# Patient Record
Sex: MALE | Race: BLACK OR AFRICAN AMERICAN | Employment: OTHER | URBAN - METROPOLITAN AREA
[De-identification: names, ages, dates, MRNs, and addresses within clinical notes are randomized per-mention and may not be internally consistent; named-entity substitution may affect disease eponyms.]

---

## 2017-08-11 ENCOUNTER — HOSPITAL ENCOUNTER (EMERGENCY)
Age: 52
Discharge: HOME | End: 2017-08-11
Attending: EMERGENCY MEDICINE
Payer: COMMERCIAL

## 2017-08-11 ENCOUNTER — APPOINTMENT (OUTPATIENT)
Dept: ULTRASOUND IMAGING | Age: 52
End: 2017-08-11
Attending: PHYSICIAN ASSISTANT
Payer: COMMERCIAL

## 2017-08-11 VITALS
BODY MASS INDEX: 41.14 KG/M2 | SYSTOLIC BLOOD PRESSURE: 152 MMHG | TEMPERATURE: 97.5 F | WEIGHT: 262.13 LBS | OXYGEN SATURATION: 91 % | DIASTOLIC BLOOD PRESSURE: 84 MMHG | HEART RATE: 78 BPM | HEIGHT: 67 IN | RESPIRATION RATE: 16 BRPM

## 2017-08-11 DIAGNOSIS — R10.9 RIGHT FLANK PAIN: Primary | ICD-10-CM

## 2017-08-11 DIAGNOSIS — R31.9 HEMATURIA: ICD-10-CM

## 2017-08-11 LAB
ALBUMIN SERPL BCP-MCNC: 3.4 G/DL (ref 3.5–5)
ALBUMIN/GLOB SERPL: 0.8 {RATIO} (ref 1.1–2.2)
ALP SERPL-CCNC: 84 U/L (ref 45–117)
ALT SERPL-CCNC: 36 U/L (ref 12–78)
ANION GAP BLD CALC-SCNC: 6 MMOL/L (ref 5–15)
APPEARANCE UR: CLEAR
AST SERPL W P-5'-P-CCNC: 16 U/L (ref 15–37)
BACTERIA URNS QL MICRO: NEGATIVE /HPF
BASOPHILS # BLD AUTO: 0 K/UL (ref 0–0.1)
BASOPHILS # BLD: 1 % (ref 0–1)
BILIRUB SERPL-MCNC: 0.4 MG/DL (ref 0.2–1)
BILIRUB UR QL: NEGATIVE
BUN SERPL-MCNC: 10 MG/DL (ref 6–20)
BUN/CREAT SERPL: 9 (ref 12–20)
CALCIUM SERPL-MCNC: 8.5 MG/DL (ref 8.5–10.1)
CHLORIDE SERPL-SCNC: 100 MMOL/L (ref 97–108)
CO2 SERPL-SCNC: 29 MMOL/L (ref 21–32)
COLOR UR: ABNORMAL
CREAT SERPL-MCNC: 1.14 MG/DL (ref 0.7–1.3)
EOSINOPHIL # BLD: 0.1 K/UL (ref 0–0.4)
EOSINOPHIL NFR BLD: 1 % (ref 0–7)
EPITH CASTS URNS QL MICRO: ABNORMAL /LPF
ERYTHROCYTE [DISTWIDTH] IN BLOOD BY AUTOMATED COUNT: 12.6 % (ref 11.5–14.5)
GLOBULIN SER CALC-MCNC: 4.2 G/DL (ref 2–4)
GLUCOSE BLD STRIP.AUTO-MCNC: 284 MG/DL (ref 65–100)
GLUCOSE BLD STRIP.AUTO-MCNC: 378 MG/DL (ref 65–100)
GLUCOSE SERPL-MCNC: 309 MG/DL (ref 65–100)
GLUCOSE UR STRIP.AUTO-MCNC: >1000 MG/DL
HCT VFR BLD AUTO: 40.8 % (ref 36.6–50.3)
HGB BLD-MCNC: 14.3 G/DL (ref 12.1–17)
HGB UR QL STRIP: ABNORMAL
KETONES UR QL STRIP.AUTO: NEGATIVE MG/DL
LEUKOCYTE ESTERASE UR QL STRIP.AUTO: NEGATIVE
LIPASE SERPL-CCNC: 94 U/L (ref 73–393)
LYMPHOCYTES # BLD AUTO: 23 % (ref 12–49)
LYMPHOCYTES # BLD: 2.1 K/UL (ref 0.8–3.5)
MCH RBC QN AUTO: 31.2 PG (ref 26–34)
MCHC RBC AUTO-ENTMCNC: 35 G/DL (ref 30–36.5)
MCV RBC AUTO: 89.1 FL (ref 80–99)
MONOCYTES # BLD: 0.3 K/UL (ref 0–1)
MONOCYTES NFR BLD AUTO: 3 % (ref 5–13)
NEUTS SEG # BLD: 6.3 K/UL (ref 1.8–8)
NEUTS SEG NFR BLD AUTO: 72 % (ref 32–75)
NITRITE UR QL STRIP.AUTO: NEGATIVE
PH UR STRIP: 5.5 [PH] (ref 5–8)
PLATELET # BLD AUTO: 159 K/UL (ref 150–400)
POTASSIUM SERPL-SCNC: 3.8 MMOL/L (ref 3.5–5.1)
PROT SERPL-MCNC: 7.6 G/DL (ref 6.4–8.2)
PROT UR STRIP-MCNC: NEGATIVE MG/DL
RBC # BLD AUTO: 4.58 M/UL (ref 4.1–5.7)
RBC #/AREA URNS HPF: ABNORMAL /HPF (ref 0–5)
SERVICE CMNT-IMP: ABNORMAL
SERVICE CMNT-IMP: ABNORMAL
SODIUM SERPL-SCNC: 135 MMOL/L (ref 136–145)
SP GR UR REFRACTOMETRY: 1.02 (ref 1–1.03)
UA: UC IF INDICATED,UAUC: ABNORMAL
UROBILINOGEN UR QL STRIP.AUTO: 0.2 EU/DL (ref 0.2–1)
WBC # BLD AUTO: 8.8 K/UL (ref 4.1–11.1)
WBC URNS QL MICRO: ABNORMAL /HPF (ref 0–4)

## 2017-08-11 PROCEDURE — 36415 COLL VENOUS BLD VENIPUNCTURE: CPT

## 2017-08-11 PROCEDURE — 81001 URINALYSIS AUTO W/SCOPE: CPT

## 2017-08-11 PROCEDURE — 76705 ECHO EXAM OF ABDOMEN: CPT

## 2017-08-11 PROCEDURE — 74011250636 HC RX REV CODE- 250/636: Performed by: PHYSICIAN ASSISTANT

## 2017-08-11 PROCEDURE — 82962 GLUCOSE BLOOD TEST: CPT

## 2017-08-11 PROCEDURE — 99285 EMERGENCY DEPT VISIT HI MDM: CPT

## 2017-08-11 PROCEDURE — 85025 COMPLETE CBC W/AUTO DIFF WBC: CPT

## 2017-08-11 PROCEDURE — 96361 HYDRATE IV INFUSION ADD-ON: CPT

## 2017-08-11 PROCEDURE — 96374 THER/PROPH/DIAG INJ IV PUSH: CPT

## 2017-08-11 PROCEDURE — 96375 TX/PRO/DX INJ NEW DRUG ADDON: CPT

## 2017-08-11 PROCEDURE — 80053 COMPREHEN METABOLIC PANEL: CPT | Performed by: EMERGENCY MEDICINE

## 2017-08-11 PROCEDURE — 83690 ASSAY OF LIPASE: CPT

## 2017-08-11 RX ORDER — METOCLOPRAMIDE HYDROCHLORIDE 5 MG/ML
10 INJECTION INTRAMUSCULAR; INTRAVENOUS
Status: COMPLETED | OUTPATIENT
Start: 2017-08-11 | End: 2017-08-11

## 2017-08-11 RX ORDER — IBUPROFEN 800 MG/1
800 TABLET ORAL
Qty: 30 TAB | Refills: 0 | Status: SHIPPED | OUTPATIENT
Start: 2017-08-11 | End: 2017-09-26 | Stop reason: ALTCHOICE

## 2017-08-11 RX ORDER — OXYCODONE AND ACETAMINOPHEN 5; 325 MG/1; MG/1
1 TABLET ORAL
Qty: 10 TAB | Refills: 0 | Status: SHIPPED | OUTPATIENT
Start: 2017-08-11 | End: 2017-08-14

## 2017-08-11 RX ORDER — ONDANSETRON 4 MG/1
4 TABLET, ORALLY DISINTEGRATING ORAL
Qty: 10 TAB | Refills: 0 | Status: SHIPPED | OUTPATIENT
Start: 2017-08-11 | End: 2017-09-26 | Stop reason: ALTCHOICE

## 2017-08-11 RX ORDER — TAMSULOSIN HYDROCHLORIDE 0.4 MG/1
0.4 CAPSULE ORAL DAILY
Qty: 7 CAP | Refills: 0 | Status: SHIPPED | OUTPATIENT
Start: 2017-08-11 | End: 2017-08-18

## 2017-08-11 RX ORDER — MORPHINE SULFATE 2 MG/ML
4 INJECTION, SOLUTION INTRAMUSCULAR; INTRAVENOUS ONCE
Status: COMPLETED | OUTPATIENT
Start: 2017-08-11 | End: 2017-08-11

## 2017-08-11 RX ORDER — ONDANSETRON 2 MG/ML
4 INJECTION INTRAMUSCULAR; INTRAVENOUS ONCE
Status: COMPLETED | OUTPATIENT
Start: 2017-08-11 | End: 2017-08-11

## 2017-08-11 RX ADMIN — ONDANSETRON 4 MG: 2 INJECTION INTRAMUSCULAR; INTRAVENOUS at 12:10

## 2017-08-11 RX ADMIN — METOCLOPRAMIDE 10 MG: 5 INJECTION, SOLUTION INTRAMUSCULAR; INTRAVENOUS at 12:10

## 2017-08-11 RX ADMIN — SODIUM CHLORIDE 1000 ML: 900 INJECTION, SOLUTION INTRAVENOUS at 12:11

## 2017-08-11 RX ADMIN — MORPHINE SULFATE 4 MG: 2 INJECTION, SOLUTION INTRAMUSCULAR; INTRAVENOUS at 12:13

## 2017-08-11 NOTE — ED NOTES
Called to have lab add-on the lipase. Original labs hemolyzed, so second sample sent the lipase was not processed. Will process the lipase now.

## 2017-08-11 NOTE — DISCHARGE INSTRUCTIONS
Thank you for allowing us to provide you with excellent care today. We hope we addressed all of your concerns and needs. We strive to provide excellent quality care in the Emergency Department. Please rate us as excellent, as anything less than excellent does not meet our expectations. If you feel that you have not received excellent quality care or timely care, please ask to speak to the nurse manager. Please choose us in the future for your continued health care needs. The exam and treatment you received in the Emergency Department were for an urgent problem and are not intended as complete care. It is important that you follow-up with a doctor, nurse practitioner, or physician assistant to:  (1) confirm your diagnosis,  (2) re-evaluation of changes in your illness and treatment, and  (3) for ongoing care. If your symptoms become worse or you do not improve as expected and you are unable to reach your usual health care provider, you should return to the Emergency Department. We are available 24 hours a day. Take this sheet with you when you go to your follow-up visit. If you have any problem arranging the follow-up visit, contact [address] [place] [address] 21 [phone])    Make an appointment with your Primary Care doctor for follow up of this visit. Return to the ER if you are unable to be seen in the time recommended on your discharge instructions.

## 2017-08-11 NOTE — ED NOTES
Pt presents to ED for for R. Flank pain that radiates to abdomen x 0900 am. Pt reports nausea and dry heaving with the pain. Pt denies diarrhea, or urinary symptoms. Normal bowel movement this morning. Pt alert and oriented x 4. Pt has diabetes and has not seen a MD for that recently due to a lapse in insurance.

## 2017-08-11 NOTE — ED PROVIDER NOTES
HPI Comments: Delfino Ray is a 47 y.o. male with a PMH of DM presenting ambulatory to the ED from home C/O sudden onset 7/10 cramping R flank pain x 9 AM this morning. Pt notes associated symptom of nausea at the time but states it has resolved. He notes the pain was worse in his back but reported it had migrated to the front. He states the pain is similar to a pulled muscle and reports movement does not exacerbate his pain. He noted his last BM was this morning. The pt states he does not check his sugar levels regularly and reports not taking his DM pill this morning. He reports he smokes 1ppd. He denies any recent changes to his medication. Pt denies any abdominal surgeries. Pt denies a h/o kidney stones or gallstones. Pt denies a h/o a cholecystectomy or appendectomy. Pt denies fever, vomiting, hematuria, urinary discomfort, frequency, diarrhea, blood in his stool, CP, SOB, sore throat, rash, difficulty swallowing, cough, or dry mouth. Patient denies any other symptoms or complaints. PCP: Srikanth Anderson MD    There are no other complaints, changes or physical findings at this time. The history is provided by the patient. No  was used. Past Medical History:   Diagnosis Date    Abscess     Diabetes ([address]     Hypercholesterolemia     Sleep apnea        History reviewed. No pertinent surgical history. Family History:   Problem Relation Age of Onset    Diabetes Mother     Alcohol abuse Father     No Known Problems Sister     Diabetes Brother     Stroke Brother     No Known Problems Brother     No Known Problems Brother     No Known Problems Sister        Social History     Social History    Marital status: SINGLE     Spouse name: N/A    Number of children: N/A    Years of education: N/A     Occupational History    Not on file.      Social History Main Topics    Smoking status: Current Every Day Smoker     Packs/day: 1.00    Smokeless tobacco: Never Used   [facility] Alcohol use Yes      Comment: holidays, several timses a yr    Drug use: No    Sexual activity: Yes     Partners: Female     Other Topics Concern    Not on file     Social History Narrative         ALLERGIES: Review of patient's allergies indicates no known allergies. Review of Systems   Constitutional: Negative for chills, diaphoresis and fever. HENT: Negative for rhinorrhea, sore throat and trouble swallowing.         -dry mouth   Eyes: Negative for pain. Respiratory: Negative for cough, shortness of breath, wheezing and stridor. Cardiovascular: Negative for chest pain and leg swelling. Gastrointestinal: Negative for abdominal pain, blood in stool, diarrhea, nausea (resolved) and vomiting. Genitourinary: Positive for flank pain (+R). Negative for difficulty urinating, dysuria, frequency and hematuria. Musculoskeletal: Negative for back pain and neck stiffness. Skin: Negative for rash. Neurological: Negative for dizziness, seizures, syncope, weakness, light-headedness and headaches. Psychiatric/Behavioral: Negative for behavioral problems and confusion. Vitals:    08/11/17 1200 08/11/17 1214 08/11/17 1216 08/11/17 1402   BP: 160/85 164/83 164/83    Pulse: 76 79 75 73   Resp: 16  16 16   Temp:       SpO2: 95% 96% 97% 98%   Weight:       Height:                Physical Exam   Constitutional: He is oriented to person, place, and time. He appears well-developed. No distress. Morbidly obese male. HENT:   Head: Normocephalic and atraumatic. Right Ear: External ear normal.   Left Ear: External ear normal.   Nose: Nose normal.   Eyes: Conjunctivae and EOM are normal. Right eye exhibits no discharge. Left eye exhibits no discharge. No scleral icterus. Neck: Normal range of motion. Neck supple. Cardiovascular: Normal rate, regular rhythm, normal heart sounds and intact distal pulses. No murmur heard. Pulmonary/Chest: Effort normal and breath sounds normal. No stridor.  No respiratory distress. He has no decreased breath sounds. He has no wheezes. Abdominal: Soft. Bowel sounds are normal. He exhibits no distension. There is tenderness. There is no rebound. RUQ TTP   Musculoskeletal: Normal range of motion. He exhibits no edema or tenderness. No CVA tenderness BL. Neurological: He is alert and oriented to person, place, and time. Skin: Skin is warm and dry. No rash noted. He is not diaphoretic. Psychiatric: He has a normal mood and affect. Judgment normal.   Nursing note and vitals reviewed. MDM  Number of Diagnoses or Management Options  Hematuria:   Right flank pain:   Diagnosis management comments: DDx: cholecystitis, cholelithiasis, ascending cholangitis, choledocholithiasis, sphincter of Oddi dysfunction, hepatitis, gastroenteritis    Patient presents with RUQ abdominal pain x 1 day. Will assess with labs, imaging and control pain/nausea. Labs do not reveal acute abnormality. Urine shows large amount of blood. RUQ ultrasound does not reveal pathology. Possibility patient has small kidney stone vs already passed one. Advised patient to f/u closely with his PCP if pain persists. Amount and/or Complexity of Data Reviewed  Clinical lab tests: ordered and reviewed  Tests in the radiology section of CPT®: ordered and reviewed  Review and summarize past medical records: yes  Independent visualization of images, tracings, or specimens: yes    Patient Progress  Patient progress: stable    ED Course       Procedures    Chief Complaint   Patient presents with    Flank Pain     right flank pain since this morning. no fever.   no n/v/d or difficulty urinating       MEDICATIONS GIVEN:  Medications   sodium chloride 0.9 % bolus infusion 1,000 mL (1,000 mL IntraVENous New Bag 8/11/17 1211)   ondansetron (ZOFRAN) injection 4 mg (4 mg IntraVENous Given 8/11/17 1210)   metoclopramide HCl (REGLAN) injection 10 mg (10 mg IntraVENous Given 8/11/17 1210)   morphine injection 4 mg (4 mg IntraVENous Given 8/11/17 1213)       LABS REVIEWED:  Recent Results (from the past 24 hour(s))   GLUCOSE, POC    Collection Time: 08/11/17 11:45 AM   Result Value Ref Range    Glucose (POC) 378 (H) 65 - 100 mg/dL    Performed by Carrie Argueta    CBC WITH AUTOMATED DIFF    Collection Time: 08/11/17 12:00 PM   Result Value Ref Range    WBC 8.8 4.1 - 11.1 K/uL    RBC 4.58 4.10 - 5.70 M/uL    HGB 14.3 12.1 - 17.0 g/dL    HCT 40.8 36.6 - 50.3 %    MCV 89.1 80.0 - 99.0 FL    MCH 31.2 26.0 - 34.0 PG    MCHC 35.0 30.0 - 36.5 g/dL    RDW 12.6 11.5 - 14.5 %    PLATELET 317 400 - 066 K/uL    NEUTROPHILS 72 32 - 75 %    LYMPHOCYTES 23 12 - 49 %    MONOCYTES 3 (L) 5 - 13 %    EOSINOPHILS 1 0 - 7 %    BASOPHILS 1 0 - 1 %    ABS. NEUTROPHILS 6.3 1.8 - 8.0 K/UL    ABS. LYMPHOCYTES 2.1 0.8 - 3.5 K/UL    ABS. MONOCYTES 0.3 0.0 - 1.0 K/UL    ABS. EOSINOPHILS 0.1 0.0 - 0.4 K/UL    ABS.  BASOPHILS 0.0 0.0 - 0.1 K/UL   URINALYSIS W/ REFLEX CULTURE    Collection Time: 08/11/17 12:15 PM   Result Value Ref Range    Color YELLOW/STRAW      Appearance CLEAR CLEAR      Specific gravity 1.020 1.003 - 1.030      pH (UA) 5.5 5.0 - 8.0      Protein NEGATIVE  NEG mg/dL    Glucose >1000 (A) NEG mg/dL    Ketone NEGATIVE  NEG mg/dL    Bilirubin NEGATIVE  NEG      Blood LARGE (A) NEG      Urobilinogen 0.2 0.2 - 1.0 EU/dL    Nitrites NEGATIVE  NEG      Leukocyte Esterase NEGATIVE  NEG      WBC 0-4 0 - 4 /hpf    RBC 20-50 0 - 5 /hpf    Epithelial cells FEW FEW /lpf    Bacteria NEGATIVE  NEG /hpf    UA:UC IF INDICATED CULTURE NOT INDICATED BY UA RESULT CNI     METABOLIC PANEL, COMPREHENSIVE    Collection Time: 08/11/17 12:25 PM   Result Value Ref Range    Sodium 135 (L) 136 - 145 mmol/L    Potassium 3.8 3.5 - 5.1 mmol/L    Chloride 100 97 - 108 mmol/L    CO2 29 21 - 32 mmol/L    Anion gap 6 5 - 15 mmol/L    Glucose 309 (H) 65 - 100 mg/dL    BUN 10 6 - 20 MG/DL    Creatinine 1.14 0.70 - 1.30 MG/DL    BUN/Creatinine ratio 9 (L) 12 - 20      GFR est AA >60 >60 ml/min/1.73m2    GFR est non-AA >60 >60 ml/min/1.73m2    Calcium 8.5 8.5 - 10.1 MG/DL    Bilirubin, total 0.4 0.2 - 1.0 MG/DL    ALT (SGPT) 36 12 - 78 U/L    AST (SGOT) 16 15 - 37 U/L    Alk. phosphatase 84 45 - 117 U/L    Protein, total 7.6 6.4 - 8.2 g/dL    Albumin 3.4 (L) 3.5 - 5.0 g/dL    Globulin 4.2 (H) 2.0 - 4.0 g/dL    A-G Ratio 0.8 (L) 1.1 - 2.2     LIPASE    Collection Time: 08/11/17 12:25 PM   Result Value Ref Range    Lipase 94 73 - 393 U/L   GLUCOSE, POC    Collection Time: 08/11/17  1:58 PM   Result Value Ref Range    Glucose (POC) 284 (H) 65 - 100 mg/dL    Performed by Kimberly Schultz        VITAL SIGNS:  Patient Vitals for the past 12 hrs:   Temp Pulse Resp BP SpO2   08/11/17 1402 - 73 16 - 98 %   08/11/17 1216 - 75 16 164/83 97 %   08/11/17 1214 - 79 - 164/83 96 %   08/11/17 1200 - 76 16 160/85 95 %   08/11/17 1125 97.5 °F (36.4 °C) 70 16 (!) 163/95 95 %       RADIOLOGY RESULTS:  The following have been ordered and reviewed:  US ABD LTD   Final Result   EXAM:  ABDOMEN, LTD - US*   INDICATION: Right upper quadrant abdominal pain and nausea for one day. COMPARISON: None.     TECHNIQUE:   Limited real-time sonography of the right upper quadrant of the abdomen was  performed with multiple static images of the liver, gallbladder, pancreatic head  and right kidney obtained.     FINDINGS:  GALLBLADDER: The gallbladder is normal. There is no wall thickening or fluid  around the gallbladder. COMMON BILE DUCT: There is no biliary duct dilatation and the common duct  measures 4.2 mm in diameter. LIVER: The liver is echogenic with no mass or other focal abnormality. LIVER VASCULATURE: The portal vein flow is towards the liver just.  PANCREAS: The pancreatic head is normal.  RIGHT KIDNEY: The right kidney demonstrates normal echogenicity with no mass,  stone or hydronephrosis. The right kidney measures . 5 cm in length.     The body and tail of the pancreas, left kidney, spleen and retroperitoneum were  not evaluated on this right upper quadrant examination.     IMPRESSION  IMPRESSION: Echogenic liver consistent with hepatic steatosis, otherwise normal  ultrasound examination of the right upper quadrant. PROGRESS NOTES:  11:42 AM  Initial assessment of patient complete, and patient was given the opportunity to ask questions. Plan of care reviewed with patient and will update when results are available. 2:27 PM  I have just reevaluated the patient. I have reviewed his vital signs and determined there is currently no worsening in their condition or physical exam. Results have been reviewed with them and their questions have been answered. 2:27 PM  I have reviewed discharge instructions with the patient and explained medications that he is being discharged with. The patient verbalized understanding and agrees with plan. DIAGNOSIS:    1. Right flank pain    2. Hematuria        PLAN:  1. Discharge home  2. Medications as directed  3. F/u with PCP in 2-3 days  4. Return precautions reviewed      Follow-up Information     Follow up With Details Comments Dimitris Kasper MD Schedule an appointment as soon as possible for a visit in 3 days  [address] [facility] [address] [address] [facility] Urology Schedule an appointment as soon as possible for a visit  [address] [place] [address] [place] [phone]      [facility] EMERGENCY DEPT  As needed, If symptoms worsen [address] [facility] [address]  [phone]        Current Discharge Medication List      START taking these medications    Details   tamsulosin (FLOMAX) 0.4 mg capsule Take 1 Cap by mouth daily for 7 doses. Qty: 7 Cap, Refills: 0      ondansetron (ZOFRAN ODT) 4 mg disintegrating tablet Take 1 Tab by mouth every eight (8) hours as needed for Nausea. Qty: 10 Tab, Refills: 0      ibuprofen (MOTRIN) 800 mg tablet Take 1 Tab by mouth every eight (8) hours as needed for Pain.   Qty: 30 Tab, Refills: 0      oxyCODONE-acetaminophen (PERCOCET) 5-325 mg per tablet Take 1 Tab by mouth every six (6) hours as needed for Pain for up to 3 days. Max Daily Amount: 4 Tabs. Qty: 10 Tab, Refills: 0               ED COURSE: The patients hospital course has been uncomplicated. DISCHARGE NOTE:  2:28 PM  The care plan has been outline with the patient and/or family, who verbally conveyed understanding and agreement. Available results have been reviewed. Patient and/or family understand the follow up plan as outlined and discharge instructions. Should their condition deterioration at any time after discharge the patient agrees to return, follow up sooner than outlined or seek medical assistance at the closest Emergency Room as soon as possible. Questions have been answered. Discharge instructions and educational information regarding the patient's diagnosis as well a list of reasons why the patient would want to seek immediate medical attention, should their condition change, were reviewed directly with the patient/family     This note is prepared by Nelson Vanegas, acting as Scribe for Imelda Cool PA-C. Imelda Cool PA-C: The scribe's documentation has been prepared under my direction and personally reviewed by me in its entirety. I confirm that the note above accurately reflects all work, treatment, procedures, and medical decision making performed by me.         This note will not be viewable in [employer]

## 2017-08-11 NOTE — ED NOTES
MD Gin Mejia has reviewed discharge instructions with the patient. The patient verbalized understanding. Pt discharged with written instructions. No further concerns at this time. IV removed.

## 2017-09-26 ENCOUNTER — OFFICE VISIT (OUTPATIENT)
Dept: INTERNAL MEDICINE CLINIC | Age: 52
End: 2017-09-26

## 2017-09-26 VITALS
HEIGHT: 67 IN | OXYGEN SATURATION: 99 % | SYSTOLIC BLOOD PRESSURE: 122 MMHG | WEIGHT: 248.6 LBS | HEART RATE: 79 BPM | BODY MASS INDEX: 39.02 KG/M2 | TEMPERATURE: 96.6 F | DIASTOLIC BLOOD PRESSURE: 78 MMHG | RESPIRATION RATE: 17 BRPM

## 2017-09-26 DIAGNOSIS — Z91.14 NON COMPLIANCE W MEDICATION REGIMEN: ICD-10-CM

## 2017-09-26 DIAGNOSIS — E11.9 TYPE 2 DIABETES MELLITUS WITHOUT COMPLICATION, WITHOUT LONG-TERM CURRENT USE OF INSULIN (HCC): Primary | ICD-10-CM

## 2017-09-26 LAB — HBA1C MFR BLD HPLC: 12.8 %

## 2017-09-26 RX ORDER — LANCETS 28 GAUGE
EACH MISCELLANEOUS
Qty: 100 LANCET | Refills: 11 | Status: SHIPPED | OUTPATIENT
Start: 2017-09-26 | End: 2017-10-30 | Stop reason: CLARIF

## 2017-09-26 RX ORDER — METFORMIN HYDROCHLORIDE 1000 MG/1
1000 TABLET ORAL 2 TIMES DAILY WITH MEALS
Qty: 60 TAB | Refills: 2 | Status: SHIPPED | OUTPATIENT
Start: 2017-09-26 | End: 2018-01-18 | Stop reason: SDUPTHER

## 2017-09-26 RX ORDER — GLIPIZIDE 10 MG/1
10 TABLET ORAL 2 TIMES DAILY
Qty: 60 TAB | Refills: 2 | Status: SHIPPED | OUTPATIENT
Start: 2017-09-26 | End: 2018-01-18 | Stop reason: SDUPTHER

## 2017-09-26 RX ORDER — INSULIN PUMP SYRINGE, 3 ML
EACH MISCELLANEOUS
Qty: 1 KIT | Refills: 0 | Status: SHIPPED | OUTPATIENT
Start: 2017-09-26 | End: 2017-10-30 | Stop reason: CLARIF

## 2017-09-26 NOTE — MR AVS SNAPSHOT
Visit Information Date & Time Provider Department Dept. Phone Encounter #  
 9/26/2017  7:30 AM Chadwick Henderson MD [facility] Internal Medicine [phone] [id number] Follow-up Instructions Return in about 1 month (around 10/26/2017) for Bring diabetic log book. Alexander Mayers Upcoming Health Maintenance Date Due  
 FOOT EXAM Q1 4/9/1976 EYE EXAM RETINAL OR DILATED Q1 4/9/1976 Pneumococcal 19-64 Medium Risk (1 of 1 - PPSV23) 4/9/1985 DTaP/Tdap/Td series (1 - Tdap) 4/9/1987 GLAUCOMA SCREENING Q2Y 4/9/2016 FOBT Q 1 YEAR AGE 50-75 4/9/2016 MICROALBUMIN Q1 2/26/2017 LIPID PANEL Q1 2/26/2017 HEMOGLOBIN A1C Q6M 3/29/2017 Allergies as of 9/26/2017  Review Complete On: 8/11/2017 By: Tao Flaherty RN No Known Allergies Current Immunizations  Never Reviewed No immunizations on file. Not reviewed this visit You Were Diagnosed With   
  
 Codes Comments Type 2 diabetes mellitus without complication, without long-term current use of insulin (HCC)    -  Primary ICD-10-CM: E11.9 ICD-9-CM: 250.00 Vitals BP Pulse Temp Resp Height(growth percentile) Weight(growth percentile) 122/78 (BP 1 Location: Left arm, BP Patient Position: Sitting) 79 96.6 °F (35.9 °C) (Oral) 17 5' 7\" (1.702 m) 248 lb 9.6 oz (112.8 kg) SpO2 BMI Smoking Status 99% 38.94 kg/m2 Current Every Day Smoker Vitals History BMI and BSA Data Body Mass Index Body Surface Area 38.94 kg/m 2 2.31 m 2 Preferred Pharmacy Pharmacy Name Phone [address] [phone] Your Updated Medication List  
  
   
This list is accurate as of: 9/26/17  8:41 AM.  Always use your most recent med list.  
  
  
  
  
 Blood-Glucose Meter monitoring kit Commonly known as:  FREESTYLE LITE METER Check blood sugar in AM and 2 hours after dinner. glipiZIDE 10 mg tablet Commonly known as:  Lino Picking Take 1 Tab by mouth two (2) times a day. glucose blood VI test strips strip Commonly known as:  FREESTYLE LITE STRIPS Check blood sugar in AM and 2 hours after dinner. JANUVIA PO Take  by mouth.  
  
 lancets 28 gauge Misc Commonly known as:  FREESTYLE LANCETS Check blood sugar in AM and 2 hours after dinner. metFORMIN 1,000 mg tablet Commonly known as:  GLUCOPHAGE Take 1 Tab by mouth two (2) times daily (with meals). Prescriptions Sent to Pharmacy Refills  
 metFORMIN (GLUCOPHAGE) 1,000 mg tablet 2 Sig: Take 1 Tab by mouth two (2) times daily (with meals). Class: Normal  
 Pharmacy: [facility] [address] [facility] Ph #: [phone] Route: Oral  
 glipiZIDE (GLUCOTROL) 10 mg tablet 2 Sig: Take 1 Tab by mouth two (2) times a day. Class: Normal  
 Pharmacy: [facility] [address], [place] [facility] Ph #: [phone] Route: Oral  
 glucose blood VI test strips (FREESTYLE LITE STRIPS) strip 11 Sig: Check blood sugar in AM and 2 hours after dinner. Class: Normal  
 Pharmacy: [facility] [address] [facility] Ph #: [phone] Blood-Glucose Meter (FREESTYLE LITE METER) monitoring kit 0 Sig: Check blood sugar in AM and 2 hours after dinner. Class: Normal  
 Pharmacy: [facility] [address], [place] [facility] Ph #: [phone]  
 lancets (FREESTYLE LANCETS) 28 gauge misc 11 Sig: Check blood sugar in AM and 2 hours after dinner. Class: Normal  
 Pharmacy: [facility] [address], [place] [facility] Ph #: [phone] We Performed the Following AMB POC HEMOGLOBIN A1C [40654 CPT(R)] LIPID PANEL [50368 CPT(R)] METABOLIC PANEL, COMPREHENSIVE [03082 CPT(R)] MICROALBUMIN, UR, RAND W/ MICROALBUMIN/CREA RATIO [id number] CPT(R)] Follow-up Instructions Return in about 1 month (around 10/26/2017) for Bring diabetic log book. Morro Erwin Introducing [facility]! [facility] introduces [employer] patient portal. Now you can access parts of your medical record, email your doctor's office, and request medication refills online. 1. In your internet browser, go to [email or web address] 2. Click on the First Time User? Click Here link in the Sign In box. You will see the New Member Sign Up page. 3. Enter your [employer] Access Code exactly as it appears below. You will not need to use this code after youve completed the sign-up process. If you do not sign up before the expiration date, you must request a new code. · [employer] Access Code: [email or web address] Expires: 11/9/2017 11:37 AM 
 
4. Enter the last four digits of your Social Security Number (xxxx) and Date of Birth (mm/dd/yyyy) as indicated and click Submit. You will be taken to the next sign-up page. 5. Create a [employer] ID. This will be your [employer] login ID and cannot be changed, so think of one that is secure and easy to remember. 6. Create a [employer] password. You can change your password at any time. 7. Enter your Password Reset Question and Answer. This can be used at a later time if you forget your password. 8. Enter your e-mail address. You will receive e-mail notification when new information is available in [address] 9. Click Sign Up. You can now view and download portions of your medical record. 10. Click the Download Summary menu link to download a portable copy of your medical information. If you have questions, please visit the Frequently Asked Questions section of the [employer] website.  Remember, [employer] is NOT to be used for urgent needs. For medical emergencies, dial 911. Now available from your iPhone and Android! Please provide this summary of care documentation to your next provider. Your primary care clinician is listed as Chadwick Freitas. If you have any questions after today's visit, please call [phone].

## 2017-09-26 NOTE — PROGRESS NOTES
Subjective:     Chief Complaint   Patient presents with    Diabetes        He  is a 47y.o. year old male who presents today  after one year for diabetes follow up. Last A1c was 11.9. He reports that he has not been taking any of his med for the past one year. He also reports to me that [facility], was given Januvia ( dose unknown). Did not give me any specific answer today whether he has been taking the med or not. Reports that his eyes are blurry sometimes otherwise denies any chest pain, UTI symptoms, CP. Pertinent items are noted in HPI. Objective:     Vitals:    09/26/17 0813   BP: 122/78   Pulse: 79   Resp: 17   Temp: 96.6 °F (35.9 °C)   TempSrc: Oral   SpO2: 99%   Weight: 248 lb 9.6 oz (112.8 kg)   Height: 5' 7\" (1.702 m)       Physical Examination: General appearance - alert, well appearing, and in no distress, oriented to person, place, and time and overweight  Mental status - alert, oriented to person, place, and time, normal mood, behavior, speech, dress, motor activity, and thought processes  Chest - clear to auscultation, no wheezes, rales or rhonchi, symmetric air entry  Heart - normal rate, regular rhythm, normal S1, S2, no murmurs, rubs, clicks or gallops  Neurological - alert, oriented, normal speech, no focal findings or movement disorder noted.     No Known Allergies   Social History     Social History    Marital status: SINGLE     Spouse name: N/A    Number of children: N/A    Years of education: N/A     Social History Main Topics    Smoking status: Current Every Day Smoker     Packs/day: 1.00    Smokeless tobacco: Never Used    Alcohol use Yes      Comment: holidays, several timses a yr    Drug use: No    Sexual activity: Yes     Partners: Female     Other Topics Concern    None     Social History Narrative      Family History   Problem Relation Age of Onset    Diabetes Mother     Alcohol abuse Father     No Known Problems Sister     Diabetes Brother     Stroke Brother     No Known Problems Brother     No Known Problems Brother     No Known Problems Sister       History reviewed. No pertinent surgical history. Past Medical History:   Diagnosis Date    Abscess     Diabetes ([address]     Hypercholesterolemia     Sleep apnea       Current Outpatient Prescriptions   Medication Sig Dispense Refill    metFORMIN (GLUCOPHAGE) 1,000 mg tablet Take 1 Tab by mouth two (2) times daily (with meals). 60 Tab 2    glipiZIDE (GLUCOTROL) 10 mg tablet Take 1 Tab by mouth two (2) times a day. 60 Tab 2    glucose blood VI test strips (FREESTYLE LITE STRIPS) strip Check blood sugar in AM and 2 hours after dinner. 100 Strip 11    Blood-Glucose Meter (FREESTYLE LITE METER) monitoring kit Check blood sugar in AM and 2 hours after dinner. 1 Kit 0    lancets (FREESTYLE LANCETS) 28 gauge misc Check blood sugar in AM and 2 hours after dinner. 100 Lancet 11    SITAGLIPTIN PHOSPHATE (JANUVIA PO) Take  by mouth. Assessment/ Plan:   Diagnoses and all orders for this visit:    1. Type 2 diabetes mellitus without complication, without long-term current use of insulin ([facility])  -     AMB POC HEMOGLOBIN A1C 12.8. Not taking his med for the past one year. Educated patient on insulin therapy. -   start  metFORMIN (GLUCOPHAGE) 1,000 mg tablet; Take 1 Tab by mouth two (2) times daily (with meals). -     Start glipiZIDE (GLUCOTROL) 10 mg tablet; Take 1 Tab by mouth two (2) times a day. -     glucose blood VI test strips (FREESTYLE LITE STRIPS) strip; Check blood sugar in AM and 2 hours after dinner.  -     Blood-Glucose Meter (FREESTYLE LITE METER) monitoring kit; Check blood sugar in AM and 2 hours after dinner.  -     lancets (FREESTYLE LANCETS) 28 gauge misc;  Check blood sugar in AM and 2 hours after dinner.  -     METABOLIC PANEL, COMPREHENSIVE  -     MICROALBUMIN, UR, RAND W/ MICROALBUMIN/CREA RATIO  -     LIPID PANEL  -     Strongly advised to have a follow up in one month with BS log book. 2. Non compliance w medication regimen        -    counseled on med compliance. recommended to have a yearly eye exam.     Declined flu, pneumonia vaccine. Medication risks/benefits/costs/interactions/alternatives discussed with patient. Advised patient to call back or return to office if symptoms worsen/change/persist. If patient cannot reach us or should anything more severe/urgent arise he/she should proceed directly to the nearest emergency department. Discussed expected course/resolution/complications of diagnosis in detail with patient. Patient given a written after visit summary which includes her diagnoses, current medications and vitals. Patient expressed understanding with the diagnosis and plan. Follow-up Disposition:  Return in about 1 month (around 10/26/2017) for Bring diabetic log book. Tejal Carlos

## 2017-09-27 ENCOUNTER — TELEPHONE (OUTPATIENT)
Dept: INTERNAL MEDICINE CLINIC | Age: 52
End: 2017-09-27

## 2017-09-27 LAB
ALBUMIN SERPL-MCNC: 4 G/DL (ref 3.5–5.5)
ALBUMIN/CREAT UR: 5.7 MG/G CREAT (ref 0–30)
ALBUMIN/GLOB SERPL: 1.3 {RATIO} (ref 1.2–2.2)
ALP SERPL-CCNC: 103 IU/L (ref 39–117)
ALT SERPL-CCNC: 18 IU/L (ref 0–44)
AST SERPL-CCNC: 15 IU/L (ref 0–40)
BILIRUB SERPL-MCNC: 0.4 MG/DL (ref 0–1.2)
BUN SERPL-MCNC: 7 MG/DL (ref 6–24)
BUN/CREAT SERPL: 8 (ref 9–20)
CALCIUM SERPL-MCNC: 9.5 MG/DL (ref 8.7–10.2)
CHLORIDE SERPL-SCNC: 96 MMOL/L (ref 96–106)
CHOLEST SERPL-MCNC: 180 MG/DL (ref 100–199)
CO2 SERPL-SCNC: 22 MMOL/L (ref 18–29)
CREAT SERPL-MCNC: 0.92 MG/DL (ref 0.76–1.27)
CREAT UR-MCNC: 73.2 MG/DL
GLOBULIN SER CALC-MCNC: 3.2 G/DL (ref 1.5–4.5)
GLUCOSE SERPL-MCNC: 367 MG/DL (ref 65–99)
HDLC SERPL-MCNC: 40 MG/DL
INTERPRETATION, 910389: NORMAL
LDLC SERPL CALC-MCNC: 120 MG/DL (ref 0–99)
Lab: NORMAL
MICROALBUMIN UR-MCNC: 4.2 UG/ML
POTASSIUM SERPL-SCNC: 4.9 MMOL/L (ref 3.5–5.2)
PROT SERPL-MCNC: 7.2 G/DL (ref 6–8.5)
SODIUM SERPL-SCNC: 136 MMOL/L (ref 134–144)
TRIGL SERPL-MCNC: 99 MG/DL (ref 0–149)
VLDLC SERPL CALC-MCNC: 20 MG/DL (ref 5–40)

## 2017-09-27 NOTE — PROGRESS NOTES
Please call to inform him that his kidney and liver function is normal. BS is 367. Make sure to tale med regularly and f/u in one month. Cholesterol level is slightly above normal range. Work on diet and exercise. Will have a follow up fasting lab in three months. Depending on the result we might consider starting med to bring down LDL to <70.

## 2017-09-27 NOTE — TELEPHONE ENCOUNTER
----- Message from Doreen Samano MD sent at 9/27/2017  1:57 PM EDT -----  Please call to inform him that his kidney and liver function is normal. BS is 367. Make sure to tale med regularly and f/u in one month. Cholesterol level is slightly above normal range. Work on diet and exercise. Will have a follow up fasting lab in three months. Depending on the result we might consider starting med to bring down LDL to <70.

## 2017-10-30 ENCOUNTER — TELEPHONE (OUTPATIENT)
Dept: INTERNAL MEDICINE CLINIC | Age: 52
End: 2017-10-30

## 2017-10-30 ENCOUNTER — OFFICE VISIT (OUTPATIENT)
Dept: INTERNAL MEDICINE CLINIC | Age: 52
End: 2017-10-30

## 2017-10-30 VITALS
SYSTOLIC BLOOD PRESSURE: 123 MMHG | BODY MASS INDEX: 39.58 KG/M2 | OXYGEN SATURATION: 96 % | DIASTOLIC BLOOD PRESSURE: 85 MMHG | HEART RATE: 74 BPM | TEMPERATURE: 97.3 F | WEIGHT: 252.2 LBS | RESPIRATION RATE: 18 BRPM | HEIGHT: 67 IN

## 2017-10-30 DIAGNOSIS — E11.9 TYPE 2 DIABETES MELLITUS WITHOUT COMPLICATION, WITHOUT LONG-TERM CURRENT USE OF INSULIN (HCC): Primary | ICD-10-CM

## 2017-10-30 LAB — GLUCOSE POC: 237 MG/DL

## 2017-10-30 RX ORDER — INSULIN PUMP SYRINGE, 3 ML
EACH MISCELLANEOUS
Qty: 1 KIT | Refills: 0 | Status: SHIPPED | OUTPATIENT
Start: 2017-10-30 | End: 2019-11-13 | Stop reason: SDUPTHER

## 2017-10-30 RX ORDER — BLOOD-GLUCOSE CONTROL, NORMAL
EACH MISCELLANEOUS
Qty: 100 LANCET | Refills: 11 | Status: SHIPPED | OUTPATIENT
Start: 2017-10-30 | End: 2019-11-13 | Stop reason: SDUPTHER

## 2017-10-30 NOTE — MR AVS SNAPSHOT
Visit Information Date & Time Provider Department Dept. Phone Encounter #  
 10/30/2017  7:30 AM Chadwick Ahmadi MD [facility] Internal Medicine [phone] [id number] Follow-up Instructions Return in about 2 months (around 12/30/2017) for DM. Glenn Shi Upcoming Health Maintenance Date Due  
 FOOT EXAM Q1 4/9/1976 EYE EXAM RETINAL OR DILATED Q1 4/9/1976 DTaP/Tdap/Td series (1 - Tdap) 4/9/1987 GLAUCOMA SCREENING Q2Y 4/9/2016 FOBT Q 1 YEAR AGE 50-75 4/9/2016 HEMOGLOBIN A1C Q6M 3/26/2018 MICROALBUMIN Q1 9/26/2018 LIPID PANEL Q1 9/26/2018 Allergies as of 10/30/2017  Review Complete On: 10/30/2017 By: Geovani Bunch MD  
 No Known Allergies Current Immunizations  Reviewed on 9/26/2017 No immunizations on file. Not reviewed this visit You Were Diagnosed With   
  
 Codes Comments Type 2 diabetes mellitus without complication, without long-term current use of insulin (HCC)    -  Primary ICD-10-CM: E11.9 ICD-9-CM: 250.00 Vitals BP Pulse Temp Resp Height(growth percentile) Weight(growth percentile) 123/85 (BP 1 Location: Left arm, BP Patient Position: Sitting) 74 97.3 °F (36.3 °C) (Oral) 18 5' 7\" (1.702 m) 252 lb 3.2 oz (114.4 kg) SpO2 BMI Smoking Status 96% 39.5 kg/m2 Current Every Day Smoker Vitals History BMI and BSA Data Body Mass Index Body Surface Area  
 39.5 kg/m 2 2.33 m 2 Preferred Pharmacy Pharmacy Name Phone [facility] [phone] Your Updated Medication List  
  
   
This list is accurate as of: 10/30/17  8:05 AM.  Always use your most recent med list.  
  
  
  
  
 Blood-Glucose Meter monitoring kit Commonly known as:  FREESTYLE LITE METER Check blood sugar in AM and 2 hours after dinner. glipiZIDE 10 mg tablet Commonly known as:  Caroline Castaneda Take 1 Tab by mouth two (2) times a day. glucose blood VI test strips strip Commonly known as:  FREESTYLE LITE STRIPS Check blood sugar in AM and 2 hours after dinner. lancets 28 gauge Misc Commonly known as:  FREESTYLE LANCETS Check blood sugar in AM and 2 hours after dinner. metFORMIN 1,000 mg tablet Commonly known as:  GLUCOPHAGE Take 1 Tab by mouth two (2) times daily (with meals). SITagliptin 50 mg tablet Commonly known as:  Lesa Puenteini Take 1 Tab by mouth daily. Prescriptions Sent to Pharmacy Refills SITagliptin (JANUVIA) 50 mg tablet 2 Sig: Take 1 Tab by mouth daily. Class: Normal  
 Pharmacy: [facility] [address] Ph #: [phone] Route: Oral  
  
Follow-up Instructions Return in about 2 months (around 12/30/2017) for DM. Fela Packer Introducing [facility]! [facility] introduces [employer] patient portal. Now you can access parts of your medical record, email your doctor's office, and request medication refills online. 1. In your internet browser, go to [email or web address] 2. Click on the First Time User? Click Here link in the Sign In box. You will see the New Member Sign Up page. 3. Enter your [employer] Access Code exactly as it appears below. You will not need to use this code after youve completed the sign-up process. If you do not sign up before the expiration date, you must request a new code. · [employer] Access Code: [email or web address] Expires: 11/9/2017 11:37 AM 
 
4. Enter the last four digits of your Social Security Number (xxxx) and Date of Birth (mm/dd/yyyy) as indicated and click Submit. You will be taken to the next sign-up page. 5. Create a [employer] ID. This will be your [employer] login ID and cannot be changed, so think of one that is secure and easy to remember. 6. Create a [employer] password. You can change your password at any time. 7. Enter your Password Reset Question and Answer. This can be used at a later time if you forget your password. 8. Enter your e-mail address. You will receive e-mail notification when new information is available in [address] 9. Click Sign Up. You can now view and download portions of your medical record. 10. Click the Download Summary menu link to download a portable copy of your medical information. If you have questions, please visit the Frequently Asked Questions section of the [employer] website. Remember, [employer] is NOT to be used for urgent needs. For medical emergencies, dial 911. Now available from your iPhone and Android! Please provide this summary of care documentation to your next provider. Your primary care clinician is listed as Chadwick Freitas. If you have any questions after today's visit, please call [phone].

## 2017-10-30 NOTE — PROGRESS NOTES
Subjective:     Chief Complaint   Patient presents with    Diabetes     1 mo f/u        He  is a 47y.o. year old male with diabetes who presents today for one month diabetic follow up. After not taking any med for over a year he was restarted his med last month. Last month A1c was 12.8. He has started taking metformin 1000 mg BID and glipizide 10 mg BID. Reports that he sometimes forget to take the nighttime dose, happen rarely through. Did not check blood sugar at home. Denies any chest pain, abdominal pain, diarrhea, hypoglycemia events. Pertinent items are noted in HPI.   Objective:     Vitals:    10/30/17 0745   BP: 123/85   Pulse: 74   Resp: 18   Temp: 97.3 °F (36.3 °C)   TempSrc: Oral   SpO2: 96%   Weight: 252 lb 3.2 oz (114.4 kg)   Height: 5' 7\" (1.702 m)       Physical Examination: General appearance - alert, well appearing, and in no distress, oriented to person, place, and time and overweight  Mental status - alert, oriented to person, place, and time, normal mood, behavior, speech, dress, motor activity, and thought processes  Chest - clear to auscultation, no wheezes, rales or rhonchi, symmetric air entry  Heart - normal rate, regular rhythm, normal S1, S2, no murmurs, rubs, clicks or gallops  Neurological - alert, oriented, normal speech, no focal findings or movement disorder noted    No Known Allergies   Social History     Social History    Marital status: SINGLE     Spouse name: N/A    Number of children: N/A    Years of education: N/A     Social History Main Topics    Smoking status: Current Every Day Smoker     Packs/day: 1.00    Smokeless tobacco: Never Used    Alcohol use Yes      Comment: holidays, several timses a yr    Drug use: No    Sexual activity: Yes     Partners: Female     Other Topics Concern    None     Social History Narrative      Family History   Problem Relation Age of Onset    Diabetes Mother     Alcohol abuse Father     No Known Problems Sister     Diabetes Brother     Stroke Brother     No Known Problems Brother     No Known Problems Brother     No Known Problems Sister       History reviewed. No pertinent surgical history. Past Medical History:   Diagnosis Date    Abscess     Diabetes ([address]     Hypercholesterolemia     Sleep apnea       Current Outpatient Prescriptions   Medication Sig Dispense Refill    SITagliptin (JANUVIA) 50 mg tablet Take 1 Tab by mouth daily. 30 Tab 2    metFORMIN (GLUCOPHAGE) 1,000 mg tablet Take 1 Tab by mouth two (2) times daily (with meals). 60 Tab 2    glipiZIDE (GLUCOTROL) 10 mg tablet Take 1 Tab by mouth two (2) times a day. 60 Tab 2    glucose blood VI test strips (FREESTYLE LITE STRIPS) strip Check blood sugar in AM and 2 hours after dinner. 100 Strip 11    Blood-Glucose Meter (FREESTYLE LITE METER) monitoring kit Check blood sugar in AM and 2 hours after dinner. 1 Kit 0    lancets (FREESTYLE LANCETS) 28 gauge misc Check blood sugar in AM and 2 hours after dinner. 100 Lancet 11        Assessment/ Plan:   Diagnoses and all orders for this visit:    1. Type 2 diabetes mellitus without complication, without long-term current use of insulin (HCC)  -     Start SITagliptin (JANUVIA) 50 mg tablet; Take 1 Tab by mouth daily. Continue metformin and Glipizide. Diet and exercise discussed. -     AMB POC GLUCOSE, QUANTITATIVE, BLOOD is 237. Discussed med compliance   -     Strongly advised to check blood sugar at home at least BID. Advised to call me if there is any issue arrive with med coverage. Medication risks/benefits/costs/interactions/alternatives discussed with patient. Advised patient to call back or return to office if symptoms worsen/change/persist. If patient cannot reach us or should anything more severe/urgent arise he/she should proceed directly to the nearest emergency department. Discussed expected course/resolution/complications of diagnosis in detail with patient.   Patient given a written after visit summary which includes her diagnoses, current medications and vitals. Patient expressed understanding with the diagnosis and plan. Follow-up Disposition:  Return in about 2 months (around 12/30/2017) for DM. Gutierrez Lala

## 2017-10-30 NOTE — TELEPHONE ENCOUNTER
Pharmacist states that pt's Norberto [place] is covered at $5. Also states that the pt's Freestyle meter is covered but not the strips or lancets. Pharmacist requests that we send new prescription for onetouch ultra meter, strips, and lancets.

## 2018-04-22 DIAGNOSIS — E11.9 TYPE 2 DIABETES MELLITUS WITHOUT COMPLICATION, WITHOUT LONG-TERM CURRENT USE OF INSULIN (HCC): ICD-10-CM

## 2018-04-23 RX ORDER — METFORMIN HYDROCHLORIDE 1000 MG/1
TABLET ORAL
Qty: 30 TAB | Refills: 0 | Status: SHIPPED | OUTPATIENT
Start: 2018-04-23 | End: 2018-05-01 | Stop reason: SDUPTHER

## 2018-04-23 RX ORDER — GLIPIZIDE 10 MG/1
TABLET ORAL
Qty: 30 TAB | Refills: 0 | Status: SHIPPED | OUTPATIENT
Start: 2018-04-23 | End: 2018-05-01 | Stop reason: SDUPTHER

## 2018-05-01 ENCOUNTER — OFFICE VISIT (OUTPATIENT)
Dept: INTERNAL MEDICINE CLINIC | Age: 53
End: 2018-05-01

## 2018-05-01 VITALS
SYSTOLIC BLOOD PRESSURE: 133 MMHG | OXYGEN SATURATION: 100 % | WEIGHT: 262 LBS | RESPIRATION RATE: 18 BRPM | DIASTOLIC BLOOD PRESSURE: 84 MMHG | HEART RATE: 92 BPM | HEIGHT: 67 IN | BODY MASS INDEX: 41.12 KG/M2 | TEMPERATURE: 96.4 F

## 2018-05-01 PROBLEM — E66.01 OBESITY, MORBID (HCC): Status: ACTIVE | Noted: 2018-05-01

## 2018-05-01 LAB — HBA1C MFR BLD HPLC: 12.3 %

## 2018-05-01 RX ORDER — GLIPIZIDE 10 MG/1
TABLET ORAL
Qty: 60 TAB | Refills: 3 | Status: SHIPPED | OUTPATIENT
Start: 2018-05-01 | End: 2018-06-01 | Stop reason: SDUPTHER

## 2018-05-01 RX ORDER — METFORMIN HYDROCHLORIDE 1000 MG/1
TABLET ORAL
Qty: 60 TAB | Refills: 3 | Status: SHIPPED | OUTPATIENT
Start: 2018-05-01 | End: 2018-06-01 | Stop reason: SDUPTHER

## 2018-05-01 NOTE — PROGRESS NOTES
Subjective:     Chief Complaint   Patient presents with    Diabetes        He  is a 47y.o. year old male with DM-2 who presents after 6 months for follow up. Last A1c was 12.8. He was suppose to take metformin, Glipizide and Januvia but he said the pharmacist never gave  the [address]. He did not want to check BS because the diabetic supplies are too expensive for him. Patient denies any blurred vision, urinary symptoms, chest pain, soa. A comprehensive review of systems was negative except for that written in the HPI. Objective:     Vitals:    05/01/18 0741   BP: 133/84   Pulse: 92   Resp: 18   Temp: 96.4 °F (35.8 °C)   TempSrc: Temporal   SpO2: 100%   Weight: 262 lb (118.8 kg)   Height: 5' 7\" (1.702 m)       Physical Examination: General appearance - alert, well appearing, and in no distress, oriented to person, place, and time and overweight  Mental status - alert, oriented to person, place, and time, normal mood, behavior, speech, dress, motor activity, and thought processes  Ears - bilateral TM's and external ear canals normal  Nose - normal and patent, no erythema, discharge or polyps  Mouth - mucous membranes moist, pharynx normal without lesions  Neck - supple, no significant adenopathy  Chest - clear to auscultation, no wheezes, rales or rhonchi, symmetric air entry  Heart - normal rate, regular rhythm, normal S1, S2, no murmurs, rubs, clicks or gallops  Extremities - no pedal edema noted.     No Known Allergies   Social History     Social History    Marital status: SINGLE     Spouse name: N/A    Number of children: N/A    Years of education: N/A     Social History Main Topics    Smoking status: Current Every Day Smoker     Packs/day: 1.00    Smokeless tobacco: Never Used    Alcohol use Yes      Comment: holidays, several timses a yr    Drug use: No    Sexual activity: Yes     Partners: Female     Other Topics Concern    None     Social History Narrative      Family History   Problem Relation Age of Onset    Diabetes Mother     Alcohol abuse Father     No Known Problems Sister     Diabetes Brother     Stroke Brother     No Known Problems Brother     No Known Problems Brother     No Known Problems Sister       History reviewed. No pertinent surgical history. Past Medical History:   Diagnosis Date    Abscess     [address]     Hypercholesterolemia     Sleep apnea       Current Outpatient Prescriptions   Medication Sig Dispense Refill    Liraglutide (VICTOZA) 0.6 mg/0.1 mL (18 mg/3 mL) pnij 0.6 mg by SubCUTAneous route daily. Indications: type 2 diabetes mellitus 30 Pen 2    metFORMIN (GLUCOPHAGE) 1,000 mg tablet TAKE 1 TABLET BY MOUTH TWICE DAILY WITH MEALS 60 Tab 3    glipiZIDE (GLUCOTROL) 10 mg tablet TAKE 1 TABLET OP TWICE DAILY. NEED APPOINTMENT FOR NEXT REFILL 60 Tab 3    glucose blood VI test strips (ONETOUCH ULTRA TEST) strip Check blood sugar in AM ( fasting ) and 2 hours after dinner. 100 Strip 11    Blood-Glucose Meter (ONETOUCH ULTRA2) monitoring kit Check blood sugar in AM ( fasting ) and 2 hours after dinner. 1 Kit 0    lancets (ONETOUCH DELICA LANCETS) 30 gauge misc Check blood sugar in AM ( fasting ) and 2 hours after dinner. 100 Lancet 11        Assessment/ Plan:   Diagnoses and all orders for this visit:    1. Uncontrolled type 2 diabetes mellitus without complication, without long-term current use of insulin (HCC)  -     AMB POC HEMOGLOBIN A1C is 12.3. After a long discussion about different Rx options including insulin we have decided to start Victoza. Discussed the side effect of the med. Advised to continue metformin and Glipizide as prescribed. Counseled on med compliance and follow up visit. Counseled on diet and exercise. -     Liraglutide (VICTOZA) 0.6 mg/0.1 mL (18 mg/3 mL) pnij; 0.6 mg by SubCUTAneous route daily.  Indications: type 2 diabetes mellitus  -     metFORMIN (GLUCOPHAGE) 1,000 mg tablet; TAKE 1 TABLET BY MOUTH TWICE DAILY WITH MEALS  -     glipiZIDE (GLUCOTROL) 10 mg tablet; TAKE 1 TABLET OP TWICE DAILY. NEED APPOINTMENT FOR NEXT REFILL  -     LIPID PANEL  -     METABOLIC PANEL, COMPREHENSIVE  -     REFERRAL TO OPHTHALMOLOGY    2. BMI 40.0-44.9, adult [facility])     Discussed the patient's BMI with him. The BMI follow up plan is as follows:     dietary management education, guidance, and counseling  encourage exercise  monitor weight  prescribed dietary intake    Spent >50 % time counseling. Medication risks/benefits/costs/interactions/alternatives discussed with patient. Advised patient to call back or return to office if symptoms worsen/change/persist. If patient cannot reach us or should anything more severe/urgent arise he/she should proceed directly to the nearest emergency department. Discussed expected course/resolution/complications of diagnosis in detail with patient. Patient given a written after visit summary which includes her diagnoses, current medications and vitals. Patient expressed understanding with the diagnosis and plan. Follow-up Disposition:  Return in about 1 month (around 6/1/2018) for Bring diabetic log book. .      An After Visit Summary was printed and given to the patient.

## 2018-05-01 NOTE — LETTER
5/2/2018 2:18 PM 
 
Mr. Diedra Skiff [id number] [phone] Dear Diedra Skiff: 
 
Please find your most recent results below. Resulted Orders AMB POC HEMOGLOBIN A1C Result Value Ref Range Hemoglobin A1c (POC) 12.3 % LIPID PANEL Result Value Ref Range Cholesterol, total 177 100 - 199 mg/dL Triglyceride 94 0 - 149 mg/dL HDL Cholesterol 41 >39 mg/dL VLDL, calculated 19 5 - 40 mg/dL LDL, calculated 117 (H) 0 - 99 mg/dL Narrative Performed at:  [address]  [id number] : Naresh Winter MD, Phone:  [phone] METABOLIC PANEL, COMPREHENSIVE Result Value Ref Range Glucose 352 (H) 65 - 99 mg/dL BUN 10 6 - 24 mg/dL Creatinine 1.03 0.76 - 1.27 mg/dL GFR est non-AA 83 >59 mL/min/1.73 GFR est AA 96 >59 mL/min/1.73  
 BUN/Creatinine ratio 10 9 - 20 Sodium 136 134 - 144 mmol/L Potassium 4.6 3.5 - 5.2 mmol/L Chloride 97 96 - 106 mmol/L  
 CO2 24 18 - 29 mmol/L Calcium 9.4 8.7 - 10.2 mg/dL Protein, total 6.9 6.0 - 8.5 g/dL Albumin 3.9 3.5 - 5.5 g/dL GLOBULIN, TOTAL 3.0 1.5 - 4.5 g/dL A-G Ratio 1.3 1.2 - 2.2 Bilirubin, total 0.3 0.0 - 1.2 mg/dL Alk. phosphatase 97 39 - 117 IU/L  
 AST (SGOT) 12 0 - 40 IU/L  
 ALT (SGPT) 19 0 - 44 IU/L Narrative Performed at:  [address]  [id number] : Naresh Winter MD, Phone:  [phone] CVD REPORT Result Value Ref Range INTERPRETATION Note Comment:  
   Supplemental report is available. Narrative Performed at:  [address] [place] [address]  [id number] : Yoni Ramos MD, Phone:  [phone] DIABETES PATIENT EDUCATION Result Value Ref Range PDF Image Not applicable Narrative Performed at:  [address] [place] [address]  [id number] : Sabi Baer MD, Phone:  [phone] RECOMMENDATIONS: 
 
Kidney and liver function is normal.  
 
LDL level is not in goal.  I will like you to start on Lipitor 20 mg . The prescription has been sent to your pharmacy. Lipitor is taken  once daily with the evening meal.This type of drug is usually highly effective to lower LDL cholesterol and is usually very well tolerated. However, statin-type drugs can potentially injure the liver. Blood tests will be required every 3 months for the first 6 months of therapy, and at 6-12 month intervals thereafter.  Damage to the liver, if detected early, can be reversed by stopping the drug.  Be alert for persistent nausea, abdominal pain, or yellow jaundice, and report such to me directly. Statin drugs may also cause skeletal muscle injury in rare cases. Be alert for pronounced persistent diffuse muscle pain and discontinue the drug immediately should such symptoms develop. Plan to make a lab appointment in 3 months to recheck your labs. Please call me if you have any questions: [phone] Sincerely, 
 
 
Bowen Palma MD

## 2018-05-01 NOTE — MR AVS SNAPSHOT
[facility]. [address] 
[phone] Patient: Carlos Aragon MRN: [id number] :[id number] Visit Information Date & Time Provider Department Dept. Phone Encounter #  
 2018  7:30 AM Chadwick Hawkins MD [facility] Internal Medicine [phone] [id number] Follow-up Instructions Return in about 1 month (around 2018) for Bring diabetic log book. Allan Clark Upcoming Health Maintenance Date Due  
 FOOT EXAM Q1 1976 EYE EXAM RETINAL OR DILATED Q1 1976 DTaP/Tdap/Td series (1 - Tdap) 1987 GLAUCOMA SCREENING Q2Y 2016 FOBT Q 1 YEAR AGE 50-75 2016 HEMOGLOBIN A1C Q6M 3/26/2018 Influenza Age 5 to Adult 2018 MICROALBUMIN Q1 2018 LIPID PANEL Q1 2018 Allergies as of 2018  Review Complete On: 2018 By: Analilia Boudreaux MD  
 No Known Allergies Current Immunizations  Reviewed on 10/30/2017 No immunizations on file. Not reviewed this visit You Were Diagnosed With   
  
 Codes Comments Uncontrolled type 2 diabetes mellitus without complication, without long-term current use of insulin ([facility]    -  Primary ICD-10-CM: E11.65 ICD-9-CM: 250.02 BMI 40.0-44.9, adult [facility])     ICD-10-CM: Z68.41 
ICD-9-CM: V85.41 Vitals BP Pulse Temp Resp Height(growth percentile) 133/84 (BP 1 Location: Left arm, BP Patient Position: Sitting) 92 96.4 °F (35.8 °C) (Temporal) 18 5' 7\" (1.702 m) Weight(growth percentile) SpO2 BMI Smoking Status 262 lb (118.8 kg) 100% 41.04 kg/m2 Current Every Day Smoker Vitals History BMI and BSA Data Body Mass Index Body Surface Area 41.04 kg/m 2 2.37 m 2 Preferred Pharmacy Pharmacy Name Phone [facility] [address] [phone] Your Updated Medication List  
  
   
 This list is accurate as of 18  8:09 AM.  Always use your most recent med list.  
  
  
  
  
 Blood-Glucose Meter monitoring kit Commonly known as:  Prema Custer Check blood sugar in AM ( fasting ) and 2 hours after dinner. glipiZIDE 10 mg tablet Commonly known as:  GLUCOTROL  
TAKE 1 TABLET OP TWICE DAILY. NEED APPOINTMENT FOR NEXT REFILL  
  
 glucose blood VI test strips strip Commonly known as:  ONETOUCH ULTRA TEST Check blood sugar in AM ( fasting ) and 2 hours after dinner. lancets 30 gauge Misc Commonly known as:  Marivel Gell LANCETS Check blood sugar in AM ( fasting ) and 2 hours after dinner. Liraglutide 0.6 mg/0.1 mL (18 mg/3 mL) Pnij Commonly known as:  VICTOZA  
0.6 mg by SubCUTAneous route daily. Indications: type 2 diabetes mellitus  
  
 metFORMIN 1,000 mg tablet Commonly known as:  GLUCOPHAGE  
TAKE 1 TABLET BY MOUTH TWICE DAILY WITH MEALS Prescriptions Sent to Pharmacy Refills Liraglutide (VICTOZA) 0.6 mg/0.1 mL (18 mg/3 mL) pnij 2 Si.6 mg by SubCUTAneous route daily. Indications: type 2 diabetes mellitus Class: Normal  
 Pharmacy: [facility] [address], [address] [place], [place] [address]Danielle Ph #: [phone] Route: SubCUTAneous  
 metFORMIN (GLUCOPHAGE) 1,000 mg tablet 3 Sig: TAKE 1 TABLET BY MOUTH TWICE DAILY WITH MEALS Class: Normal  
 Pharmacy: [facility] [address], [address] [place] [address] Ph #: [phone]  
 glipiZIDE (GLUCOTROL) 10 mg tablet 3 Sig: TAKE 1 TABLET OP TWICE DAILY. NEED APPOINTMENT FOR NEXT REFILL Class: Normal  
 Pharmacy: [facility] [address], [address] [place] [address] Ph #: [phone] We Performed the Following AMB POC HEMOGLOBIN A1C [26702 CPT(R)] LIPID PANEL [86688 CPT(R)] METABOLIC PANEL, COMPREHENSIVE [68594 CPT(R)] REFERRAL TO OPHTHALMOLOGY [REF57 Custom] Follow-up Instructions Return in about 1 month (around 6/1/2018) for Bring diabetic log book. Herb Guallpa Referral Information Referral ID Referred By Referred To  
  
 [id number] ERIKA SCHWARZMAT ARMANDO [address] [place] [address] Phone: [phone] Fax: [phone] Visits Status Start Date End Date 1 New Request 5/1/18 5/1/19 If your referral has a status of pending review or denied, additional information will be sent to support the outcome of this decision. Patient Instructions Body Mass Index: Care Instructions Your Care Instructions Body mass index (BMI) can help you see if your weight is raising your risk for health problems. It uses a formula to compare how much you weigh with how tall you are. · A BMI lower than 18.5 is considered underweight. · A BMI between 18.5 and 24.9 is considered healthy. · A BMI between 25 and 29.9 is considered overweight. A BMI of 30 or higher is considered obese. If your BMI is in the normal range, it means that you have a lower risk for weight-related health problems. If your BMI is in the overweight or obese range, you may be at increased risk for weight-related health problems, such as high blood pressure, heart disease, stroke, arthritis or joint pain, and diabetes. If your BMI is in the underweight range, you may be at increased risk for health problems such as fatigue, lower protection (immunity) against illness, muscle loss, bone loss, hair loss, and hormone problems. BMI is just one measure of your risk for weight-related health problems. You may be at higher risk for health problems if you are not active, you eat an unhealthy diet, or you drink too much alcohol or use tobacco products. Follow-up care is a key part of your treatment and safety.  Be sure to make and go to all appointments, and call your doctor if you are having problems. It's also a good idea to know your test results and keep a list of the medicines you take. How can you care for yourself at home? · Practice healthy eating habits. This includes eating plenty of fruits, vegetables, whole grains, lean protein, and low-fat dairy. · If your doctor recommends it, get more exercise. Walking is a good choice. Bit by bit, increase the amount you walk every day. Try for at least 30 minutes on most days of the week. · Do not smoke. Smoking can increase your risk for health problems. If you need help quitting, talk to your doctor about stop-smoking programs and medicines. These can increase your chances of quitting for good. · Limit alcohol to 2 drinks a day for men and 1 drink a day for women. Too much alcohol can cause health problems. If you have a BMI higher than 25 · Your doctor may do other tests to check your risk for weight-related health problems. This may include measuring the distance around your waist. A waist measurement of more than 40 inches in men or 35 inches in women can increase the risk of weight-related health problems. · Talk with your doctor about steps you can take to stay healthy or improve your health. You may need to make lifestyle changes to lose weight and stay healthy, such as changing your diet and getting regular exercise. If you have a BMI lower than 18.5 · Your doctor may do other tests to check your risk for health problems. · Talk with your doctor about steps you can take to stay healthy or improve your health. You may need to make lifestyle changes to gain or maintain weight and stay healthy, such as getting more healthy foods in your diet and doing exercises to build muscle. Where can you learn more? Go to http://dale-fernie.info/. Enter S176 in the search box to learn more about \"Body Mass Index: Care Instructions. \" Current as of: October 13, 2016 Content Version: 11.4 © 0299-2873 Healthwise, AppMyDay. Care instructions adapted under license by [employer] (which disclaims liability or warranty for this information). If you have questions about a medical condition or this instruction, always ask your healthcare professional. [address] any warranty or liability for your use of this information. Body Mass Index: Care Instructions Your Care Instructions Body mass index (BMI) can help you see if your weight is raising your risk for health problems. It uses a formula to compare how much you weigh with how tall you are. · A BMI lower than 18.5 is considered underweight. · A BMI between 18.5 and 24.9 is considered healthy. · A BMI between 25 and 29.9 is considered overweight. A BMI of 30 or higher is considered obese. If your BMI is in the normal range, it means that you have a lower risk for weight-related health problems. If your BMI is in the overweight or obese range, you may be at increased risk for weight-related health problems, such as high blood pressure, heart disease, stroke, arthritis or joint pain, and diabetes. If your BMI is in the underweight range, you may be at increased risk for health problems such as fatigue, lower protection (immunity) against illness, muscle loss, bone loss, hair loss, and hormone problems. BMI is just one measure of your risk for weight-related health problems. You may be at higher risk for health problems if you are not active, you eat an unhealthy diet, or you drink too much alcohol or use tobacco products. Follow-up care is a key part of your treatment and safety. Be sure to make and go to all appointments, and call your doctor if you are having problems. It's also a good idea to know your test results and keep a list of the medicines you take. How can you care for yourself at home? · Practice healthy eating habits.  This includes eating plenty of fruits, vegetables, whole grains, lean protein, and low-fat dairy. · If your doctor recommends it, get more exercise. Walking is a good choice. Bit by bit, increase the amount you walk every day. Try for at least 30 minutes on most days of the week. · Do not smoke. Smoking can increase your risk for health problems. If you need help quitting, talk to your doctor about stop-smoking programs and medicines. These can increase your chances of quitting for good. · Limit alcohol to 2 drinks a day for men and 1 drink a day for women. Too much alcohol can cause health problems. If you have a BMI higher than 25 · Your doctor may do other tests to check your risk for weight-related health problems. This may include measuring the distance around your waist. A waist measurement of more than 40 inches in men or 35 inches in women can increase the risk of weight-related health problems. · Talk with your doctor about steps you can take to stay healthy or improve your health. You may need to make lifestyle changes to lose weight and stay healthy, such as changing your diet and getting regular exercise. If you have a BMI lower than 18.5 · Your doctor may do other tests to check your risk for health problems. · Talk with your doctor about steps you can take to stay healthy or improve your health. You may need to make lifestyle changes to gain or maintain weight and stay healthy, such as getting more healthy foods in your diet and doing exercises to build muscle. Where can you learn more? Go to http://dale-fernie.info/. Enter S176 in the search box to learn more about \"Body Mass Index: Care Instructions. \" Current as of: October 13, 2016 Content Version: 11.4 © 5140-0687 Healthwise, Incorporated. Care instructions adapted under license by [employer] (which disclaims liability or warranty for this information).  If you have questions about a medical condition or this instruction, always ask your healthcare professional. [place] [address] any warranty or liability for your use of this information. Introducing [facility] & HEALTH SERVICES! [employer] introduces [facility] patient portal. Now you can access parts of your medical record, email your doctor's office, and request medication refills online. 1. In your internet browser, go to [email or web address] To Be Chict 2. Click on the First Time User? Click Here link in the Sign In box. You will see the New Member Sign Up page. 3. Enter your [facility] Access Code exactly as it appears below. You will not need to use this code after youve completed the sign-up process. If you do not sign up before the expiration date, you must request a new code. · [facility] Access Code: [email or web address] Expires: 7/30/2018  7:42 AM 
 
4. Enter the last four digits of your Social Security Number (xxxx) and Date of Birth (mm/dd/yyyy) as indicated and click Submit. You will be taken to the next sign-up page. 5. Create a [facility] ID. This will be your [facility] login ID and cannot be changed, so think of one that is secure and easy to remember. 6. Create a [facility] password. You can change your password at any time. 7. Enter your Password Reset Question and Answer. This can be used at a later time if you forget your password. 8. Enter your e-mail address. You will receive e-mail notification when new information is available in [address] 9. Click Sign Up. You can now view and download portions of your medical record. 10. Click the Download Summary menu link to download a portable copy of your medical information. If you have questions, please visit the Frequently Asked Questions section of the [facility] website. Remember, [facility] is NOT to be used for urgent needs. For medical emergencies, dial 911. Now available from your iPhone and Android! Please provide this summary of care documentation to your next provider. Your primary care clinician is listed as Chadwick Freitas. If you have any questions after today's visit, please call [phone].

## 2018-05-01 NOTE — PATIENT INSTRUCTIONS
Body Mass Index: Care Instructions  Your Care Instructions    Body mass index (BMI) can help you see if your weight is raising your risk for health problems. It uses a formula to compare how much you weigh with how tall you are. · A BMI lower than 18.5 is considered underweight. · A BMI between 18.5 and 24.9 is considered healthy. · A BMI between 25 and 29.9 is considered overweight. A BMI of 30 or higher is considered obese. If your BMI is in the normal range, it means that you have a lower risk for weight-related health problems. If your BMI is in the overweight or obese range, you may be at increased risk for weight-related health problems, such as high blood pressure, heart disease, stroke, arthritis or joint pain, and diabetes. If your BMI is in the underweight range, you may be at increased risk for health problems such as fatigue, lower protection (immunity) against illness, muscle loss, bone loss, hair loss, and hormone problems. BMI is just one measure of your risk for weight-related health problems. You may be at higher risk for health problems if you are not active, you eat an unhealthy diet, or you drink too much alcohol or use tobacco products. Follow-up care is a key part of your treatment and safety. Be sure to make and go to all appointments, and call your doctor if you are having problems. It's also a good idea to know your test results and keep a list of the medicines you take. How can you care for yourself at home? · Practice healthy eating habits. This includes eating plenty of fruits, vegetables, whole grains, lean protein, and low-fat dairy. · If your doctor recommends it, get more exercise. Walking is a good choice. Bit by bit, increase the amount you walk every day. Try for at least 30 minutes on most days of the week. · Do not smoke. Smoking can increase your risk for health problems. If you need help quitting, talk to your doctor about stop-smoking programs and medicines. These can increase your chances of quitting for good. · Limit alcohol to 2 drinks a day for men and 1 drink a day for women. Too much alcohol can cause health problems. If you have a BMI higher than 25  · Your doctor may do other tests to check your risk for weight-related health problems. This may include measuring the distance around your waist. A waist measurement of more than 40 inches in men or 35 inches in women can increase the risk of weight-related health problems. · Talk with your doctor about steps you can take to stay healthy or improve your health. You may need to make lifestyle changes to lose weight and stay healthy, such as changing your diet and getting regular exercise. If you have a BMI lower than 18.5  · Your doctor may do other tests to check your risk for health problems. · Talk with your doctor about steps you can take to stay healthy or improve your health. You may need to make lifestyle changes to gain or maintain weight and stay healthy, such as getting more healthy foods in your diet and doing exercises to build muscle. Where can you learn more? Go to http://dale-fernie.info/. Enter S176 in the search box to learn more about \"Body Mass Index: Care Instructions. \"  Current as of: October 13, 2016  Content Version: 11.4  © 2465-0709 SnapMyAd. Care instructions adapted under license by [employer] (which disclaims liability or warranty for this information). If you have questions about a medical condition or this instruction, always ask your healthcare professional. [address] any warranty or liability for your use of this information. Body Mass Index: Care Instructions  Your Care Instructions    Body mass index (BMI) can help you see if your weight is raising your risk for health problems. It uses a formula to compare how much you weigh with how tall you are.   · A BMI lower than 18.5 is considered underweight. · A BMI between 18.5 and 24.9 is considered healthy. · A BMI between 25 and 29.9 is considered overweight. A BMI of 30 or higher is considered obese. If your BMI is in the normal range, it means that you have a lower risk for weight-related health problems. If your BMI is in the overweight or obese range, you may be at increased risk for weight-related health problems, such as high blood pressure, heart disease, stroke, arthritis or joint pain, and diabetes. If your BMI is in the underweight range, you may be at increased risk for health problems such as fatigue, lower protection (immunity) against illness, muscle loss, bone loss, hair loss, and hormone problems. BMI is just one measure of your risk for weight-related health problems. You may be at higher risk for health problems if you are not active, you eat an unhealthy diet, or you drink too much alcohol or use tobacco products. Follow-up care is a key part of your treatment and safety. Be sure to make and go to all appointments, and call your doctor if you are having problems. It's also a good idea to know your test results and keep a list of the medicines you take. How can you care for yourself at home? · Practice healthy eating habits. This includes eating plenty of fruits, vegetables, whole grains, lean protein, and low-fat dairy. · If your doctor recommends it, get more exercise. Walking is a good choice. Bit by bit, increase the amount you walk every day. Try for at least 30 minutes on most days of the week. · Do not smoke. Smoking can increase your risk for health problems. If you need help quitting, talk to your doctor about stop-smoking programs and medicines. These can increase your chances of quitting for good. · Limit alcohol to 2 drinks a day for men and 1 drink a day for women. Too much alcohol can cause health problems.   If you have a BMI higher than 25  · Your doctor may do other tests to check your risk for weight-related health problems. This may include measuring the distance around your waist. A waist measurement of more than 40 inches in men or 35 inches in women can increase the risk of weight-related health problems. · Talk with your doctor about steps you can take to stay healthy or improve your health. You may need to make lifestyle changes to lose weight and stay healthy, such as changing your diet and getting regular exercise. If you have a BMI lower than 18.5  · Your doctor may do other tests to check your risk for health problems. · Talk with your doctor about steps you can take to stay healthy or improve your health. You may need to make lifestyle changes to gain or maintain weight and stay healthy, such as getting more healthy foods in your diet and doing exercises to build muscle. Where can you learn more? Go to http://dale-fernie.info/. Enter S176 in the search box to learn more about \"Body Mass Index: Care Instructions. \"  Current as of: October 13, 2016  Content Version: 11.4  © 8274-5995 Healthwise, Incorporated. Care instructions adapted under license by [employer] (which disclaims liability or warranty for this information). If you have questions about a medical condition or this instruction, always ask your healthcare professional. [address] any warranty or liability for your use of this information.

## 2018-05-02 ENCOUNTER — TELEPHONE (OUTPATIENT)
Dept: INTERNAL MEDICINE CLINIC | Age: 53
End: 2018-05-02

## 2018-05-02 DIAGNOSIS — E78.5 HYPERLIPIDEMIA WITH TARGET LDL LESS THAN 70: Primary | ICD-10-CM

## 2018-05-02 LAB
ALBUMIN SERPL-MCNC: 3.9 G/DL (ref 3.5–5.5)
ALBUMIN/GLOB SERPL: 1.3 {RATIO} (ref 1.2–2.2)
ALP SERPL-CCNC: 97 IU/L (ref 39–117)
ALT SERPL-CCNC: 19 IU/L (ref 0–44)
AST SERPL-CCNC: 12 IU/L (ref 0–40)
BILIRUB SERPL-MCNC: 0.3 MG/DL (ref 0–1.2)
BUN SERPL-MCNC: 10 MG/DL (ref 6–24)
BUN/CREAT SERPL: 10 (ref 9–20)
CALCIUM SERPL-MCNC: 9.4 MG/DL (ref 8.7–10.2)
CHLORIDE SERPL-SCNC: 97 MMOL/L (ref 96–106)
CHOLEST SERPL-MCNC: 177 MG/DL (ref 100–199)
CO2 SERPL-SCNC: 24 MMOL/L (ref 18–29)
CREAT SERPL-MCNC: 1.03 MG/DL (ref 0.76–1.27)
GFR SERPLBLD CREATININE-BSD FMLA CKD-EPI: 83 ML/MIN/1.73
GFR SERPLBLD CREATININE-BSD FMLA CKD-EPI: 96 ML/MIN/1.73
GLOBULIN SER CALC-MCNC: 3 G/DL (ref 1.5–4.5)
GLUCOSE SERPL-MCNC: 352 MG/DL (ref 65–99)
HDLC SERPL-MCNC: 41 MG/DL
INTERPRETATION, 910389: NORMAL
LDLC SERPL CALC-MCNC: 117 MG/DL (ref 0–99)
Lab: NORMAL
POTASSIUM SERPL-SCNC: 4.6 MMOL/L (ref 3.5–5.2)
PROT SERPL-MCNC: 6.9 G/DL (ref 6–8.5)
SODIUM SERPL-SCNC: 136 MMOL/L (ref 134–144)
TRIGL SERPL-MCNC: 94 MG/DL (ref 0–149)
VLDLC SERPL CALC-MCNC: 19 MG/DL (ref 5–40)

## 2018-05-02 RX ORDER — ATORVASTATIN CALCIUM 20 MG/1
20 TABLET, FILM COATED ORAL DAILY
Qty: 30 TAB | Refills: 3 | Status: SHIPPED | OUTPATIENT
Start: 2018-05-02 | End: 2018-06-01 | Stop reason: SDUPTHER

## 2018-05-02 NOTE — TELEPHONE ENCOUNTER
----- Message from Silvina Limon MD sent at 5/2/2018  1:39 PM EDT -----  Please call patient:    Kidney and liver function is normal.     LDL level is not in goal.  I will like you to start on Lipitor 20 mg . The prescription has been sent to your pharmacy. Lipitor is taken  once daily with the evening meal.This type of drug is usually highly effective to lower LDL cholesterol and is usually very well tolerated. However, statin-type drugs can potentially injure the liver. Blood tests will be required every 3 months for the first 6 months of therapy, and at 6-12 month intervals thereafter. Damage to the liver, if detected early, can be reversed by stopping the drug. Be alert for persistent nausea, abdominal pain, or yellow jaundice, and report such to me directly. Statin drugs may also cause skeletal muscle injury in rare cases. Be alert for pronounced persistent diffuse muscle pain and discontinue the drug immediately should such symptoms develop. Plan to make a lab appointment in 3 months to recheck your labs.

## 2018-05-02 NOTE — PROGRESS NOTES
Please call patient:    Kidney and liver function is normal.     LDL level is not in goal.  I will like you to start on Lipitor 20 mg . The prescription has been sent to your pharmacy. Lipitor is taken  once daily with the evening meal.This type of drug is usually highly effective to lower LDL cholesterol and is usually very well tolerated. However, statin-type drugs can potentially injure the liver. Blood tests will be required every 3 months for the first 6 months of therapy, and at 6-12 month intervals thereafter. Damage to the liver, if detected early, can be reversed by stopping the drug. Be alert for persistent nausea, abdominal pain, or yellow jaundice, and report such to me directly. Statin drugs may also cause skeletal muscle injury in rare cases. Be alert for pronounced persistent diffuse muscle pain and discontinue the drug immediately should such symptoms develop. Plan to make a lab appointment in 3 months to recheck your labs.

## 2018-05-21 DIAGNOSIS — E11.9 TYPE 2 DIABETES MELLITUS WITHOUT COMPLICATION, WITHOUT LONG-TERM CURRENT USE OF INSULIN (HCC): Primary | ICD-10-CM

## 2018-05-21 RX ORDER — PEN NEEDLE, DIABETIC 31 GX3/16"
1 NEEDLE, DISPOSABLE MISCELLANEOUS DAILY
Qty: 100 PEN NEEDLE | Refills: 3 | Status: SHIPPED | OUTPATIENT
Start: 2018-05-21 | End: 2018-06-01 | Stop reason: SDUPTHER

## 2018-06-01 ENCOUNTER — OFFICE VISIT (OUTPATIENT)
Dept: INTERNAL MEDICINE CLINIC | Age: 53
End: 2018-06-01

## 2018-06-01 VITALS
RESPIRATION RATE: 18 BRPM | WEIGHT: 245 LBS | OXYGEN SATURATION: 96 % | BODY MASS INDEX: 38.45 KG/M2 | HEIGHT: 67 IN | DIASTOLIC BLOOD PRESSURE: 81 MMHG | TEMPERATURE: 96.3 F | SYSTOLIC BLOOD PRESSURE: 120 MMHG | HEART RATE: 74 BPM

## 2018-06-01 DIAGNOSIS — E78.5 HYPERLIPIDEMIA WITH TARGET LDL LESS THAN 70: ICD-10-CM

## 2018-06-01 DIAGNOSIS — Z91.14 NON COMPLIANCE W MEDICATION REGIMEN: ICD-10-CM

## 2018-06-01 RX ORDER — METFORMIN HYDROCHLORIDE 1000 MG/1
TABLET ORAL
Qty: 60 TAB | Refills: 3 | Status: SHIPPED | OUTPATIENT
Start: 2018-06-01 | End: 2019-11-12 | Stop reason: SDUPTHER

## 2018-06-01 RX ORDER — ATORVASTATIN CALCIUM 20 MG/1
20 TABLET, FILM COATED ORAL DAILY
Qty: 30 TAB | Refills: 3 | Status: SHIPPED | OUTPATIENT
Start: 2018-06-01 | End: 2019-03-11 | Stop reason: SDUPTHER

## 2018-06-01 RX ORDER — PEN NEEDLE, DIABETIC 31 GX3/16"
1 NEEDLE, DISPOSABLE MISCELLANEOUS DAILY
Qty: 3 PEN NEEDLE | Refills: 6 | Status: SHIPPED | OUTPATIENT
Start: 2018-06-01 | End: 2020-09-30 | Stop reason: SDUPTHER

## 2018-06-01 RX ORDER — GLIPIZIDE 10 MG/1
TABLET ORAL
Qty: 60 TAB | Refills: 3 | Status: SHIPPED | OUTPATIENT
Start: 2018-06-01 | End: 2019-11-12 | Stop reason: SDUPTHER

## 2018-06-01 NOTE — PROGRESS NOTES
Subjective:     Chief Complaint   Patient presents with    Diabetes     1 mo f/u        He  is a 47y.o. year old male with DM-2, HLD who presents today for one month follow up on diabetes. Last month I have started him on Victoza and continued on metformin and Glipizide. He reports that he only took only 5 days of Victoza since he ran out insulin needle. He also  mention that he has been taking Glipizide only once/day instead of BID by mistake but has been taking metformin BID. Did not check his sugar at home as advised. Denies any cough, chest pain, soa. Lost 17 pounds in one month by watching diet. Pertinent items are noted in HPI.   Objective:     Vitals:    06/01/18 0732   BP: 120/81   Pulse: 74   Resp: 18   Temp: 96.3 °F (35.7 °C)   TempSrc: Temporal   SpO2: 96%   Weight: 245 lb (111.1 kg)   Height: 5' 7\" (1.702 m)       Physical Examination: General appearance - alert, well appearing, and in no distress, oriented to person, place, and time and overweight  Mental status - alert, oriented to person, place, and time, normal mood, behavior, speech, dress, motor activity, and thought processes  Chest - clear to auscultation, no wheezes, rales or rhonchi, symmetric air entry  Heart - normal rate, regular rhythm, normal S1, S2, no murmurs, rubs, clicks or gallops    No Known Allergies   Social History     Social History    Marital status: SINGLE     Spouse name: N/A    Number of children: N/A    Years of education: N/A     Social History Main Topics    Smoking status: Current Every Day Smoker     Packs/day: 1.00    Smokeless tobacco: Never Used    Alcohol use Yes      Comment: holidays, several timses a yr    Drug use: No    Sexual activity: Yes     Partners: Female     Other Topics Concern    None     Social History Narrative      Family History   Problem Relation Age of Onset    Diabetes Mother     Alcohol abuse Father     No Known Problems Sister     Diabetes Brother     Stroke Brother     No Known Problems Brother     No Known Problems Brother     No Known Problems Sister       History reviewed. No pertinent surgical history. Past Medical History:   Diagnosis Date    Abscess     Diabetes ([facility]     Hypercholesterolemia     Sleep apnea       Current Outpatient Prescriptions   Medication Sig Dispense Refill    glipiZIDE (GLUCOTROL) 10 mg tablet TAKE 1 TABLET OP TWICE DAILY. 60 Tab 3    Insulin Needles, Disposable, (KYLAH PEN NEEDLE) 32 gauge x 5/32\" ndle 1 Units by SubCUTAneous route daily. 3 Pen Needle 6    atorvastatin (LIPITOR) 20 mg tablet Take 1 Tab by mouth daily. 30 Tab 3    metFORMIN (GLUCOPHAGE) 1,000 mg tablet TAKE 1 TABLET BY MOUTH TWICE DAILY WITH MEALS 60 Tab 3    Liraglutide (VICTOZA) 0.6 mg/0.1 mL (18 mg/3 mL) pnij 0.6 mg by SubCUTAneous route daily. Indications: type 2 diabetes mellitus 30 Pen 2    glucose blood VI test strips (ONETOUCH ULTRA TEST) strip Check blood sugar in AM ( fasting ) and 2 hours after dinner. 100 Strip 11    Blood-Glucose Meter (ONETOUCH ULTRA2) monitoring kit Check blood sugar in AM ( fasting ) and 2 hours after dinner. 1 Kit 0    lancets (ONETOUCH DELICA LANCETS) 30 gauge misc Check blood sugar in AM ( fasting ) and 2 hours after dinner. 100 Lancet 11        Assessment/ Plan:   Diagnoses and all orders for this visit:      1. Uncontrolled type 2 diabetes mellitus without complication, without long-term current use of insulin ([facility]            I clarified with him again today about his  current medication regiment. He voiced understanding. Provided the AVS with all the medication list he suppose to be on. Sent the pen needle again. Strongly advised him to let me know if there is any issue picking up his med and needles. -     glipiZIDE (GLUCOTROL) 10 mg tablet; TAKE 1 TABLET OP TWICE DAILY. -     Insulin Needles, Disposable, (KYLAH PEN NEEDLE) 32 gauge x 5/32\" ndle; 1 Units by SubCUTAneous route daily.   - metFORMIN (GLUCOPHAGE) 1,000 mg tablet; TAKE 1 TABLET BY MOUTH TWICE DAILY WITH MEALS  -     Liraglutide (VICTOZA) 0.6 mg/0.1 mL (18 mg/3 mL) pnij; 0.6 mg by SubCUTAneous route daily. Indications: type 2 diabetes mellitus    2. Hyperlipidemia with target LDL less than 70  -     atorvastatin (LIPITOR) 20 mg tablet; Take 1 Tab by mouth daily. 3. Non compliance w medication regimen     Spent >75 % time counseling. Medication risks/benefits/costs/interactions/alternatives discussed with patient. Advised patient to call back or return to office if symptoms worsen/change/persist. If patient cannot reach us or should anything more severe/urgent arise he/she should proceed directly to the nearest emergency department. Discussed expected course/resolution/complications of diagnosis in detail with patient. Patient given a written after visit summary which includes her diagnoses, current medications and vitals. Patient expressed understanding with the diagnosis and plan. Follow-up Disposition:  Return in about 1 month (around 7/1/2018). advised to bring all his medication with him in next appointment. Advised to check BS at home and bring records.

## 2018-06-01 NOTE — MR AVS SNAPSHOT
[facility] 
 
 
 Krish[address] [place] [address] 
[phone] Patient: Kiko Feliciano MRN: [id number] :1965 Visit Information Date & Time Provider Department Dept. Phone Encounter #  
 2018  7:45 AM Chadwick Leonid Wallace MD [facility] Internal Medicine [phone] [id number] Follow-up Instructions Return in about 1 month (around 2018). Your Appointments 2018  7:30 AM  
LAB with Sofie Nageotte, MD  
[facility] Internal Medicine [facility]) Appt Note: 3 mo cholesterol check Krishna Calvin  [facility] [address]  
[phone]  
  
   
 [place] [address] Upcoming Health Maintenance Date Due  
 FOOT EXAM Q1 1976 EYE EXAM RETINAL OR DILATED Q1 1976 DTaP/Tdap/Td series (1 - Tdap) 1987 GLAUCOMA SCREENING Q2Y 2016 FOBT Q 1 YEAR AGE 50-75 2016 Influenza Age 5 to Adult 2018 MICROALBUMIN Q1 2018 HEMOGLOBIN A1C Q6M 2018 LIPID PANEL Q1 2019 Allergies as of 2018  Review Complete On: 2018 By: Coco Montano LPN No Known Allergies Current Immunizations  Reviewed on 10/30/2017 No immunizations on file. Not reviewed this visit You Were Diagnosed With   
  
 Codes Comments Uncontrolled type 2 diabetes mellitus without complication, without long-term current use of insulin [facility]     ICD-10-CM: E11.65 ICD-9-CM: 250.02 Hyperlipidemia with target LDL less than 70     ICD-10-CM: E78.5 ICD-9-CM: 272.4 Vitals BP Pulse Temp Resp Height(growth percentile) 120/81 (BP 1 Location: Left arm, BP Patient Position: Sitting) 74 96.3 °F (35.7 °C) (Temporal) 18 5' 7\" (1.702 m) Weight(growth percentile) SpO2 BMI Smoking Status 245 lb (111.1 kg) 96% 38.37 kg/m2 Current Every Day Smoker Vitals History BMI and BSA Data  Body Mass Index Body Surface Area  
 38.37 kg/m 2 2.29 m 2  
  
  
 Preferred Pharmacy Pharmacy Name Phone [facility]astelum [phone] Your Updated Medication List  
  
   
This list is accurate as of 18  7:53 AM.  Always use your most recent med list.  
  
  
  
  
 atorvastatin 20 mg tablet Commonly known as:  LIPITOR Take 1 Tab by mouth daily. Blood-Glucose Meter monitoring kit Commonly known as:  Noemí  Check blood sugar in AM ( fasting ) and 2 hours after dinner. glipiZIDE 10 mg tablet Commonly known as:  GLUCOTROL  
TAKE 1 TABLET OP TWICE DAILY. glucose blood VI test strips strip Commonly known as:  ONETOUCH ULTRA TEST Check blood sugar in AM ( fasting ) and 2 hours after dinner. Insulin Needles (Disposable) 32 gauge x 5/32\" Ndle Commonly known as:  Esther Pen Needle 1 Units by SubCUTAneous route daily. lancets 30 gauge Misc Commonly known as:  Dennys Marie LANCETS Check blood sugar in AM ( fasting ) and 2 hours after dinner. Liraglutide 0.6 mg/0.1 mL (18 mg/3 mL) Pnij Commonly known as:  VICTOZA  
0.6 mg by SubCUTAneous route daily. Indications: type 2 diabetes mellitus  
  
 metFORMIN 1,000 mg tablet Commonly known as:  GLUCOPHAGE  
TAKE 1 TABLET BY MOUTH TWICE DAILY WITH MEALS Prescriptions Sent to Pharmacy Refills  
 glipiZIDE (GLUCOTROL) 10 mg tablet 3 Sig: TAKE 1 TABLET OP TWICE DAILY. Class: Normal  
 Pharmacy: [address] Ph #: [phone] Insulin Needles, Disposable, (ESTHER PEN NEEDLE) 32 gauge x 5/32\" ndle 6 Si Units by SubCUTAneous route daily. Class: Normal  
 Pharmacy: [address] Ph #: [phone]  Route: SubCUTAneous  
 atorvastatin (LIPITOR) 20 mg tablet 3  
 Sig: Take 1 Tab by mouth daily. Class: Normal  
 Pharmacy: "[facility] [address] Ph #: [phone] Route: Oral  
 metFORMIN (GLUCOPHAGE) 1,000 mg tablet 3 Sig: TAKE 1 TABLET BY MOUTH TWICE DAILY WITH MEALS Class: Normal  
 Pharmacy: "[facility] [address] Ph #: [phone] Follow-up Instructions Return in about 1 month (around 7/1/2018). Introducing [facility] SERVICES! Fahad Kendall introduces [employer] patient portal. Now you can access parts of your medical record, email your doctor's office, and request medication refills online. 1. In your internet browser, go to [email or web address] 2. Click on the First Time User? Click Here link in the Sign In box. You will see the New Member Sign Up page. 3. Enter your [employer] Access Code exactly as it appears below. You will not need to use this code after youve completed the sign-up process. If you do not sign up before the expiration date, you must request a new code. · [employer] Access Code: [email or web address] Expires: 7/30/2018  7:42 AM 
 
4. Enter the last four digits of your Social Security Number (xxxx) and Date of Birth (mm/dd/yyyy) as indicated and click Submit. You will be taken to the next sign-up page. 5. Create a K[employer]ek ID. This will be your K[employer] login ID and cannot be changed, so think of one that is secure and easy to remember. 6. Create a K[employer] password. You can change your password at any time. 7. Enter your Password Reset Question and Answer. This can be used at a later time if you forget your password. 8. Enter your e-mail address. You will receive e-mail notification when new information is available in [address] 9. Click Sign Up. You can now view and download portions of your medical record. 10. Click the Download Summary menu link to download a portable copy of your medical information. If you have questions, please visit the Frequently Asked Questions section of the [facility] website. Remember, [facility] is NOT to be used for urgent needs. For medical emergencies, dial 911. Now available from your iPhone and Android! Please provide this summary of care documentation to your next provider. Your primary care clinician is listed as Chadwick Freitas. If you have any questions after today's visit, please call [phone].

## 2018-07-02 ENCOUNTER — OFFICE VISIT (OUTPATIENT)
Dept: INTERNAL MEDICINE CLINIC | Age: 53
End: 2018-07-02

## 2018-07-02 VITALS
DIASTOLIC BLOOD PRESSURE: 85 MMHG | HEART RATE: 74 BPM | HEIGHT: 67 IN | TEMPERATURE: 97.4 F | BODY MASS INDEX: 39.24 KG/M2 | WEIGHT: 250 LBS | SYSTOLIC BLOOD PRESSURE: 130 MMHG | RESPIRATION RATE: 18 BRPM | OXYGEN SATURATION: 97 %

## 2018-07-02 DIAGNOSIS — E66.01 OBESITY, MORBID (HCC): ICD-10-CM

## 2018-07-02 DIAGNOSIS — E78.5 HYPERLIPIDEMIA WITH TARGET LDL LESS THAN 70: ICD-10-CM

## 2018-07-02 LAB — GLUCOSE POC: 253 MG/DL

## 2018-07-02 NOTE — PROGRESS NOTES
Subjective:     Chief Complaint   Patient presents with    Diabetes     1  mo f/u        He  is a 47y.o. year old male with DM, HLD who presents today for one month follow up on blood sugar and medication evaluation. He was started on Victoza for the past was two months but he took the med for only 5 days for the first month. He states that for the past one month he has been taking Victoza and and all other med daily and regularly. He did not check his BS as instructed but he thinks his sugar has been good. Denies any abdominal pain, chest pain, N/V. Reports that his appetite is not great though since he started taking the victoza. He admits that he drinks two cans of soda daily and     Did not have his eye exam yet. Pertinent items are noted in HPI.   Objective:     Vitals:    07/02/18 0803 07/02/18 0827   BP: 125/90 130/85   Pulse: 74    Resp: 18    Temp: 97.4 °F (36.3 °C)    TempSrc: Temporal    SpO2: 97%    Weight: 250 lb (113.4 kg)    Height: 5' 7\" (1.702 m)        Physical Examination: General appearance - alert, well appearing, and in no distress, oriented to person, place, and time and overweight  Mental status - alert, oriented to person, place, and time, normal mood, behavior, speech, dress, motor activity, and thought processes  Chest - clear to auscultation, no wheezes, rales or rhonchi, symmetric air entry  Heart - normal rate, regular rhythm, normal S1, S2, no murmurs, rubs, clicks or gallops  Extremities - no pedal edema noted    No Known Allergies   Social History     Social History    Marital status: SINGLE     Spouse name: N/A    Number of children: N/A    Years of education: N/A     Social History Main Topics    Smoking status: Current Every Day Smoker     Packs/day: 1.00    Smokeless tobacco: Never Used    Alcohol use Yes      Comment: holidays, several timses a yr    Drug use: No    Sexual activity: Yes     Partners: Female     Other Topics Concern    None     Social History Narrative      Family History   Problem Relation Age of Onset    Diabetes Mother     Alcohol abuse Father     No Known Problems Sister     Diabetes Brother     Stroke Brother     No Known Problems Brother     No Known Problems Brother     No Known Problems Sister       History reviewed. No pertinent surgical history. Past Medical History:   Diagnosis Date    Abscess     Diabetes ([address]     Hypercholesterolemia     Sleep apnea       Current Outpatient Prescriptions   Medication Sig Dispense Refill    Liraglutide (VICTOZA) 0.6 mg/0.1 mL (18 mg/3 mL) pnij 1.2 mg by SubCUTAneous route daily. Indications: type 2 diabetes mellitus 3 Pen 2    glipiZIDE (GLUCOTROL) 10 mg tablet TAKE 1 TABLET OP TWICE DAILY. 60 Tab 3    atorvastatin (LIPITOR) 20 mg tablet Take 1 Tab by mouth daily. 30 Tab 3    metFORMIN (GLUCOPHAGE) 1,000 mg tablet TAKE 1 TABLET BY MOUTH TWICE DAILY WITH MEALS 60 Tab 3    Insulin Needles, Disposable, (KYLAH PEN NEEDLE) 32 gauge x 5/32\" ndle 1 Units by SubCUTAneous route daily. 3 Pen Needle 6    glucose blood VI test strips (ONETOUCH ULTRA TEST) strip Check blood sugar in AM ( fasting ) and 2 hours after dinner. 100 Strip 11    Blood-Glucose Meter (ONETOUCH ULTRA2) monitoring kit Check blood sugar in AM ( fasting ) and 2 hours after dinner. 1 Kit 0    lancets (ONETOUCH DELICA LANCETS) 30 gauge misc Check blood sugar in AM ( fasting ) and 2 hours after dinner. 100 Lancet 11        Assessment/ Plan:   Diagnoses and all orders for this visit:    1. Uncontrolled type 2 diabetes mellitus without complication, without long-term current use of insulin (HCC)  -    Random blood sugar in office today is 253. -     AMB POC GLUCOSE BLOOD, BY GLUCOSE MONITORING DEVICE  -   Increase  Liraglutide (VICTOZA) 0.6 mg/0.1 mL (18 mg/3 mL) pnij; 1.2 mg by SubCUTAneous route daily. Indications: type 2 diabetes mellitus        - continue metformin and Glipizide.         -  Discussed about stop drinking soda, sweet beverages, juices on a daily basis. Encouraged to work on diet and exercise.      - discussed yearly eye exam.       - counseling provided   2. Obesity, morbid ([address]      - discussed BMI and goal.  3. Hyperlipidemia with target LDL less than 70      -   Lab Results   Component Value Date/Time    LDL, calculated 117 (H) 05/01/2018 08:09 AM     Continue Lipitor. Spent > 50 % time counseling. Medication risks/benefits/costs/interactions/alternatives discussed with patient. Advised patient to call back or return to office if symptoms worsen/change/persist. If patient cannot reach us or should anything more severe/urgent arise he/she should proceed directly to the nearest emergency department. Discussed expected course/resolution/complications of diagnosis in detail with patient. Patient given a written after visit summary which includes her diagnoses, current medications and vitals. Patient expressed understanding with the diagnosis and plan. Follow-up Disposition:  Return in about 1 month (around 8/2/2018) for Bring diabetic log book. , Cholesterol, blood pressure. Dany Orosco

## 2018-07-02 NOTE — MR AVS SNAPSHOT
[address] 
 
 
 [facility] [address] 
[phone] Patient: Berlin Vergara MRN: [id number] :1965 Visit Information Date & Time Provider Department Dept. Phone Encounter #  
 2018  8:45 AM Bowen Palma MD [facility] Internal Medicine [phone] [id number] Follow-up Instructions Return in about 1 month (around 2018) for Bring diabetic log book. , Cholesterol, blood pressure. Oris Alejandra Your Appointments 2018  8:45 AM  
ROUTINE CARE with Bowen Palma MD  
[facility] Internal Medicine [facility]) Appt Note: follow up  
 [facility] [address]  
[phone]  
  
   
 [place] [address]  
  
    
 2018  7:30 AM  
LAB with Bowen Palma MD  
[facility] Internal Medicine [facility]) Appt Note: 3 mo cholesterol check [facility] [address]  
[phone]  
  
   
 [facility] [address] Upcoming Health Maintenance Date Due  
 FOOT EXAM Q1 1976 EYE EXAM RETINAL OR DILATED Q1 1976 DTaP/Tdap/Td series (1 - Tdap) 1987 GLAUCOMA SCREENING Q2Y 2016 FOBT Q 1 YEAR AGE 50-75 2016 Influenza Age 5 to Adult 2018 MICROALBUMIN Q1 2018 HEMOGLOBIN A1C Q6M 2018 LIPID PANEL Q1 2019 Allergies as of 2018  Review Complete On: 2018 By: Bowen Palma MD  
 No Known Allergies Current Immunizations  Reviewed on 10/30/2017 No immunizations on file. Not reviewed this visit You Were Diagnosed With   
  
 Codes Comments Uncontrolled type 2 diabetes mellitus without complication, without long-term current use of insulin ([facility]    -  Primary ICD-10-CM: E11.65 ICD-9-CM: 250.02 Vitals BP Pulse Temp Resp Height(growth percentile) Weight(growth percentile) 130/85 74 97.4 °F (36.3 °C) (Temporal) 18 5' 7\" (1.702 m) 250 lb (113.4 kg) SpO2 BMI Smoking Status 97% 39.16 kg/m2 Current Every Day Smoker Vitals History BMI and BSA Data Body Mass Index Body Surface Area  
 39.16 kg/m 2 2.32 m 2 Preferred Pharmacy Pharmacy Name Phone [facility] [phone] Your Updated Medication List  
  
   
This list is accurate as of 18  8:32 AM.  Always use your most recent med list.  
  
  
  
  
 atorvastatin 20 mg tablet Commonly known as:  LIPITOR Take 1 Tab by mouth daily. Blood-Glucose Meter monitoring kit Commonly known as:  Scandit Bones Check blood sugar in AM ( fasting ) and 2 hours after dinner. glipiZIDE 10 mg tablet Commonly known as:  GLUCOTROL  
TAKE 1 TABLET OP TWICE DAILY. glucose blood VI test strips strip Commonly known as:  ONETOUCH ULTRA TEST Check blood sugar in AM ( fasting ) and 2 hours after dinner. Insulin Needles (Disposable) 32 gauge x /32\" Ndle Commonly known as:  Esther Pen Needle 1 Units by SubCUTAneous route daily. lancets 30 gauge Misc Commonly known as:  Natalia Gloss LANCETS Check blood sugar in AM ( fasting ) and 2 hours after dinner. Liraglutide 0.6 mg/0.1 mL (18 mg/3 mL) Pnij Commonly known as:  VICTOZA  
1.2 mg by SubCUTAneous route daily. Indications: type 2 diabetes mellitus  
  
 metFORMIN 1,000 mg tablet Commonly known as:  GLUCOPHAGE  
TAKE 1 TABLET BY MOUTH TWICE DAILY WITH MEALS Prescriptions Sent to Pharmacy Refills Liraglutide (VICTOZA) 0.6 mg/0.1 mL (18 mg/3 mL) pnij 2 Si.2 mg by SubCUTAneous route daily. Indications: type 2 diabetes mellitus Class: Normal  
 Pharmacy: [facility]  #: [phone] Route: SubCUTAneous We Performed the Following AMB POC GLUCOSE BLOOD, BY GLUCOSE MONITORING DEVICE [79398 CPT(R)] Follow-up Instructions Return in about 1 month (around 8/2/2018) for Bring diabetic log book. , Cholesterol, blood pressure. Paulina Schmidt Introducing [facility] HEALTH SERVICES! [employer] introduces [employer] patient portal. Now you can access parts of your medical record, email your doctor's office, and request medication refills online. 1. In your internet browser, go to [email or web address] 2. Click on the First Time User? Click Here link in the Sign In box. You will see the New Member Sign Up page. 3. Enter your [employer] Access Code exactly as it appears below. You will not need to use this code after youve completed the sign-up process. If you do not sign up before the expiration date, you must request a new code. · [employer] Access Code: [email or web address] Expires: 7/30/2018  7:42 AM 
 
4. Enter the last four digits of your Social Security Number (xxxx) and Date of Birth (mm/dd/yyyy) as indicated and click Submit. You will be taken to the next sign-up page. 5. Create a [employer] ID. This will be your [employer] login ID and cannot be changed, so think of one that is secure and easy to remember. 6. Create a [employer] password. You can change your password at any time. 7. Enter your Password Reset Question and Answer. This can be used at a later time if you forget your password. 8. Enter your e-mail address. You will receive e-mail notification when new information is available in [address] 9. Click Sign Up. You can now view and download portions of your medical record. 10. Click the Download Summary menu link to download a portable copy of your medical information. If you have questions, please visit the Frequently Asked Questions section of the [employer] website. Remember, [employer] is NOT to be used for urgent needs. For medical emergencies, dial 911. Now available from your iPhone and Android! Please provide this summary of care documentation to your next provider. Your primary care clinician is listed as Chadwick Freitas. If you have any questions after today's visit, please call [phone].

## 2018-08-02 ENCOUNTER — OFFICE VISIT (OUTPATIENT)
Dept: INTERNAL MEDICINE CLINIC | Age: 53
End: 2018-08-02

## 2018-08-02 VITALS
HEART RATE: 85 BPM | SYSTOLIC BLOOD PRESSURE: 120 MMHG | RESPIRATION RATE: 18 BRPM | DIASTOLIC BLOOD PRESSURE: 85 MMHG | BODY MASS INDEX: 38.92 KG/M2 | WEIGHT: 248 LBS | OXYGEN SATURATION: 99 % | TEMPERATURE: 97.5 F | HEIGHT: 67 IN

## 2018-08-02 DIAGNOSIS — E11.9 TYPE 2 DIABETES MELLITUS WITHOUT COMPLICATION, WITHOUT LONG-TERM CURRENT USE OF INSULIN (HCC): Primary | ICD-10-CM

## 2018-08-02 DIAGNOSIS — Z12.11 COLON CANCER SCREENING: ICD-10-CM

## 2018-08-02 DIAGNOSIS — E78.5 HYPERLIPIDEMIA WITH TARGET LDL LESS THAN 70: ICD-10-CM

## 2018-08-02 DIAGNOSIS — F17.200 SMOKING: ICD-10-CM

## 2018-08-02 DIAGNOSIS — E66.01 OBESITY, MORBID (HCC): ICD-10-CM

## 2018-08-02 LAB — HBA1C MFR BLD HPLC: 10.8 %

## 2018-08-02 NOTE — MR AVS SNAPSHOT
[facility] [address] 
[phone] Patient: Aziza Castellano MRN: [id number] :[id number] Visit Information Date & Time Provider Department Dept. Phone Encounter #  
 2018  7:45 AM Chadwick Heath MD [facility] Internal Medicine [phone] [id number] Follow-up Instructions Return in about 3 months (around 2018) for Bring diabetic log book. Carlitos Vanegasos Upcoming Health Maintenance Date Due  
 FOOT EXAM Q1 1976 EYE EXAM RETINAL OR DILATED Q1 1976 DTaP/Tdap/Td series (1 - Tdap) 1987 GLAUCOMA SCREENING Q2Y 2016 FOBT Q 1 YEAR AGE 50-75 2016 Influenza Age 5 to Adult 2018 MICROALBUMIN Q1 2018 HEMOGLOBIN A1C Q6M 2018 LIPID PANEL Q1 2019 Allergies as of 2018  Review Complete On: 2018 By: Mary Ocampo MD  
 No Known Allergies Current Immunizations  Reviewed on 10/30/2017 No immunizations on file. Not reviewed this visit You Were Diagnosed With   
  
 Codes Comments Type 2 diabetes mellitus without complication, without long-term current use of insulin (HCC)    -  Primary ICD-10-CM: E11.9 ICD-9-CM: 250.00 Hyperlipidemia with target LDL less than 70     ICD-10-CM: E78.5 ICD-9-CM: 272.4 Colon cancer screening     ICD-10-CM: Z12.11 ICD-9-CM: V76.51 Vitals BP Pulse Temp Resp Height(growth percentile) Weight(growth percentile) 120/85 85 97.5 °F (36.4 °C) (Temporal) 18 5' 7\" (1.702 m) 248 lb (112.5 kg) SpO2 BMI Smoking Status 99% 38.84 kg/m2 Current Every Day Smoker Vitals History BMI and BSA Data Body Mass Index Body Surface Area  
 38.84 kg/m 2 2.31 m 2 Preferred Pharmacy Pharmacy Name Phone [address] [phone] Your Updated Medication List  
  
   
 This list is accurate as of 8/2/18  8:28 AM.  Always use your most recent med list.  
  
  
  
  
 atorvastatin 20 mg tablet Commonly known as:  LIPITOR Take 1 Tab by mouth daily. Blood-Glucose Meter monitoring kit Commonly known as:  Steven Fowler Check blood sugar in AM ( fasting ) and 2 hours after dinner. glipiZIDE 10 mg tablet Commonly known as:  GLUCOTROL  
TAKE 1 TABLET OP TWICE DAILY. glucose blood VI test strips strip Commonly known as:  ONETOUCH ULTRA TEST Check blood sugar in AM ( fasting ) and 2 hours after dinner. Insulin Needles (Disposable) 32 gauge x 5/32\" Ndle Commonly known as:  Esther Pen Needle 1 Units by SubCUTAneous route daily. lancets 30 gauge Misc Commonly known as:  Alondra Round LANCETS Check blood sugar in AM ( fasting ) and 2 hours after dinner. Liraglutide 0.6 mg/0.1 mL (18 mg/3 mL) Pnij Commonly known as:  VICTOZA  
1.2 mg by SubCUTAneous route daily. Indications: type 2 diabetes mellitus  
  
 metFORMIN 1,000 mg tablet Commonly known as:  GLUCOPHAGE  
TAKE 1 TABLET BY MOUTH TWICE DAILY WITH MEALS We Performed the Following AMB POC HEMOGLOBIN A1C [16128 CPT(R)] LIPID PANEL [89013 CPT(R)] METABOLIC PANEL, COMPREHENSIVE [68303 CPT(R)] OCCULT BLOOD IMMUNOASSAY,DIAGNOSTIC [06998 CPT(R)] Follow-up Instructions Return in about 3 months (around 11/2/2018) for Bring diabetic log book. Junito Johnson Introducing [facility]! Ruthann Epstein introduces [employer] patient portal. Now you can access parts of your medical record, email your doctor's office, and request medication refills online. 1. In your internet browser, go to [email or web address] 2. Click on the First Time User? Click Here link in the Sign In box. You will see the New Member Sign Up page. 3. Enter your [employer] Access Code exactly as it appears below.  You will not need to use this code after youve completed the sign-up process. If you do not sign up before the expiration date, you must request a new code. · [employer] Access Code: [email or web address] Expires: 10/31/2018  7:45 AM 
 
4. Enter the last four digits of your Social Security Number (xxxx) and Date of Birth (mm/dd/yyyy) as indicated and click Submit. You will be taken to the next sign-up page. 5. Create a [employer] ID. This will be your [employer] login ID and cannot be changed, so think of one that is secure and easy to remember. 6. Create a [employer] password. You can change your password at any time. 7. Enter your Password Reset Question and Answer. This can be used at a later time if you forget your password. 8. Enter your e-mail address. You will receive e-mail notification when new information is available in [address] 9. Click Sign Up. You can now view and download portions of your medical record. 10. Click the Download Summary menu link to download a portable copy of your medical information. If you have questions, please visit the Frequently Asked Questions section of the [employer] website. Remember, [employer] is NOT to be used for urgent needs. For medical emergencies, dial 911. Now available from your iPhone and Android! Please provide this summary of care documentation to your next provider. Your primary care clinician is listed as Chadwick Freitas. If you have any questions after today's visit, please call [phone].

## 2018-08-02 NOTE — PROGRESS NOTES
Subjective:     Chief Complaint   Patient presents with    Diabetes     follow up. He  is a 47y.o. year old male who presents with his wife today for follow up of his chronic condition. DM-2: last A1c was 12.3. He was started on Victoza three months ago  but he took the med for only 5 days for the first month and then from last month he started taking daily. He does not check his BS as instructed but he thinks his sugar has been good. He is suppose to take metformin and Glipizide BID but instead he is only taking once/day. States that he usually forget to take the night dose.    Denies any abdominal pain, chest pain, N/V. Reports that his appetite is not great though since he started taking the victoza. HLD: he is Lipitor 20 mg daily.    He admits that he drinks two cans of soda daily and he eats lot of carb. No exercise but try to be active at work. Continue to smoke. No plan on quitting .      Did not have his eye exam yet. He denies any chest pain, soa, cough, abdominal pain, leg pain. Pertinent items are noted in HPI.   Objective:     Vitals:    08/02/18 0747 08/02/18 0818   BP: (!) 131/95 120/85   Pulse: 85    Resp: 18    Temp: 97.5 °F (36.4 °C)    TempSrc: Temporal    SpO2: 99%    Weight: 248 lb (112.5 kg)    Height: 5' 7\" (1.702 m)        Physical Examination: General appearance - alert, well appearing, and in no distress, oriented to person, place, and time and overweight  Mental status - alert, oriented to person, place, and time, normal mood, behavior, speech, dress, motor activity, and thought processes  Eyes - pupils equal and reactive, extraocular eye movements intact  Chest - clear to auscultation, no wheezes, rales or rhonchi, symmetric air entry  Heart - normal rate, regular rhythm, normal S1, S2, no murmurs, rubs, clicks or gallops  Neurological - alert, oriented, normal speech, no focal findings or movement disorder noted  Extremities - peripheral pulses normal, no pedal edema, no clubbing or cyanosis, no pedal edema noted, feet normal, good pulses, normal color, temperature and sensation, monofilament sensory exam is normal in both feet    No Known Allergies   Social History     Social History    Marital status: SINGLE     Spouse name: N/A    Number of children: N/A    Years of education: N/A     Social History Main Topics    Smoking status: Current Every Day Smoker     Packs/day: 1.00    Smokeless tobacco: Never Used    Alcohol use Yes      Comment: holidays, several timses a yr    Drug use: No    Sexual activity: Yes     Partners: Female     Other Topics Concern    None     Social History Narrative      Family History   Problem Relation Age of Onset    Diabetes Mother     Alcohol abuse Father     No Known Problems Sister     Diabetes Brother     Stroke Brother     No Known Problems Brother     No Known Problems Brother     No Known Problems Sister       History reviewed. No pertinent surgical history. Past Medical History:   Diagnosis Date    Abscess     Diabetes ([facility] [address]     Hypercholesterolemia     Sleep apnea       Current Outpatient Prescriptions   Medication Sig Dispense Refill    glipiZIDE (GLUCOTROL) 10 mg tablet TAKE 1 TABLET OP TWICE DAILY. 60 Tab 3    metFORMIN (GLUCOPHAGE) 1,000 mg tablet TAKE 1 TABLET BY MOUTH TWICE DAILY WITH MEALS 60 Tab 3    Liraglutide (VICTOZA) 0.6 mg/0.1 mL (18 mg/3 mL) pnij 1.2 mg by SubCUTAneous route daily. Indications: type 2 diabetes mellitus 3 Pen 2    Insulin Needles, Disposable, (KYLAH PEN NEEDLE) 32 gauge x 5/32\" ndle 1 Units by SubCUTAneous route daily. 3 Pen Needle 6    atorvastatin (LIPITOR) 20 mg tablet Take 1 Tab by mouth daily. 30 Tab 3    glucose blood VI test strips (ONETOUCH ULTRA TEST) strip Check blood sugar in AM ( fasting ) and 2 hours after dinner. 100 Strip 11    Blood-Glucose Meter (ONETOUCH ULTRA2) monitoring kit Check blood sugar in AM ( fasting ) and 2 hours after dinner.  1 Kit 0    lancets (NORAH KAYE LANCETS) 30 gauge misc Check blood sugar in AM ( fasting ) and 2 hours after dinner. 100 Lancet 11        Assessment/ Plan:   Diagnoses and all orders for this visit:    1. Type 2 diabetes mellitus without complication, without long-term current use of insulin (HCC)  -     AMB POC HEMOGLOBIN A1C is 10.8, improved from last time. -    Strongly advised to take med as directed. -    Counseled on med compliance. -    continue Victoza. -     METABOLIC PANEL, COMPREHENSIVE  -      DIABETES FOOT EXAM    2. Hyperlipidemia with target LDL less than 70  -     Last LDL was 117. Continue current med . Will change med according to the result. -    METABOLIC PANEL, COMPREHENSIVE  -     LIPID PANEL        -  Discussed about stop drinking soda, sweet beverages, avoid  juices on a daily basis. Encouraged to work on diet and exercise.      - discussed yearly eye exam.         3. Obesity, morbid ([address]       -    Discussed diabetic diet, regular exercise. 4. Smoking       - encouraged to quit smoking. 5. Colon cancer screening  -     OCCULT BLOOD IMMUNOASSAY,DIAGNOSTIC           Medication risks/benefits/costs/interactions/alternatives discussed with patient. Advised patient to call back or return to office if symptoms worsen/change/persist. If patient cannot reach us or should anything more severe/urgent arise he/she should proceed directly to the nearest emergency department. Discussed expected course/resolution/complications of diagnosis in detail with patient. Patient given a written after visit summary which includes her diagnoses, current medications and vitals. Patient expressed understanding with the diagnosis and plan. Follow-up Disposition:  Return in about 3 months (around 11/2/2018) for Bring diabetic log book. Joan Zuniga

## 2018-08-03 LAB
ALBUMIN SERPL-MCNC: 4.2 G/DL (ref 3.5–5.5)
ALBUMIN/GLOB SERPL: 1.4 {RATIO} (ref 1.2–2.2)
ALP SERPL-CCNC: 96 IU/L (ref 39–117)
ALT SERPL-CCNC: 19 IU/L (ref 0–44)
AST SERPL-CCNC: 16 IU/L (ref 0–40)
BILIRUB SERPL-MCNC: 0.4 MG/DL (ref 0–1.2)
BUN SERPL-MCNC: 9 MG/DL (ref 6–24)
BUN/CREAT SERPL: 11 (ref 9–20)
CALCIUM SERPL-MCNC: 9.6 MG/DL (ref 8.7–10.2)
CHLORIDE SERPL-SCNC: 98 MMOL/L (ref 96–106)
CHOLEST SERPL-MCNC: 211 MG/DL (ref 100–199)
CO2 SERPL-SCNC: 21 MMOL/L (ref 20–29)
CREAT SERPL-MCNC: 0.79 MG/DL (ref 0.76–1.27)
GLOBULIN SER CALC-MCNC: 3 G/DL (ref 1.5–4.5)
GLUCOSE SERPL-MCNC: 211 MG/DL (ref 65–99)
HDLC SERPL-MCNC: 49 MG/DL
INTERPRETATION, 910389: NORMAL
LDLC SERPL CALC-MCNC: 144 MG/DL (ref 0–99)
Lab: NORMAL
POTASSIUM SERPL-SCNC: 4.8 MMOL/L (ref 3.5–5.2)
PROT SERPL-MCNC: 7.2 G/DL (ref 6–8.5)
SODIUM SERPL-SCNC: 137 MMOL/L (ref 134–144)
TRIGL SERPL-MCNC: 89 MG/DL (ref 0–149)
VLDLC SERPL CALC-MCNC: 18 MG/DL (ref 5–40)

## 2018-08-03 NOTE — PROGRESS NOTES
Please call patient:    1. Cholesterol level is higher than last time. Make sure to take his cholesterol med daily and work on diet and exercise as we have discussed. Follow up in three months.      2. Kidney and liver function is normal.

## 2019-11-12 ENCOUNTER — OFFICE VISIT (OUTPATIENT)
Dept: PRIMARY CARE CLINIC | Age: 54
End: 2019-11-12

## 2019-11-12 VITALS
HEIGHT: 67 IN | HEART RATE: 92 BPM | SYSTOLIC BLOOD PRESSURE: 107 MMHG | DIASTOLIC BLOOD PRESSURE: 70 MMHG | TEMPERATURE: 98.2 F | WEIGHT: 230 LBS | OXYGEN SATURATION: 98 % | RESPIRATION RATE: 17 BRPM | BODY MASS INDEX: 36.1 KG/M2

## 2019-11-12 DIAGNOSIS — Z91.14 NON COMPLIANCE W MEDICATION REGIMEN: ICD-10-CM

## 2019-11-12 DIAGNOSIS — E78.5 HYPERLIPIDEMIA WITH TARGET LDL LESS THAN 70: ICD-10-CM

## 2019-11-12 LAB — HBA1C MFR BLD HPLC: >15 %

## 2019-11-12 RX ORDER — PEN NEEDLE, DIABETIC 30 GX3/16"
NEEDLE, DISPOSABLE MISCELLANEOUS
Qty: 1 PACKAGE | Refills: 11 | Status: SHIPPED | OUTPATIENT
Start: 2019-11-12 | End: 2020-12-30 | Stop reason: SDUPTHER

## 2019-11-12 RX ORDER — ATORVASTATIN CALCIUM 20 MG/1
20 TABLET, FILM COATED ORAL DAILY
Qty: 30 TAB | Refills: 2 | Status: SHIPPED | OUTPATIENT
Start: 2019-11-12 | End: 2019-12-30 | Stop reason: SDUPTHER

## 2019-11-12 RX ORDER — GLIPIZIDE 10 MG/1
TABLET ORAL
Qty: 60 TAB | Refills: 2 | Status: SHIPPED | OUTPATIENT
Start: 2019-11-12 | End: 2019-12-30 | Stop reason: SDUPTHER

## 2019-11-12 RX ORDER — METFORMIN HYDROCHLORIDE 1000 MG/1
TABLET ORAL
Qty: 60 TAB | Refills: 2 | Status: SHIPPED | OUTPATIENT
Start: 2019-11-12 | End: 2019-12-30 | Stop reason: SDUPTHER

## 2019-11-12 NOTE — PROGRESS NOTES
Subjective:     Chief Complaint   Patient presents with    Diabetes    Cholesterol Problem        He  is a 49y.o. year old male with hx of DM-2. HLD is here after more than a year for follow up on diabetes. He is here with his wife. He is suppose to be on Victoza, Glipizide , metformin and Lipitor but he is not taking any med for the past 2 months. States that he was very busy at work that's why he did not get time to follow up. Patient denies any chest pain, soa, cough, abdominal pain. Last A1c was 10.8. Did not have eye exam.   Denies any chest pain, soa, blurry vision, urinary symptoms, headache. Pertinent items are noted in HPI.   Objective:     Vitals:    11/12/19 0759   BP: 107/70   Pulse: 92   Resp: 17   Temp: 98.2 °F (36.8 °C)   TempSrc: Oral   SpO2: 98%   Weight: 230 lb (104.3 kg)   Height: 5' 7\" (1.702 m)       Physical Examination: General appearance - alert, well appearing, and in no distress, oriented to person, place, and time and overweight  Mental status - alert, oriented to person, place, and time, normal mood, behavior, speech, dress, motor activity, and thought processes  Chest - clear to auscultation, no wheezes, rales or rhonchi, symmetric air entry  Heart - normal rate, regular rhythm, normal S1, S2, no murmurs, rubs, clicks or gallops    No Known Allergies   Social History     Socioeconomic History    Marital status: SINGLE     Spouse name: Not on file    Number of children: Not on file    Years of education: Not on file    Highest education level: Not on file   Tobacco Use    Smoking status: Current Every Day Smoker     Packs/day: 1.00    Smokeless tobacco: Never Used   Substance and Sexual Activity    Alcohol use: Yes     Comment: holidays, several timses a yr    Drug use: Never    Sexual activity: Yes     Partners: Female      Family History   Problem Relation Age of Onset    Diabetes Mother     Alcohol abuse Father     No Known Problems Sister     Diabetes Brother  Stroke Brother     No Known Problems Brother     No Known Problems Brother     No Known Problems Sister       History reviewed. No pertinent surgical history. Past Medical History:   Diagnosis Date    Abscess     Diabetes ([address]     Hypercholesterolemia     Sleep apnea       Current Outpatient Medications   Medication Sig Dispense Refill    atorvastatin (LIPITOR) 20 mg tablet Take 1 Tab by mouth daily. Need appointment for next refill 15 Tab 0    Liraglutide (VICTOZA) 0.6 mg/0.1 mL (18 mg/3 mL) pnij 1.2 mg by SubCUTAneous route daily. Indications: type 2 diabetes mellitus 3 Pen 2    glipiZIDE (GLUCOTROL) 10 mg tablet TAKE 1 TABLET OP TWICE DAILY. 60 Tab 3    Insulin Needles, Disposable, (KYLAH PEN NEEDLE) 32 gauge x 5/32\" ndle 1 Units by SubCUTAneous route daily. 3 Pen Needle 6    metFORMIN (GLUCOPHAGE) 1,000 mg tablet TAKE 1 TABLET BY MOUTH TWICE DAILY WITH MEALS 60 Tab 3    glucose blood VI test strips (ONETOUCH ULTRA TEST) strip Check blood sugar in AM ( fasting ) and 2 hours after dinner. 100 Strip 11    Blood-Glucose Meter (ONETOUCH ULTRA2) monitoring kit Check blood sugar in AM ( fasting ) and 2 hours after dinner. 1 Kit 0    lancets (ONETOUCH DELICA LANCETS) 30 gauge misc Check blood sugar in AM ( fasting ) and 2 hours after dinner. 100 Lancet 11        Assessment/ Plan:   Diagnoses and all orders for this visit:    1. Uncontrolled type 2 diabetes mellitus without complication, without long-term current use of insulin (HCC)  -     AMB POC HEMOGLOBIN A1C is >15. Counseling provided . Discussed about side effects of uncontrolled DM such as MI, stroke, blindness, kidney failure. Pt refused to continue Victoza if he starts on Insulin.   -    Start  insulin detemir U-100 (LEVEMIR FLEXTOUCH) 100 unit/mL (3 mL) inpn; 30 Units by SubCUTAneous route nightly. -    Restart glipiZIDE (GLUCOTROL) 10 mg tablet; TAKE 1 TABLET OP TWICE DAILY.   -     Restart metFORMIN (GLUCOPHAGE) 1,000 mg tablet; TAKE 1 TABLET BY MOUTH TWICE DAILY WITH MEALS  -     METABOLIC PANEL, COMPREHENSIVE  -     MICROALBUMIN, UR, RAND W/ MICROALB/CREAT RATIO  -     Insulin Needles, Disposable, 31 gauge x 5/16\" ndle; Use once/day              Advised to check BS at least two times/day and call me with the result on Friday. 2. Hyperlipidemia with target LDL less than 70  -     LIPID PANEL  -     METABOLIC PANEL, COMPREHENSIVE  -    Restart  atorvastatin (LIPITOR) 20 mg tablet; Take 1 Tab by mouth daily. Need appointment for next refill    3. Non compliance w medication regimen       - counseling provided. Counseled on diet, exercise. Medication risks/benefits/costs/interactions/alternatives discussed with patient. Advised patient to call back or return to office if symptoms worsen/change/persist. If patient cannot reach us or should anything more severe/urgent arise he/she should proceed directly to the nearest emergency department. Discussed expected course/resolution/complications of diagnosis in detail with patient. Patient given a written after visit summary which includes her diagnoses, current medications and vitals. Patient expressed understanding with the diagnosis and plan. Follow-up and Dispositions    · Return in about 6 weeks (around 12/24/2019) for Bring diabetic log book. call me with BS number in two days. Kelsey Ask

## 2019-11-13 ENCOUNTER — TELEPHONE (OUTPATIENT)
Dept: PRIMARY CARE CLINIC | Age: 54
End: 2019-11-13

## 2019-11-13 LAB
ALBUMIN SERPL-MCNC: 4.2 G/DL (ref 3.5–5.5)
ALBUMIN/CREAT UR: <8.8 MG/G CREAT (ref 0–30)
ALBUMIN/GLOB SERPL: 1.4 {RATIO} (ref 1.2–2.2)
ALP SERPL-CCNC: 128 IU/L (ref 39–117)
ALT SERPL-CCNC: 16 IU/L (ref 0–44)
AST SERPL-CCNC: 11 IU/L (ref 0–40)
BILIRUB SERPL-MCNC: 0.3 MG/DL (ref 0–1.2)
BUN SERPL-MCNC: 8 MG/DL (ref 6–24)
BUN/CREAT SERPL: 8 (ref 9–20)
CALCIUM SERPL-MCNC: 10 MG/DL (ref 8.7–10.2)
CHLORIDE SERPL-SCNC: 97 MMOL/L (ref 96–106)
CHOLEST SERPL-MCNC: 220 MG/DL (ref 100–199)
CO2 SERPL-SCNC: 28 MMOL/L (ref 20–29)
CREAT SERPL-MCNC: 0.99 MG/DL (ref 0.76–1.27)
CREAT UR-MCNC: 34.1 MG/DL
GLOBULIN SER CALC-MCNC: 3 G/DL (ref 1.5–4.5)
GLUCOSE SERPL-MCNC: 508 MG/DL (ref 65–99)
HDLC SERPL-MCNC: 47 MG/DL
LDLC SERPL CALC-MCNC: 150 MG/DL (ref 0–99)
MICROALBUMIN UR-MCNC: <3 UG/ML
POTASSIUM SERPL-SCNC: 5.5 MMOL/L (ref 3.5–5.2)
PROT SERPL-MCNC: 7.2 G/DL (ref 6–8.5)
SODIUM SERPL-SCNC: 137 MMOL/L (ref 134–144)
TRIGL SERPL-MCNC: 117 MG/DL (ref 0–149)
VLDLC SERPL CALC-MCNC: 23 MG/DL (ref 5–40)

## 2019-11-13 RX ORDER — INSULIN PUMP SYRINGE, 3 ML
EACH MISCELLANEOUS
Qty: 1 KIT | Refills: 0 | Status: SHIPPED | OUTPATIENT
Start: 2019-11-13 | End: 2022-03-23

## 2019-11-13 RX ORDER — BLOOD-GLUCOSE CONTROL, NORMAL
EACH MISCELLANEOUS
Qty: 100 LANCET | Refills: 11 | Status: SHIPPED | OUTPATIENT
Start: 2019-11-13

## 2019-11-13 NOTE — PROGRESS NOTES
Please call patient( patient was notified about the BS level already). 1. Cholesterol level is high as expected since you did not take the Atorvastatin for few months. Please make sure to to take the med advised. 2. Please make sure to take the insulin as directed. 3. Potassium level is elevated likely due to his very high sugar level. Make sure to drink plenty of water. Call with the BS number and follow up as advised. Any signs of altered mental status, sweating, abdominal pain, N/V please go to ER ASAP.

## 2019-11-13 NOTE — TELEPHONE ENCOUNTER
----- Message from Celso Stein MD sent at 11/13/2019  1:11 PM EST -----  Please call patient( patient was notified about the BS level already). 1. Cholesterol level is high as expected since you did not take the Atorvastatin for few months. Please make sure to to take the med advised. 2. Please make sure to take the insulin as directed. 3. Potassium level is elevated likely due to his very high sugar level. Make sure to drink plenty of water. Call with the BS number and follow up as advised. Any signs of altered mental status, sweating, abdominal pain, N/V please go to ER ASAP.

## 2019-11-13 NOTE — TELEPHONE ENCOUNTER
Patient states that he has started medication but he needs a new meter, strips, and lancets sent to the pharmacy.

## 2019-11-13 NOTE — TELEPHONE ENCOUNTER
Please call patient and notify about the sugar level and verify whether he started taking the med or not. He needs to call me with BS number on Friday or sooner if needed.

## 2019-11-13 NOTE — TELEPHONE ENCOUNTER
[facility] called with a critical lab result:    Glucose verified by repeat    Glucose lever 508 [phone]  Opt 1

## 2019-11-15 ENCOUNTER — TELEPHONE (OUTPATIENT)
Dept: PRIMARY CARE CLINIC | Age: 54
End: 2019-11-15

## 2019-11-15 NOTE — TELEPHONE ENCOUNTER
Pt gave a call back. Also stated that he was instructed by his provider to call back Friday. He requested a call back.

## 2019-11-15 NOTE — TELEPHONE ENCOUNTER
Pt states that he can not figure out how to look back at previous dates on his meter. But states that today, his fasting blood sugar was 172 and after lunch it was 302. Pt states that since starting medications, blood sugars have not been higher than 437 and that was the beginning of the week. Now they are in the higher 200s. Advised to continue to monitor blood sugars daily and take medications as prescribed. Also advised that he keeps a logbook of his sugars. Pt verbalized his understanding.

## 2019-12-30 ENCOUNTER — OFFICE VISIT (OUTPATIENT)
Dept: PRIMARY CARE CLINIC | Age: 54
End: 2019-12-30

## 2019-12-30 VITALS
RESPIRATION RATE: 16 BRPM | OXYGEN SATURATION: 98 % | WEIGHT: 239.4 LBS | SYSTOLIC BLOOD PRESSURE: 130 MMHG | DIASTOLIC BLOOD PRESSURE: 80 MMHG | TEMPERATURE: 98.1 F | HEIGHT: 67 IN | HEART RATE: 85 BPM | BODY MASS INDEX: 37.57 KG/M2

## 2019-12-30 DIAGNOSIS — E78.5 HYPERLIPIDEMIA WITH TARGET LDL LESS THAN 70: ICD-10-CM

## 2019-12-30 DIAGNOSIS — Z91.14 NON COMPLIANCE W MEDICATION REGIMEN: ICD-10-CM

## 2019-12-30 LAB — GLUCOSE POC: 325 MG/DL

## 2019-12-30 RX ORDER — ATORVASTATIN CALCIUM 20 MG/1
20 TABLET, FILM COATED ORAL DAILY
Qty: 30 TAB | Refills: 2 | Status: SHIPPED | OUTPATIENT
Start: 2019-12-30 | End: 2020-09-30 | Stop reason: SDUPTHER

## 2019-12-30 RX ORDER — GLIPIZIDE 10 MG/1
TABLET ORAL
Qty: 60 TAB | Refills: 2 | Status: SHIPPED | OUTPATIENT
Start: 2019-12-30 | End: 2020-08-09

## 2019-12-30 RX ORDER — METFORMIN HYDROCHLORIDE 1000 MG/1
TABLET ORAL
Qty: 60 TAB | Refills: 2 | Status: SHIPPED | OUTPATIENT
Start: 2019-12-30 | End: 2020-02-11 | Stop reason: DRUGHIGH

## 2019-12-30 NOTE — PROGRESS NOTES
Chief Complaint   Patient presents with    Diabetes     6 wk follow up   1. Have you been to the ER, urgent care clinic since your last visit? Hospitalized since your last visit? No    2. Have you seen or consulted any other health care providers outside of the [address] [place] [address] since your last visit? Include any pap smears or colon screening.  No

## 2019-12-30 NOTE — PROGRESS NOTES
Subjective:     Chief Complaint   Patient presents with    Diabetes     6 wk follow up        He  is a 49y.o. year old male with hx of DM-2. HLD is here  for follow up on diabetes. He was restarted all his med about 6 weeks ago after he stopped taking the meds for a year. He is here with his wife. Patient reports that he did not take any oral meds for the past two weeks because he was busy to  his meds. States that he is compliant with Levemir which was started 6 weeks ago. Has been checking his sugar level sporadically in the morning. Its been running in upper 200 ranges. This morning he drank soda before he came to office. He is not following diabetic diet nor does exercise. Last A1c was >15.     Did not have eye exam yet as advised. Denies any chest pain, soa, blurry vision, urinary symptoms, headache.        Pertinent items are noted in HPI.   Objective:     Vitals:    12/30/19 0753 12/30/19 0817   BP: 153/81 130/80   Pulse: 85    Resp: 16    Temp: 98.1 °F (36.7 °C)    TempSrc: Oral    SpO2: 98%    Weight: 239 lb 6.4 oz (108.6 kg)    Height: 5' 7\" (1.702 m)        Physical Examination: General appearance - alert, well appearing, and in no distress, oriented to person, place, and time and overweight  Mental status - alert, oriented to person, place, and time, normal mood, behavior, speech, dress, motor activity, and thought processes  Chest - clear to auscultation, no wheezes, rales or rhonchi, symmetric air entry  Heart - normal rate, regular rhythm, normal S1, S2, no murmurs, rubs, clicks or gallops    No Known Allergies   Social History     Socioeconomic History    Marital status: SINGLE     Spouse name: Not on file    Number of children: Not on file    Years of education: Not on file    Highest education level: Not on file   Tobacco Use    Smoking status: Current Every Day Smoker     Packs/day: 1.00    Smokeless tobacco: Never Used   Substance and Sexual Activity    Alcohol use: Yes     Comment: holidays, several timses a yr    Drug use: Never    Sexual activity: Yes     Partners: Female      Family History   Problem Relation Age of Onset    Diabetes Mother     Alcohol abuse Father     No Known Problems Sister     Diabetes Brother     Stroke Brother     No Known Problems Brother     No Known Problems Brother     No Known Problems Sister       No past surgical history on file. Past Medical History:   Diagnosis Date    Abscess     Diabetes ([address]     Hypercholesterolemia     Sleep apnea       Current Outpatient Medications   Medication Sig Dispense Refill    lancets (ONETOUCH DELICA LANCETS) 30 gauge misc Check blood sugar in AM ( fasting ) and 2 hours after dinner. 100 Lancet 11    Blood-Glucose Meter (ONETOUCH ULTRA2 METER) monitoring kit Check blood sugar in AM ( fasting ) and 2 hours after dinner. 1 Kit 0    glucose blood VI test strips (ONETOUCH ULTRA TEST) strip Check blood sugar in AM ( fasting ) and 2 hours after dinner. 100 Strip 11    insulin detemir U-100 (LEVEMIR FLEXTOUCH) 100 unit/mL (3 mL) inpn 30 Units by SubCUTAneous route nightly. 5 Adjustable Dose Pre-filled Pen Syringe 1    glipiZIDE (GLUCOTROL) 10 mg tablet TAKE 1 TABLET OP TWICE DAILY. 60 Tab 2    metFORMIN (GLUCOPHAGE) 1,000 mg tablet TAKE 1 TABLET BY MOUTH TWICE DAILY WITH MEALS 60 Tab 2    Insulin Needles, Disposable, 31 gauge x 5/16\" ndle Use once/day 1 Package 11    atorvastatin (LIPITOR) 20 mg tablet Take 1 Tab by mouth daily. Need appointment for next refill 30 Tab 2    Insulin Needles, Disposable, (KYLAH PEN NEEDLE) 32 gauge x 5/32\" ndle 1 Units by SubCUTAneous route daily. 3 Pen Needle 6        Assessment/ Plan:   Diagnoses and all orders for this visit:    1. Uncontrolled type 2 diabetes mellitus without complication, without long-term current use of insulin (HCC)  -     AMB POC GLUCOSE BLOOD, BY GLUCOSE MONITORING DEVICE is 325.  He drank soda this morning and he has stopped oral diabetic med X 2 weeks. -    I have resent refills today and urged him to be compliant with his med. -      glipiZIDE (GLUCOTROL) 10 mg tablet; TAKE 1 TABLET OP TWICE DAILY. -     metFORMIN (GLUCOPHAGE) 1,000 mg tablet; TAKE 1 TABLET BY MOUTH TWICE DAILY WITH MEALS  -     Increase insulin detemir U-100 (LEVEMIR FLEXTOUCH) 100 unit/mL (3 mL) inpn; 40 Units by SubCUTAneous route nightly.  -     REFERRAL TO OPHTHALMOLOGY              We may consider low dose of  Lisinopril in next visit. Counseled on diet and exercise. 2. Hyperlipidemia with target LDL less than 70  -     urged him to be compliant atorvastatin (LIPITOR) 20 mg tablet; Take 1 Tab by mouth daily. Need appointment for next refill    3. Non compliance w medication regimen      -  Counseling provided. He is aware of the side effect of uncontrolled DM. Advised to monitor BP at home. Over 50% of the 25 minutes face to face with Jovanna [place] consisted of counseling and/or discussing treatment plans in reference to his diagnosis and med non compliance. Medication risks/benefits/costs/interactions/alternatives discussed with patient. Advised patient to call back or return to office if symptoms worsen/change/persist. If patient cannot reach us or should anything more severe/urgent arise he/she should proceed directly to the nearest emergency department. Discussed expected course/resolution/complications of diagnosis in detail with patient. Patient given a written after visit summary which includes her diagnoses, current medications and vitals. Patient expressed understanding with the diagnosis and plan. Follow-up and Dispositions    · Return in about 6 weeks (around 2/12/2020) for Bring diabetic log book. cholestreol. will have fasting blood work.

## 2020-02-11 ENCOUNTER — OFFICE VISIT (OUTPATIENT)
Dept: PRIMARY CARE CLINIC | Age: 55
End: 2020-02-11

## 2020-02-11 VITALS
TEMPERATURE: 98.1 F | HEIGHT: 67 IN | HEART RATE: 76 BPM | DIASTOLIC BLOOD PRESSURE: 83 MMHG | OXYGEN SATURATION: 97 % | BODY MASS INDEX: 37.89 KG/M2 | RESPIRATION RATE: 18 BRPM | SYSTOLIC BLOOD PRESSURE: 123 MMHG | WEIGHT: 241.4 LBS

## 2020-02-11 DIAGNOSIS — E78.5 HYPERLIPIDEMIA WITH TARGET LDL LESS THAN 70: ICD-10-CM

## 2020-02-11 LAB — HBA1C MFR BLD HPLC: 13 %

## 2020-02-11 NOTE — PROGRESS NOTES
Subjective:     Chief Complaint   Patient presents with    Diabetes    Cholesterol Problem        He  is a 49y.o. year old male with hx of DM-2. HLD is here  for follow up on diabetes.   Patient reports that he has been taking oral meds but he ran out levemir about a month ago . States that he did not have money to buy the Insulin. Didn't check BS at home.     Last A1c was >15.     Did not have eye exam yet as advised. Denies any chest pain, soa, blurry vision, urinary symptoms, headache, N/V. Pertinent items are noted in HPI. Objective: There were no vitals filed for this visit. Physical Examination: General appearance - alert, well appearing, and in no distress, oriented to person, place, and time and overweight  Mental status - alert, oriented to person, place, and time, normal mood, behavior, speech, dress, motor activity, and thought processes  Chest - clear to auscultation, no wheezes, rales or rhonchi, symmetric air entry  Heart - normal rate, regular rhythm, normal S1, S2, no murmurs, rubs, clicks or gallops    No Known Allergies   Social History     Socioeconomic History    Marital status: SINGLE     Spouse name: Not on file    Number of children: Not on file    Years of education: Not on file    Highest education level: Not on file   Tobacco Use    Smoking status: Current Every Day Smoker     Packs/day: 1.00    Smokeless tobacco: Never Used   Substance and Sexual Activity    Alcohol use: Yes     Comment: holidays, several timses a yr    Drug use: Never    Sexual activity: Yes     Partners: Female      Family History   Problem Relation Age of Onset    Diabetes Mother     Alcohol abuse Father     No Known Problems Sister     Diabetes Brother     Stroke Brother     No Known Problems Brother     No Known Problems Brother     No Known Problems Sister       No past surgical history on file.    Past Medical History:   Diagnosis Date    Abscess     [address] [address]     Hypercholesterolemia     Sleep apnea       Current Outpatient Medications   Medication Sig Dispense Refill    glipiZIDE (GLUCOTROL) 10 mg tablet TAKE 1 TABLET OP TWICE DAILY. 60 Tab 2    atorvastatin (LIPITOR) 20 mg tablet Take 1 Tab by mouth daily. Need appointment for next refill 30 Tab 2    metFORMIN (GLUCOPHAGE) 1,000 mg tablet TAKE 1 TABLET BY MOUTH TWICE DAILY WITH MEALS 60 Tab 2    insulin detemir U-100 (LEVEMIR FLEXTOUCH) 100 unit/mL (3 mL) inpn 40 Units by SubCUTAneous route nightly. 5 Adjustable Dose Pre-filled Pen Syringe 3    lancets (ONETOUCH DELICA LANCETS) 30 gauge misc Check blood sugar in AM ( fasting ) and 2 hours after dinner. 100 Lancet 11    Blood-Glucose Meter (ONETOUCH ULTRA2 METER) monitoring kit Check blood sugar in AM ( fasting ) and 2 hours after dinner. 1 Kit 0    glucose blood VI test strips (ONETOUCH ULTRA TEST) strip Check blood sugar in AM ( fasting ) and 2 hours after dinner. 100 Strip 11    Insulin Needles, Disposable, 31 gauge x 5/16\" ndle Use once/day 1 Package 11    Insulin Needles, Disposable, (KYLAH PEN NEEDLE) 32 gauge x 5/32\" ndle 1 Units by SubCUTAneous route daily. 3 Pen Needle 6        Assessment/ Plan:   Diagnoses and all orders for this visit:    1. Uncontrolled type 2 diabetes mellitus without complication, without long-term current use of insulin ([facility])  -     AMB POC HEMOGLOBIN A1C is 13, slight improvement from last time. Advised patient that if he needs any financial assistance then let us know. Advised that he can apply online as well. States that he will let me know. Advised that we may switch to regular insulin if needed which will be cheaper but he needs to let me know. -     METABOLIC PANEL, COMPREHENSIVE  -     Continue insulin detemir U-100 (LEVEMIR FLEXTOUCH) 100 unit/mL (3 mL) inpn; 40 Units by SubCUTAneous route nightly. -     Start SITagliptin-metFORMIN (JANUMET) 50-1,000 mg per tablet;  Take 1 Tab by mouth two (2) times daily (with meals). -     Continue Glipizide. 2. Hyperlipidemia with target LDL less than 70  -     LIPID PANEL  -     METABOLIC PANEL, COMPREHENSIVE        -     Continue Lipitor.        -     Continue work on diet and exercise. Spent > 50 % of 25 minutes counseling. Medication risks/benefits/costs/interactions/alternatives discussed with patient. Advised patient to call back or return to office if symptoms worsen/change/persist. If patient cannot reach us or should anything more severe/urgent arise he/she should proceed directly to the nearest emergency department. Discussed expected course/resolution/complications of diagnosis in detail with patient. Patient given a written after visit summary which includes her diagnoses, current medications and vitals. Patient expressed understanding with the diagnosis and plan. Follow-up and Dispositions    · Return in about 3 months (around 5/11/2020), or if symptoms worsen or fail to improve, for DM.

## 2020-02-12 LAB
ALBUMIN SERPL-MCNC: 3.9 G/DL (ref 3.8–4.9)
ALBUMIN/GLOB SERPL: 1.6 {RATIO} (ref 1.2–2.2)
ALP SERPL-CCNC: 96 IU/L (ref 39–117)
ALT SERPL-CCNC: 12 IU/L (ref 0–44)
AST SERPL-CCNC: 11 IU/L (ref 0–40)
BILIRUB SERPL-MCNC: 0.4 MG/DL (ref 0–1.2)
BUN SERPL-MCNC: 4 MG/DL (ref 6–24)
BUN/CREAT SERPL: 5 (ref 9–20)
CALCIUM SERPL-MCNC: 9.4 MG/DL (ref 8.7–10.2)
CHLORIDE SERPL-SCNC: 100 MMOL/L (ref 96–106)
CHOLEST SERPL-MCNC: 135 MG/DL (ref 100–199)
CO2 SERPL-SCNC: 27 MMOL/L (ref 20–29)
CREAT SERPL-MCNC: 0.85 MG/DL (ref 0.76–1.27)
GLOBULIN SER CALC-MCNC: 2.5 G/DL (ref 1.5–4.5)
GLUCOSE SERPL-MCNC: 248 MG/DL (ref 65–99)
HDLC SERPL-MCNC: 43 MG/DL
LDLC SERPL CALC-MCNC: 79 MG/DL (ref 0–99)
POTASSIUM SERPL-SCNC: 5.1 MMOL/L (ref 3.5–5.2)
PROT SERPL-MCNC: 6.4 G/DL (ref 6–8.5)
SODIUM SERPL-SCNC: 140 MMOL/L (ref 134–144)
TRIGL SERPL-MCNC: 63 MG/DL (ref 0–149)
VLDLC SERPL CALC-MCNC: 13 MG/DL (ref 5–40)

## 2020-02-14 NOTE — PROGRESS NOTES
Please mail letter:    Cholesterol level is lot better and well controlled. Continue cholesterol med. Kidney and liver function is fine. Continue diabetic med as advised.

## 2020-09-17 RX ORDER — GLIPIZIDE 10 MG/1
TABLET ORAL
Qty: 30 TAB | Refills: 0 | Status: SHIPPED | OUTPATIENT
Start: 2020-09-17 | End: 2020-09-29

## 2020-09-17 NOTE — TELEPHONE ENCOUNTER
Lisa Warren MD  You 13 minutes ago (1:13 PM)      Please call patient to make appointment for diabetes check up.

## 2020-09-29 RX ORDER — GLIPIZIDE 10 MG/1
TABLET ORAL
Qty: 30 TAB | Refills: 0 | Status: SHIPPED | OUTPATIENT
Start: 2020-09-29 | End: 2020-09-30 | Stop reason: SDUPTHER

## 2020-09-30 ENCOUNTER — OFFICE VISIT (OUTPATIENT)
Dept: PRIMARY CARE CLINIC | Age: 55
End: 2020-09-30
Payer: COMMERCIAL

## 2020-09-30 VITALS
RESPIRATION RATE: 16 BRPM | DIASTOLIC BLOOD PRESSURE: 75 MMHG | OXYGEN SATURATION: 98 % | WEIGHT: 254 LBS | BODY MASS INDEX: 39.87 KG/M2 | SYSTOLIC BLOOD PRESSURE: 128 MMHG | HEART RATE: 80 BPM | HEIGHT: 67 IN | TEMPERATURE: 98.3 F

## 2020-09-30 DIAGNOSIS — Z79.4 TYPE 2 DIABETES MELLITUS WITHOUT COMPLICATION, WITH LONG-TERM CURRENT USE OF INSULIN (HCC): Primary | ICD-10-CM

## 2020-09-30 DIAGNOSIS — Z79.4 TYPE 2 DIABETES MELLITUS WITHOUT COMPLICATION, WITH LONG-TERM CURRENT USE OF INSULIN (HCC): ICD-10-CM

## 2020-09-30 DIAGNOSIS — E78.5 HYPERLIPIDEMIA WITH TARGET LDL LESS THAN 70: ICD-10-CM

## 2020-09-30 DIAGNOSIS — Z91.14 NON COMPLIANCE W MEDICATION REGIMEN: ICD-10-CM

## 2020-09-30 DIAGNOSIS — E11.9 TYPE 2 DIABETES MELLITUS WITHOUT COMPLICATION, WITH LONG-TERM CURRENT USE OF INSULIN (HCC): ICD-10-CM

## 2020-09-30 DIAGNOSIS — E11.9 TYPE 2 DIABETES MELLITUS WITHOUT COMPLICATION, WITH LONG-TERM CURRENT USE OF INSULIN (HCC): Primary | ICD-10-CM

## 2020-09-30 DIAGNOSIS — E66.01 OBESITY, MORBID (HCC): ICD-10-CM

## 2020-09-30 LAB
ALBUMIN SERPL-MCNC: 3.8 G/DL (ref 3.5–5)
ALBUMIN/GLOB SERPL: 1 {RATIO} (ref 1.1–2.2)
ALP SERPL-CCNC: 86 U/L (ref 45–117)
ALT SERPL-CCNC: 25 U/L (ref 12–78)
ANION GAP SERPL CALC-SCNC: 5 MMOL/L (ref 5–15)
AST SERPL-CCNC: 10 U/L (ref 15–37)
BILIRUB SERPL-MCNC: 0.5 MG/DL (ref 0.2–1)
BUN SERPL-MCNC: 8 MG/DL (ref 6–20)
BUN/CREAT SERPL: 9 (ref 12–20)
CALCIUM SERPL-MCNC: 9.3 MG/DL (ref 8.5–10.1)
CHLORIDE SERPL-SCNC: 103 MMOL/L (ref 97–108)
CHOLEST SERPL-MCNC: 241 MG/DL
CO2 SERPL-SCNC: 28 MMOL/L (ref 21–32)
CREAT SERPL-MCNC: 0.94 MG/DL (ref 0.7–1.3)
GLOBULIN SER CALC-MCNC: 4 G/DL (ref 2–4)
GLUCOSE SERPL-MCNC: 273 MG/DL (ref 65–100)
HBA1C MFR BLD HPLC: 8.3 %
HDLC SERPL-MCNC: 48 MG/DL
HDLC SERPL: 5 {RATIO} (ref 0–5)
LDLC SERPL CALC-MCNC: 176.2 MG/DL (ref 0–100)
LIPID PROFILE,FLP: ABNORMAL
POTASSIUM SERPL-SCNC: 4.1 MMOL/L (ref 3.5–5.1)
PROT SERPL-MCNC: 7.8 G/DL (ref 6.4–8.2)
SODIUM SERPL-SCNC: 136 MMOL/L (ref 136–145)
TRIGL SERPL-MCNC: 84 MG/DL (ref ?–150)
VLDLC SERPL CALC-MCNC: 16.8 MG/DL

## 2020-09-30 PROCEDURE — 83036 HEMOGLOBIN GLYCOSYLATED A1C: CPT | Performed by: FAMILY MEDICINE

## 2020-09-30 PROCEDURE — 3052F HG A1C>EQUAL 8.0%<EQUAL 9.0%: CPT | Performed by: FAMILY MEDICINE

## 2020-09-30 PROCEDURE — 99214 OFFICE O/P EST MOD 30 MIN: CPT | Performed by: FAMILY MEDICINE

## 2020-09-30 RX ORDER — ATORVASTATIN CALCIUM 20 MG/1
20 TABLET, FILM COATED ORAL DAILY
Qty: 30 TAB | Refills: 4 | Status: SHIPPED | OUTPATIENT
Start: 2020-09-30 | End: 2020-12-30

## 2020-09-30 RX ORDER — GLIPIZIDE 10 MG/1
TABLET ORAL
Qty: 60 TAB | Refills: 2 | Status: SHIPPED | OUTPATIENT
Start: 2020-09-30 | End: 2020-10-16

## 2020-09-30 RX ORDER — PEN NEEDLE, DIABETIC 31 GX3/16"
1 NEEDLE, DISPOSABLE MISCELLANEOUS DAILY
Qty: 3 PEN NEEDLE | Refills: 6 | Status: SHIPPED | OUTPATIENT
Start: 2020-09-30 | End: 2022-06-15 | Stop reason: SDUPTHER

## 2020-09-30 NOTE — PROGRESS NOTES
Karson Ayoub is a 48 y.o. male  Chief Complaint   Patient presents with    Diabetes     Health Maintenance Due   Topic Date Due    Pneumococcal 0-64 years (1 of 1 - PPSV23) 04/09/1972    Eye Exam Retinal or Dilated  04/09/1976    GLAUCOMA SCREENING Q2Y  04/09/1984    DTaP/Tdap/Td series (1 - Tdap) 04/09/1987    Shingrix Vaccine Age 50> (1 of 2) 04/09/2016    FOBT Q1Y Age 50-75  04/09/2016    Foot Exam Q1  08/02/2019    A1C test (Diabetic or Prediabetic)  05/11/2020    Flu Vaccine (1) 09/01/2020   No eye exam  Visit Vitals  /75 (BP 1 Location: Right arm, BP Patient Position: Sitting)   Pulse 80   Temp 98.3 °F (36.8 °C) (Oral)   Resp 16   Ht 5' 7\" (1.702 m)   Wt 254 lb (115.2 kg)   SpO2 98%   BMI 39.78 kg/m²     1. Have you been to the ER, urgent care clinic since your last visit? Hospitalized since your last visit? No    2. Have you seen or consulted any other health care providers outside of the [address] [place] [address] since your last visit? Include any pap smears or colon screening.  No

## 2020-09-30 NOTE — PROGRESS NOTES
Subjective:     Chief Complaint   Patient presents with    Diabetes    Cholesterol Problem        He  is a 48y.o. year old male  with hx of DM-2. HLD is here  for follow up on diabetes, HLD.   Patient reports that he has been taking oral meds but he has not taken levemir for few months. Reports that his FBS has been running 109-120s. Last A1c was 13. Reports that he has been watching his diet. HLD: He has not taken Lipitor for few months. States that the pharmacy never gave that med to him so he did not take the med.     Did not have eye exam yet as advised.    Denies any chest pain, soa, blurry vision, urinary symptoms, headache, N/V.        Pertinent items are noted in HPI.   Objective:     Vitals:    09/30/20 0839   BP: 128/75   Pulse: 80   Resp: 16   Temp: 98.3 °F (36.8 °C)   TempSrc: Oral   SpO2: 98%   Weight: 254 lb (115.2 kg)   Height: 5' 7\" (1.702 m)       Physical Examination: General appearance - alert, well appearing, and in no distress, oriented to person, place, and time and overweight  Mental status - alert, oriented to person, place, and time, normal mood, behavior, speech, dress, motor activity, and thought processes  Chest - clear to auscultation, no wheezes, rales or rhonchi, symmetric air entry  Heart - normal rate, regular rhythm, normal S1, S2, no murmurs, rubs, clicks or gallops  Extremities - peripheral pulses normal, no pedal edema, no clubbing or cyanosis, feet normal, good pulses, normal color, temperature and sensation, monofilament sensory exam is normal in both feet    No Known Allergies   Social History     Socioeconomic History    Marital status: SINGLE     Spouse name: Not on file    Number of children: Not on file    Years of education: Not on file    Highest education level: Not on file   Tobacco Use    Smoking status: Current Every Day Smoker     Packs/day: 1.00    Smokeless tobacco: Never Used   Substance and Sexual Activity    Alcohol use: Yes     Comment: holidays, several timses a yr    Drug use: Never    Sexual activity: Yes     Partners: Female      Family History   Problem Relation Age of Onset    Diabetes Mother     Alcohol abuse Father     No Known Problems Sister     Diabetes Brother     Stroke Brother     No Known Problems Brother     No Known Problems Brother     No Known Problems Sister       History reviewed. No pertinent surgical history. Past Medical History:   Diagnosis Date    Abscess     [address] [address]     Hypercholesterolemia     Sleep apnea       Current Outpatient Medications   Medication Sig Dispense Refill    glipiZIDE (GLUCOTROL) 10 mg tablet TAKE 1 TABLET BY MOUTH TWICE DAILY 30 Tab 0    insulin detemir U-100 (LEVEMIR FLEXTOUCH) 100 unit/mL (3 mL) inpn 40 Units by SubCUTAneous route nightly. 4 Adjustable Dose Pre-filled Pen Syringe 3    SITagliptin-metFORMIN (JANUMET) 50-1,000 mg per tablet Take 1 Tab by mouth two (2) times daily (with meals). 60 Tab 5    atorvastatin (LIPITOR) 20 mg tablet Take 1 Tab by mouth daily. Need appointment for next refill 30 Tab 2    lancets (ONETOUCH DELICA LANCETS) 30 gauge misc Check blood sugar in AM ( fasting ) and 2 hours after dinner. 100 Lancet 11    Blood-Glucose Meter (ONETOUCH ULTRA2 METER) monitoring kit Check blood sugar in AM ( fasting ) and 2 hours after dinner. 1 Kit 0    glucose blood VI test strips (ONETOUCH ULTRA TEST) strip Check blood sugar in AM ( fasting ) and 2 hours after dinner. 100 Strip 11    Insulin Needles, Disposable, 31 gauge x 5/16\" ndle Use once/day 1 Package 11    Insulin Needles, Disposable, (KYLAH PEN NEEDLE) 32 gauge x 5/32\" ndle 1 Units by SubCUTAneous route daily. 3 Pen Needle 6        Assessment/ Plan:   Diagnoses and all orders for this visit:    1.  Type 2 diabetes mellitus without complication, with long-term current use of insulin ([facility])  -     AMB POC HEMOGLOBIN A1C improved to 8.3 from 13.  -      DIABETES FOOT EXAM  -     glipiZIDE (GLUCOTROL) 10 mg tablet; TAKE 1 TABLET BY MOUTH TWICE DAILY  -   take  insulin detemir U-100 (LEVEMIR FLEXTOUCH) 100 unit/mL (3 mL) inpn; 25 Units by SubCUTAneous route nightly.  -     SITagliptin-metFORMIN (JANUMET) 50-1,000 mg per tablet; Take 1 Tab by mouth two (2) times daily (with meals). -     Insulin Needles, Disposable, (Esther Pen Needle) 32 gauge x 5/32\" ndle; 1 Units by SubCUTAneous route daily.  -     METABOLIC PANEL, COMPREHENSIVE; Future  -     LIPID PANEL; Future    Last Point of Care HGB A1C  Hemoglobin A1c (POC)   Date Value Ref Range Status   09/30/2020 8.3 % Final          2. Hyperlipidemia with target LDL less than 70  -     atorvastatin (LIPITOR) 20 mg tablet; Take 1 Tab by mouth daily.  -     METABOLIC PANEL, COMPREHENSIVE; Future  -     LIPID PANEL; Future    3. Obesity, morbid ([facility] [address]      Counseled on diet and exercise. 4. Non compliance w medication regimen       Counseling provided. Medication risks/benefits/costs/interactions/alternatives discussed with patient. Advised patient to call back or return to office if symptoms worsen/change/persist. If patient cannot reach us or should anything more severe/urgent arise he/she should proceed directly to the nearest emergency department. Discussed expected course/resolution/complications of diagnosis in detail with patient. Patient given a written after visit summary which includes her diagnoses, current medications and vitals. Patient expressed understanding with the diagnosis and plan. Follow-up and Dispositions    · Return in about 3 months (around 12/30/2020), or if symptoms worsen or fail to improve, for Bring diabetic log book. , Cholesterol,  Bring all your med with you. Yoli Castano

## 2020-10-02 NOTE — PROGRESS NOTES
Please mail letter:    Cholesterol level is elevated as expected since you have taken cholesterol med for a while Please make sure to take Atorvastatin daily. Refill was sent already. Kidney and liver function if fine. Make sure to take your Insulin as advised. Follow up in 3 months.

## 2020-12-30 ENCOUNTER — OFFICE VISIT (OUTPATIENT)
Dept: PRIMARY CARE CLINIC | Age: 55
End: 2020-12-30
Payer: COMMERCIAL

## 2020-12-30 VITALS
TEMPERATURE: 97.8 F | RESPIRATION RATE: 16 BRPM | HEART RATE: 79 BPM | BODY MASS INDEX: 38.89 KG/M2 | DIASTOLIC BLOOD PRESSURE: 83 MMHG | SYSTOLIC BLOOD PRESSURE: 131 MMHG | HEIGHT: 67 IN | WEIGHT: 247.8 LBS | OXYGEN SATURATION: 97 %

## 2020-12-30 DIAGNOSIS — E78.5 HYPERLIPIDEMIA WITH TARGET LDL LESS THAN 70: ICD-10-CM

## 2020-12-30 DIAGNOSIS — Z79.4 TYPE 2 DIABETES MELLITUS WITHOUT COMPLICATION, WITH LONG-TERM CURRENT USE OF INSULIN (HCC): Primary | ICD-10-CM

## 2020-12-30 DIAGNOSIS — E11.9 TYPE 2 DIABETES MELLITUS WITHOUT COMPLICATION, WITH LONG-TERM CURRENT USE OF INSULIN (HCC): Primary | ICD-10-CM

## 2020-12-30 DIAGNOSIS — Z12.11 SCREEN FOR COLON CANCER: ICD-10-CM

## 2020-12-30 DIAGNOSIS — E66.01 OBESITY, MORBID (HCC): ICD-10-CM

## 2020-12-30 LAB — HBA1C MFR BLD HPLC: 11.5 %

## 2020-12-30 PROCEDURE — 83036 HEMOGLOBIN GLYCOSYLATED A1C: CPT | Performed by: FAMILY MEDICINE

## 2020-12-30 PROCEDURE — 99214 OFFICE O/P EST MOD 30 MIN: CPT | Performed by: FAMILY MEDICINE

## 2020-12-30 RX ORDER — GLIPIZIDE 10 MG/1
TABLET ORAL
Qty: 180 TAB | Refills: 1 | Status: SHIPPED | OUTPATIENT
Start: 2020-12-30 | End: 2021-01-06

## 2020-12-30 RX ORDER — PEN NEEDLE, DIABETIC 30 GX3/16"
NEEDLE, DISPOSABLE MISCELLANEOUS
Qty: 1 PACKAGE | Refills: 11 | Status: SHIPPED | OUTPATIENT
Start: 2020-12-30 | End: 2022-06-15 | Stop reason: SDUPTHER

## 2020-12-30 RX ORDER — ATORVASTATIN CALCIUM 40 MG/1
40 TABLET, FILM COATED ORAL DAILY
Qty: 90 TAB | Refills: 1 | Status: SHIPPED | OUTPATIENT
Start: 2020-12-30 | End: 2021-07-13

## 2020-12-30 NOTE — PROGRESS NOTES
Chief Complaint   Patient presents with    Follow-up     Pt presents today to follow-up for diabetes and cholesterol check. Pt is fasting.      Diabetes    Cholesterol Problem

## 2020-12-30 NOTE — PATIENT INSTRUCTIONS
RX JORDIN Sliding Scale Other Other: For Blood Sugar (mg/dl) of:              
 151-200=2 units 201-250=4 units 251-300=6units 301-350=10 units        Over 350= Call MD 
Hypoglycemia treatment per protocol: use juice,  
glucose tabs, Dextrose 50% or glucagon.

## 2020-12-30 NOTE — PROGRESS NOTES
Subjective:     Chief Complaint   Patient presents with    Follow-up     Pt presents today to follow-up for diabetes and cholesterol check. Pt is fasting.  Diabetes    Cholesterol Problem        He  is a 48y.o. year old male with hx of DM-2. HLD is here  for follow up on diabetes, HLD.   Patient reports that he has been taking oral meds but he has not taken levemir for the past 5-6 months. Cannot afford  Levemir. Reports that his FBS has been running 140s. Last A1c was 8.3       HLD: has been taking Lipitor regularly per patient.     Did not have eye exam yet as advised.    Denies any chest pain, soa, blurry vision, urinary symptoms, headache, N/V.     Last Point of Care HGB A1C  Hemoglobin A1c (POC)   Date Value Ref Range Status   09/30/2020 8.3 % Final      Lab Results   Component Value Date/Time    Cholesterol, total 241 (H) 09/30/2020 09:32 AM    HDL Cholesterol 48 09/30/2020 09:32 AM    LDL, calculated 176.2 (H) 09/30/2020 09:32 AM    VLDL, calculated 16.8 09/30/2020 09:32 AM    Triglyceride 84 09/30/2020 09:32 AM    CHOL/HDL Ratio 5.0 09/30/2020 09:32 AM            Pertinent items are noted in HPI.   Objective:     Vitals:    12/30/20 0817   BP: 131/83   Pulse: 79   Resp: 16   Temp: 97.8 °F (36.6 °C)   TempSrc: Temporal   SpO2: 97%   Weight: 247 lb 12.8 oz (112.4 kg)   Height: 5' 7\" (1.702 m)       Physical Examination: General appearance - alert, well appearing, and in no distress, oriented to person, place, and time and overweight  Mental status - alert, oriented to person, place, and time, normal mood, behavior, speech, dress, motor activity, and thought processes  Chest - clear to auscultation, no wheezes, rales or rhonchi, symmetric air entry  Heart - normal rate, regular rhythm, normal S1, S2, no murmurs, rubs, clicks or gallops    No Known Allergies   Social History     Socioeconomic History    Marital status: SINGLE     Spouse name: Not on file    Number of children: Not on file    Years of education: Not on file    Highest education level: Not on file   Tobacco Use    Smoking status: Current Every Day Smoker     Packs/day: 1.00    Smokeless tobacco: Never Used   Substance and Sexual Activity    Alcohol use: Yes     Comment: holidays, several timses a yr    Drug use: Never    Sexual activity: Yes     Partners: Female      Family History   Problem Relation Age of Onset    Diabetes Mother     Alcohol abuse Father     No Known Problems Sister     Diabetes Brother     Stroke Brother     No Known Problems Brother     No Known Problems Brother     No Known Problems Sister       No past surgical history on file. Past Medical History:   Diagnosis Date    Abscess     Diabetes ([facility] [address]     Hypercholesterolemia     Sleep apnea       Current Outpatient Medications   Medication Sig Dispense Refill    glipiZIDE (GLUCOTROL) 10 mg tablet TAKE 1 TABLET BY MOUTH TWICE DAILY 90 Tab 0    atorvastatin (LIPITOR) 20 mg tablet Take 1 Tab by mouth daily. 30 Tab 4    insulin detemir U-100 (LEVEMIR FLEXTOUCH) 100 unit/mL (3 mL) inpn 25 Units by SubCUTAneous route nightly. 5 Adjustable Dose Pre-filled Pen Syringe 3    SITagliptin-metFORMIN (JANUMET) 50-1,000 mg per tablet Take 1 Tab by mouth two (2) times daily (with meals). 60 Tab 4    Insulin Needles, Disposable, (Esther Pen Needle) 32 gauge x 5/32\" ndle 1 Units by SubCUTAneous route daily. 3 Pen Needle 6    lancets (ONETOUCH DELICA LANCETS) 30 gauge misc Check blood sugar in AM ( fasting ) and 2 hours after dinner. 100 Lancet 11    Blood-Glucose Meter (ONETOUCH ULTRA2 METER) monitoring kit Check blood sugar in AM ( fasting ) and 2 hours after dinner. 1 Kit 0    glucose blood VI test strips (ONETOUCH ULTRA TEST) strip Check blood sugar in AM ( fasting ) and 2 hours after dinner. 100 Strip 11    Insulin Needles, Disposable, 31 gauge x 5/16\" ndle Use once/day 1 Package 11        Assessment/ Plan:   Diagnoses and all orders for this visit:    1.  Type 2 diabetes mellitus without complication, with long-term current use of insulin ([facility])  -     AMB POC HEMOGLOBIN A1C  Last Point of Care HGB A1C  Hemoglobin A1c (POC)   Date Value Ref Range Status   12/30/2020 11.5 % Final      Patient cannot afford long acting Insulin.  -     METABOLIC PANEL, COMPREHENSIVE; Future  -     MICROALBUMIN, UR, RAND W/ MICROALB/CREAT RATIO; Future  -    Start  insulin nph-regular human rec (HUMULIN 70-30) 100 unit/mL (70-30) inpn; 12 Units by SubCUTAneous route two (2) times daily (with meals). -   continue  glipiZIDE (GLUCOTROL) 10 mg tablet; TAKE 1 TABLET BY MOUTH TWICE DAILY\  Patient declined nutrition counseling. Strongly advised to let me know if he cannot afford the insulin. 2. Hyperlipidemia with target LDL less than 70  -     LIPID PANEL; Future  -     METABOLIC PANEL, COMPREHENSIVE; Future              LDL level improved. Increase Lipitor to 40 mg. New prescription sent to pharmacy. Lab Results   Component Value Date/Time    LDL, calculated 126.8 (H) 12/30/2020 09:27 AM       3. Obesity, morbid ([address]      Counseled on diet and exercise. 4. Screen for colon cancer  -     REFERRAL FOR COLONOSCOPY           Medication risks/benefits/costs/interactions/alternatives discussed with patient. Advised patient to call back or return to office if symptoms worsen/change/persist. If patient cannot reach us or should anything more severe/urgent arise he/she should proceed directly to the nearest emergency department. Discussed expected course/resolution/complications of diagnosis in detail with patient. Patient given a written after visit summary which includes her diagnoses, current medications and vitals. Patient expressed understanding with the diagnosis and plan. Follow-up and Dispositions    · Return in about 1 month (around 1/30/2021), or if symptoms worsen or fail to improve, for Bring diabetic log book. , cholesterol.

## 2020-12-31 DIAGNOSIS — Z79.4 TYPE 2 DIABETES MELLITUS WITHOUT COMPLICATION, WITH LONG-TERM CURRENT USE OF INSULIN (HCC): ICD-10-CM

## 2020-12-31 DIAGNOSIS — E11.9 TYPE 2 DIABETES MELLITUS WITHOUT COMPLICATION, WITH LONG-TERM CURRENT USE OF INSULIN (HCC): ICD-10-CM

## 2021-01-06 DIAGNOSIS — E11.9 TYPE 2 DIABETES MELLITUS WITHOUT COMPLICATION, WITH LONG-TERM CURRENT USE OF INSULIN (HCC): ICD-10-CM

## 2021-01-06 DIAGNOSIS — Z79.4 TYPE 2 DIABETES MELLITUS WITHOUT COMPLICATION, WITH LONG-TERM CURRENT USE OF INSULIN (HCC): ICD-10-CM

## 2021-01-06 RX ORDER — GLIPIZIDE 10 MG/1
TABLET ORAL
Qty: 90 TAB | Refills: 0 | Status: SHIPPED | OUTPATIENT
Start: 2021-01-06 | End: 2021-08-23

## 2021-08-09 ENCOUNTER — APPOINTMENT (OUTPATIENT)
Dept: CT IMAGING | Age: 56
End: 2021-08-09
Attending: EMERGENCY MEDICINE
Payer: COMMERCIAL

## 2021-08-09 LAB
ALBUMIN SERPL-MCNC: 3.8 G/DL (ref 3.5–5)
ALBUMIN/GLOB SERPL: 0.8 {RATIO} (ref 1.1–2.2)
ALP SERPL-CCNC: 108 U/L (ref 45–117)
ALT SERPL-CCNC: 24 U/L (ref 12–78)
ANION GAP SERPL CALC-SCNC: 6 MMOL/L (ref 5–15)
AST SERPL-CCNC: 16 U/L (ref 15–37)
ATRIAL RATE: 93 BPM
BASOPHILS # BLD: 0 K/UL (ref 0–0.1)
BASOPHILS NFR BLD: 0 % (ref 0–1)
BILIRUB SERPL-MCNC: 0.5 MG/DL (ref 0.2–1)
BUN SERPL-MCNC: 12 MG/DL (ref 6–20)
BUN/CREAT SERPL: 14 (ref 12–20)
CALCIUM SERPL-MCNC: 9.7 MG/DL (ref 8.5–10.1)
CALCULATED P AXIS, ECG09: 63 DEGREES
CALCULATED R AXIS, ECG10: 5 DEGREES
CALCULATED T AXIS, ECG11: -27 DEGREES
CHLORIDE SERPL-SCNC: 100 MMOL/L (ref 97–108)
CO2 SERPL-SCNC: 28 MMOL/L (ref 21–32)
COMMENT, HOLDF: NORMAL
CREAT SERPL-MCNC: 0.84 MG/DL (ref 0.7–1.3)
DIAGNOSIS, 93000: NORMAL
DIFFERENTIAL METHOD BLD: ABNORMAL
EOSINOPHIL # BLD: 0 K/UL (ref 0–0.4)
EOSINOPHIL NFR BLD: 0 % (ref 0–7)
ERYTHROCYTE [DISTWIDTH] IN BLOOD BY AUTOMATED COUNT: 12 % (ref 11.5–14.5)
GLOBULIN SER CALC-MCNC: 4.6 G/DL (ref 2–4)
GLUCOSE SERPL-MCNC: 282 MG/DL (ref 65–100)
HCT VFR BLD AUTO: 46.7 % (ref 36.6–50.3)
HGB BLD-MCNC: 16.2 G/DL (ref 12.1–17)
IMM GRANULOCYTES # BLD AUTO: 0 K/UL (ref 0–0.04)
IMM GRANULOCYTES NFR BLD AUTO: 0 % (ref 0–0.5)
LYMPHOCYTES # BLD: 1.8 K/UL (ref 0.8–3.5)
LYMPHOCYTES NFR BLD: 15 % (ref 12–49)
MCH RBC QN AUTO: 31.1 PG (ref 26–34)
MCHC RBC AUTO-ENTMCNC: 34.7 G/DL (ref 30–36.5)
MCV RBC AUTO: 89.6 FL (ref 80–99)
MONOCYTES # BLD: 0.6 K/UL (ref 0–1)
MONOCYTES NFR BLD: 5 % (ref 5–13)
NEUTS SEG # BLD: 9.3 K/UL (ref 1.8–8)
NEUTS SEG NFR BLD: 80 % (ref 32–75)
NRBC # BLD: 0 K/UL (ref 0–0.01)
NRBC BLD-RTO: 0 PER 100 WBC
P-R INTERVAL, ECG05: 182 MS
PLATELET # BLD AUTO: 170 K/UL (ref 150–400)
PMV BLD AUTO: 12.2 FL (ref 8.9–12.9)
POTASSIUM SERPL-SCNC: 4.5 MMOL/L (ref 3.5–5.1)
PROT SERPL-MCNC: 8.4 G/DL (ref 6.4–8.2)
Q-T INTERVAL, ECG07: 344 MS
QRS DURATION, ECG06: 84 MS
QTC CALCULATION (BEZET), ECG08: 427 MS
RBC # BLD AUTO: 5.21 M/UL (ref 4.1–5.7)
SAMPLES BEING HELD,HOLD: NORMAL
SODIUM SERPL-SCNC: 134 MMOL/L (ref 136–145)
VENTRICULAR RATE, ECG03: 93 BPM
WBC # BLD AUTO: 11.7 K/UL (ref 4.1–11.1)

## 2021-08-09 PROCEDURE — 85025 COMPLETE CBC W/AUTO DIFF WBC: CPT

## 2021-08-09 PROCEDURE — 96374 THER/PROPH/DIAG INJ IV PUSH: CPT

## 2021-08-09 PROCEDURE — 80053 COMPREHEN METABOLIC PANEL: CPT

## 2021-08-09 PROCEDURE — 70450 CT HEAD/BRAIN W/O DYE: CPT

## 2021-08-09 PROCEDURE — 93005 ELECTROCARDIOGRAM TRACING: CPT

## 2021-08-09 PROCEDURE — 70498 CT ANGIOGRAPHY NECK: CPT

## 2021-08-09 PROCEDURE — 99284 EMERGENCY DEPT VISIT MOD MDM: CPT

## 2021-08-09 PROCEDURE — 36415 COLL VENOUS BLD VENIPUNCTURE: CPT

## 2021-08-09 PROCEDURE — 74011000636 HC RX REV CODE- 636: Performed by: RADIOLOGY

## 2021-08-09 RX ORDER — ONDANSETRON 2 MG/ML
4 INJECTION INTRAMUSCULAR; INTRAVENOUS
Status: ACTIVE | OUTPATIENT
Start: 2021-08-09 | End: 2021-08-10

## 2021-08-09 RX ADMIN — IOPAMIDOL 100 ML: 755 INJECTION, SOLUTION INTRAVENOUS at 19:13

## 2021-08-09 NOTE — ED TRIAGE NOTES
Patient presents from home with complaints of sudden on set dizziness yesterday morning. Patient reports his balance is off and he can not walk well.  Patient also reports nausea

## 2021-08-10 ENCOUNTER — APPOINTMENT (OUTPATIENT)
Dept: MRI IMAGING | Age: 56
End: 2021-08-10
Attending: STUDENT IN AN ORGANIZED HEALTH CARE EDUCATION/TRAINING PROGRAM
Payer: COMMERCIAL

## 2021-08-10 ENCOUNTER — HOSPITAL ENCOUNTER (OUTPATIENT)
Age: 56
Setting detail: OBSERVATION
Discharge: HOME | End: 2021-08-10
Attending: EMERGENCY MEDICINE | Admitting: STUDENT IN AN ORGANIZED HEALTH CARE EDUCATION/TRAINING PROGRAM
Payer: COMMERCIAL

## 2021-08-10 VITALS
WEIGHT: 250 LBS | RESPIRATION RATE: 14 BRPM | HEART RATE: 81 BPM | OXYGEN SATURATION: 94 % | BODY MASS INDEX: 39.16 KG/M2 | DIASTOLIC BLOOD PRESSURE: 72 MMHG | SYSTOLIC BLOOD PRESSURE: 158 MMHG | TEMPERATURE: 97.9 F

## 2021-08-10 DIAGNOSIS — R42 VERTIGO: Primary | ICD-10-CM

## 2021-08-10 LAB
ALBUMIN SERPL-MCNC: 3.6 G/DL (ref 3.5–5)
ALBUMIN/GLOB SERPL: 0.9 {RATIO} (ref 1.1–2.2)
ALP SERPL-CCNC: 95 U/L (ref 45–117)
ALT SERPL-CCNC: 25 U/L (ref 12–78)
ANION GAP SERPL CALC-SCNC: 5 MMOL/L (ref 5–15)
AST SERPL-CCNC: 13 U/L (ref 15–37)
BILIRUB SERPL-MCNC: 0.6 MG/DL (ref 0.2–1)
BUN SERPL-MCNC: 15 MG/DL (ref 6–20)
BUN/CREAT SERPL: 16 (ref 12–20)
CALCIUM SERPL-MCNC: 9 MG/DL (ref 8.5–10.1)
CHLORIDE SERPL-SCNC: 99 MMOL/L (ref 97–108)
CHOLEST SERPL-MCNC: 130 MG/DL
CO2 SERPL-SCNC: 30 MMOL/L (ref 21–32)
CREAT SERPL-MCNC: 0.96 MG/DL (ref 0.7–1.3)
EST. AVERAGE GLUCOSE BLD GHB EST-MCNC: 289 MG/DL
FOLATE SERPL-MCNC: 17.6 NG/ML (ref 5–21)
GLOBULIN SER CALC-MCNC: 4 G/DL (ref 2–4)
GLUCOSE BLD STRIP.AUTO-MCNC: 276 MG/DL (ref 65–117)
GLUCOSE SERPL-MCNC: 272 MG/DL (ref 65–100)
HBA1C MFR BLD: 11.7 % (ref 4–5.6)
HDLC SERPL-MCNC: 38 MG/DL
HDLC SERPL: 3.4 {RATIO} (ref 0–5)
LDLC SERPL CALC-MCNC: 76.2 MG/DL (ref 0–100)
MAGNESIUM SERPL-MCNC: 2.1 MG/DL (ref 1.6–2.4)
PHOSPHATE SERPL-MCNC: 3 MG/DL (ref 2.6–4.7)
POTASSIUM SERPL-SCNC: 3.5 MMOL/L (ref 3.5–5.1)
PROT SERPL-MCNC: 7.6 G/DL (ref 6.4–8.2)
SERVICE CMNT-IMP: ABNORMAL
SODIUM SERPL-SCNC: 134 MMOL/L (ref 136–145)
TRIGL SERPL-MCNC: 79 MG/DL (ref ?–150)
TSH SERPL DL<=0.05 MIU/L-ACNC: 0.94 UIU/ML (ref 0.36–3.74)
VIT B12 SERPL-MCNC: 353 PG/ML (ref 193–986)
VLDLC SERPL CALC-MCNC: 15.8 MG/DL

## 2021-08-10 PROCEDURE — 74011636637 HC RX REV CODE- 636/637: Performed by: STUDENT IN AN ORGANIZED HEALTH CARE EDUCATION/TRAINING PROGRAM

## 2021-08-10 PROCEDURE — 70551 MRI BRAIN STEM W/O DYE: CPT

## 2021-08-10 PROCEDURE — 36415 COLL VENOUS BLD VENIPUNCTURE: CPT

## 2021-08-10 PROCEDURE — 82962 GLUCOSE BLOOD TEST: CPT

## 2021-08-10 PROCEDURE — 99218 HC RM OBSERVATION: CPT

## 2021-08-10 PROCEDURE — 84443 ASSAY THYROID STIM HORMONE: CPT

## 2021-08-10 PROCEDURE — 83735 ASSAY OF MAGNESIUM: CPT

## 2021-08-10 PROCEDURE — 74011250636 HC RX REV CODE- 250/636: Performed by: HOSPITALIST

## 2021-08-10 PROCEDURE — 80053 COMPREHEN METABOLIC PANEL: CPT

## 2021-08-10 PROCEDURE — 84100 ASSAY OF PHOSPHORUS: CPT

## 2021-08-10 PROCEDURE — 82746 ASSAY OF FOLIC ACID SERUM: CPT

## 2021-08-10 PROCEDURE — 80061 LIPID PANEL: CPT

## 2021-08-10 PROCEDURE — 82607 VITAMIN B-12: CPT

## 2021-08-10 PROCEDURE — 74011250636 HC RX REV CODE- 250/636: Performed by: STUDENT IN AN ORGANIZED HEALTH CARE EDUCATION/TRAINING PROGRAM

## 2021-08-10 PROCEDURE — 74011250637 HC RX REV CODE- 250/637: Performed by: STUDENT IN AN ORGANIZED HEALTH CARE EDUCATION/TRAINING PROGRAM

## 2021-08-10 PROCEDURE — 83036 HEMOGLOBIN GLYCOSYLATED A1C: CPT

## 2021-08-10 RX ORDER — INSULIN LISPRO 100 [IU]/ML
INJECTION, SOLUTION INTRAVENOUS; SUBCUTANEOUS
Status: DISCONTINUED | OUTPATIENT
Start: 2021-08-10 | End: 2021-08-10 | Stop reason: HOSPADM

## 2021-08-10 RX ORDER — ONDANSETRON 4 MG/1
4 TABLET, ORALLY DISINTEGRATING ORAL
Status: DISCONTINUED | OUTPATIENT
Start: 2021-08-10 | End: 2021-08-10 | Stop reason: HOSPADM

## 2021-08-10 RX ORDER — ACETAMINOPHEN 325 MG/1
650 TABLET ORAL
Status: DISCONTINUED | OUTPATIENT
Start: 2021-08-10 | End: 2021-08-10 | Stop reason: HOSPADM

## 2021-08-10 RX ORDER — MAGNESIUM SULFATE 100 %
4 CRYSTALS MISCELLANEOUS AS NEEDED
Status: DISCONTINUED | OUTPATIENT
Start: 2021-08-10 | End: 2021-08-10 | Stop reason: HOSPADM

## 2021-08-10 RX ORDER — POLYETHYLENE GLYCOL 3350 17 G/17G
17 POWDER, FOR SOLUTION ORAL DAILY PRN
Status: DISCONTINUED | OUTPATIENT
Start: 2021-08-10 | End: 2021-08-10 | Stop reason: HOSPADM

## 2021-08-10 RX ORDER — MECLIZINE HYDROCHLORIDE 25 MG/1
TABLET ORAL
Qty: 30 TABLET | Refills: 0 | Status: SHIPPED | OUTPATIENT
Start: 2021-08-10 | End: 2022-02-14 | Stop reason: ALTCHOICE

## 2021-08-10 RX ORDER — MECLIZINE HCL 12.5 MG 12.5 MG/1
25 TABLET ORAL 3 TIMES DAILY
Status: DISCONTINUED | OUTPATIENT
Start: 2021-08-10 | End: 2021-08-10

## 2021-08-10 RX ORDER — ATORVASTATIN CALCIUM 20 MG/1
40 TABLET, FILM COATED ORAL DAILY
Status: DISCONTINUED | OUTPATIENT
Start: 2021-08-10 | End: 2021-08-10 | Stop reason: HOSPADM

## 2021-08-10 RX ORDER — ACETAMINOPHEN 650 MG/1
650 SUPPOSITORY RECTAL
Status: DISCONTINUED | OUTPATIENT
Start: 2021-08-10 | End: 2021-08-10 | Stop reason: HOSPADM

## 2021-08-10 RX ORDER — SODIUM CHLORIDE 0.9 % (FLUSH) 0.9 %
5-40 SYRINGE (ML) INJECTION EVERY 8 HOURS
Status: DISCONTINUED | OUTPATIENT
Start: 2021-08-10 | End: 2021-08-10 | Stop reason: HOSPADM

## 2021-08-10 RX ORDER — SODIUM CHLORIDE 0.9 % (FLUSH) 0.9 %
5-40 SYRINGE (ML) INJECTION AS NEEDED
Status: DISCONTINUED | OUTPATIENT
Start: 2021-08-10 | End: 2021-08-10 | Stop reason: HOSPADM

## 2021-08-10 RX ORDER — ONDANSETRON 2 MG/ML
4 INJECTION INTRAMUSCULAR; INTRAVENOUS
Status: DISCONTINUED | OUTPATIENT
Start: 2021-08-10 | End: 2021-08-10 | Stop reason: HOSPADM

## 2021-08-10 RX ORDER — DEXTROSE 50 % IN WATER (D50W) INTRAVENOUS SYRINGE
25-50 AS NEEDED
Status: DISCONTINUED | OUTPATIENT
Start: 2021-08-10 | End: 2021-08-10 | Stop reason: HOSPADM

## 2021-08-10 RX ORDER — MECLIZINE HCL 12.5 MG 12.5 MG/1
25 TABLET ORAL
Status: COMPLETED | OUTPATIENT
Start: 2021-08-10 | End: 2021-08-10

## 2021-08-10 RX ADMIN — ATORVASTATIN CALCIUM 40 MG: 20 TABLET, FILM COATED ORAL at 08:45

## 2021-08-10 RX ADMIN — INSULIN LISPRO 5 UNITS: 100 INJECTION, SOLUTION INTRAVENOUS; SUBCUTANEOUS at 08:45

## 2021-08-10 RX ADMIN — SODIUM CHLORIDE 1000 ML: 900 INJECTION, SOLUTION INTRAVENOUS at 06:14

## 2021-08-10 RX ADMIN — MECLIZINE 25 MG: 12.5 TABLET ORAL at 10:12

## 2021-08-10 RX ADMIN — HUMAN INSULIN 12 UNITS: 100 INJECTION, SUSPENSION SUBCUTANEOUS at 08:44

## 2021-08-10 RX ADMIN — Medication 10 ML: at 08:33

## 2021-08-10 NOTE — DISCHARGE INSTRUCTIONS
Please bring this form with you to show your primary care provider at your follow-up appointment. Primary care provider:  Dr. Doreen Gudino MD    Discharging provider:  Carlitos Hopkins MD    You have been admitted to the hospital with the following diagnoses:  · Dizziness [R42]   · VERTIGO    FOLLOW-UP CARE RECOMMENDATIONS:    APPOINTMENTS:  Follow-up Information     Follow up With Specialties Details Why Contact Info    Doreen Gudino MD Family Medicine In 1 week discharge follow up  [address]  [phone]              FOLLOW-UP TESTS recommended: NONE      PENDING TEST RESULTS:  At the time of your discharge the following test results are still pending: NONE  Please make sure you review these results with your outpatient follow-up provider(s). SYMPTOMS to watch for: chest pain, shortness of breath, fever, chills, nausea, vomiting, diarrhea, change in mentation, falling, weakness, bleeding. DIET/what to eat:  Diabetic Diet    ACTIVITY:  Activity as tolerated, Do NOT drive or operate machinery if you are taking Meclizine or Feeling dizzy    WOUND CARE: NONE    EQUIPMENT needed:  NONE      What to do if new or unexpected symptoms occur? If you experience any of the above symptoms (or should other concerns or questions arise after discharge) please call your primary care physician. Return to the emergency room if you cannot get hold of your doctor. · It is very important that you keep your follow-up appointment(s). · Please bring discharge papers, medication list (and/or medication bottles) to your follow-up appointments for review by your outpatient provider(s). · Please check the list of medications and be sure it includes every medication (even non-prescription medications) that your provider wants you to take. · It is important that you take the medication exactly as they are prescribed.    · Keep your medication in the bottles provided by the pharmacist and keep a list of the medication names, dosages, and times to be taken in your wallet. · Do not take other medications without consulting your doctor. · If you have any questions about your medications or other instructions, please talk to your nurse or care provider before you leave the hospital.    I understand that if any problems occur once I am at home I am to contact my physician. These instructions were explained to me and I had the opportunity to ask questions. Patient Education        Vertigo: Care Instructions  Your Care Instructions     Vertigo is the feeling that you or your surroundings are moving when there is no actual movement. It is often described as a feeling of spinning, whirling, falling, or tilting. Vertigo may make you vomit or feel nauseated. You may have trouble standing or walking and may lose your balance. Vertigo is often related to an inner ear problem, but it can have other more serious causes. If vertigo continues, you may need more tests to find its cause. Follow-up care is a key part of your treatment and safety. Be sure to make and go to all appointments, and call your doctor if you are having problems. It's also a good idea to know your test results and keep a list of the medicines you take. How can you care for yourself at home? · Do not lie flat on your back. Prop yourself up slightly. This may reduce the spinning feeling. Keep your eyes open. · Move slowly so that you do not fall. · If your doctor recommends medicine, take it exactly as directed. · Do not drive while you are having vertigo. Certain exercises, called Galvan-Daroff exercises, can help decrease vertigo. To do Galvan-Daroff exercises:  · Sit on the edge of a bed or sofa and quickly lie down on the side that causes the worst vertigo. Lie on your side with your ear down. · Stay in this position for at least 30 seconds or until the vertigo goes away. · Sit up.  If this causes vertigo, wait for it to stop. · Repeat the procedure on the other side. · Repeat this 10 times. Do these exercises 2 times a day until the vertigo is gone. When should you call for help? Call 911 anytime you think you may need emergency care. For example, call if:    · You passed out (lost consciousness).     · You have symptoms of a stroke. These may include:  ? Sudden numbness, tingling, weakness, or loss of movement in your face, arm, or leg, especially on only one side of your body. ? Sudden vision changes. ? Sudden trouble speaking. ? Sudden confusion or trouble understanding simple statements. ? Sudden problems with walking or balance. ? A sudden, severe headache that is different from past headaches. Call your doctor now or seek immediate medical care if:    · Vertigo occurs with a fever, a headache, or ringing in your ears.     · You have new or increased nausea and vomiting. Watch closely for changes in your health, and be sure to contact your doctor if:    · Vertigo gets worse or happens more often.     · Vertigo has not gotten better after 2 weeks. Where can you learn more? Go to http://www.gray.com/  Enter V121 in the search box to learn more about \"Vertigo: Care Instructions. \"  Current as of: December 2, 2020               Content Version: 12.8  © 2006-2021 [employer]. Care instructions adapted under license by [employer] (which disclaims liability or warranty for this information). If you have questions about a medical condition or this instruction, always ask your healthcare professional. [place] [address] any warranty or liability for your use of this information.

## 2021-08-10 NOTE — ED PROVIDER NOTES
History of diabetes, hyperlipidemia, sleep apnea. He presents accompanied by his wife with complaints of severe dizziness that began approximately 36 hours ago. It was initially severe but has improved somewhat since its onset. He initially had a room spinning sensation but now just feels off balance. He has had difficulty walking. He has had associated nausea and vomiting at times. He denies a history of vertigo. Symptoms are worse with movement. No extremity weakness, tingling, numbness. No change in speech or vision. Past Medical History:   Diagnosis Date    Abscess     Diabetes ([facility] [address]     Hypercholesterolemia     Sleep apnea        History reviewed. No pertinent surgical history. Family History:   Problem Relation Age of Onset    Diabetes Mother     Alcohol abuse Father     No Known Problems Sister     Diabetes Brother     Stroke Brother     No Known Problems Brother     No Known Problems Brother     No Known Problems Sister        Social History     Socioeconomic History    Marital status: SINGLE     Spouse name: Not on file    Number of children: Not on file    Years of education: Not on file    Highest education level: Not on file   Occupational History    Not on file   Tobacco Use    Smoking status: Current Every Day Smoker     Packs/day: 1.00    Smokeless tobacco: Never Used   Substance and Sexual Activity    Alcohol use: Yes     Comment: holidays, several timses a yr    Drug use: Never    Sexual activity: Yes     Partners: Female   Other Topics Concern    Not on file   Social History Narrative    Not on file     Social Determinants of Health     Financial Resource Strain:     Difficulty of Paying Living Expenses:    Food Insecurity:     Worried About Running Out of Food in the Last Year:     Ran Out of Food in the Last Year:    Transportation Needs:     Lack of Transportation (Medical):      Lack of Transportation (Non-Medical):    Physical Activity:     Days of Exercise per Week:     Minutes of Exercise per Session:    Stress:     Feeling of Stress :    Social Connections:     Frequency of Communication with Friends and Family:     Frequency of Social Gatherings with Friends and Family:     Attends Gnosticist Services:     Active Member of Clubs or Organizations:     Attends Club or Organization Meetings:     Marital Status:    Intimate Partner Violence:     Fear of Current or Ex-Partner:     Emotionally Abused:     Physically Abused:     Sexually Abused: ALLERGIES: Patient has no known allergies. Review of Systems   All other systems reviewed and are negative. Vitals:    08/09/21 1649 08/09/21 2104 08/10/21 0124   BP: (!) 153/88 (!) 160/106 (!) 161/90   Pulse: 84 85 67   Resp: 16 16 16   Temp: 97.9 °F (36.6 °C)     SpO2: 96% 97% 98%            Physical Exam  Vitals and nursing note reviewed. Constitutional:       Appearance: He is well-developed. HENT:      Head: Normocephalic and atraumatic. Eyes:      Extraocular Movements: Extraocular movements intact. Conjunctiva/sclera: Conjunctivae normal.      Pupils: Pupils are equal, round, and reactive to light. Comments: Horizontal nystagmus. Neck:      Trachea: No tracheal deviation. Cardiovascular:      Rate and Rhythm: Normal rate and regular rhythm. Heart sounds: Normal heart sounds. No murmur heard. No friction rub. No gallop. Pulmonary:      Effort: Pulmonary effort is normal.      Breath sounds: Normal breath sounds. Abdominal:      Palpations: Abdomen is soft. Tenderness: There is no abdominal tenderness. Musculoskeletal:         General: No deformity. Cervical back: Neck supple. Skin:     General: Skin is warm and dry. Neurological:      General: No focal deficit present. Mental Status: He is alert.       Comments: oriented          [facility]  ED Course as of Aug 10 0139   Tue Aug 10, 2021   0038 Chloride: 100 [RP]      ED Course User Index  [RP] Constance Curran MD       Procedures    EKG: Normal sinus rhythm; rate of 93; nonspecific ST, T wave abnormalities. Willy Mejia MD  1:44 AM    Perfect Serve Consult for Admission  1:44 AM    ED Room Number: FT5/RRE  Patient Name and age:  Jyotsna Wheeler 55 y.o.  male  Working Diagnosis: Severe dizziness  COVID-19 Suspicion:  no  Sepsis present:  no  Reassessment needed: no  Code Status:  Full Code  Readmission: no  Isolation Requirements:  no  Recommended Level of Care:  telemetry  Department:[facility] Adult ED - [phone]  Other: Presents with a 36-hour history of dizziness. He has been off balance and has had trouble walking. It is positional.  He has had nausea and vomiting. He has not had a history of similar symptoms. I am concerned about the possibility of a posterior circulation stroke. Head CT and CTA of his head neck are unremarkable. Consult note: Dr. Jean Paul Shah -will admit.

## 2021-08-10 NOTE — DISCHARGE SUMMARY
Discharge Summary     Patient:  Gabby Perkins       MRN: [id number]       YOB: 1965       Age: 55 y.o. Date of admission:  8/10/2021    Date of discharge:  8/10/2021    Primary care provider: Dr. Sarah Rosen MD    Admitting provider:  Moise Bacon MD    Discharging provider:  Olivia Garrido MD - [phone]  If unavailable, call [phone] and ask the  to page the triage hospitalist.    Consultations  · IP CONSULT TO HOSPITALIST    Procedures  · * No surgery found *    Discharge destination: HOME. The patient is stable for discharge. Admission diagnosis  · Dizziness [R42]    Current Discharge Medication List      START taking these medications    Details   meclizine (ANTIVERT) 25 mg tablet Take 1 tab TID X 2 days then TID PRN  Qty: 30 Tablet, Refills: 0  Start date: 8/10/2021         CONTINUE these medications which have NOT CHANGED    Details   Janumet 50-1,000 mg per tablet TAKE 1 TABLET BY MOUTH TWICE DAILY WITH MEALS  Qty: 60 Tablet, Refills: 0    Comments: Need appointment. Associated Diagnoses: Type 2 diabetes mellitus without complication, with long-term current use of insulin (HCC)      atorvastatin (LIPITOR) 40 mg tablet TAKE 1 TABLET BY MOUTH DAILY  Qty: 90 Tablet, Refills: 0    Comments: Need appointment. Associated Diagnoses: Hyperlipidemia with target LDL less than 70      glipiZIDE (GLUCOTROL) 10 mg tablet TAKE 1 TABLET BY MOUTH TWICE DAILY  Qty: 90 Tab, Refills: 0    Associated Diagnoses: Type 2 diabetes mellitus without complication, with long-term current use of insulin (HCC)      insulin nph-regular human rec (HUMULIN 70-30) 100 unit/mL (70-30) inpn 12 Units by SubCUTAneous route two (2) times daily (with meals).   Qty: 18 Adjustable Dose Pre-filled Pen Syringe, Refills: 3    Associated Diagnoses: Type 2 diabetes mellitus without complication, with long-term current use of insulin (HCC)      Insulin Needles, Disposable, 31 gauge x 5/16\" ndle Use once/day  Qty: 1 Package, Refills: 11    Associated Diagnoses: Type 2 diabetes mellitus without complication, with long-term current use of insulin (HCC)      Insulin Needles, Disposable, (Esther Pen Needle) 32 gauge x 5/32\" ndle 1 Units by SubCUTAneous route daily. Qty: 3 Pen Needle, Refills: 6    Associated Diagnoses: Type 2 diabetes mellitus without complication, with long-term current use of insulin (HCC)      lancets (ONETOUCH DELICA LANCETS) 30 gauge misc Check blood sugar in AM ( fasting ) and 2 hours after dinner. Qty: 100 Lancet, Refills: 11      Blood-Glucose Meter (ONETOUCH ULTRA2 METER) monitoring kit Check blood sugar in AM ( fasting ) and 2 hours after dinner. Qty: 1 Kit, Refills: 0      glucose blood VI test strips (ONETOUCH ULTRA TEST) strip Check blood sugar in AM ( fasting ) and 2 hours after dinner. Qty: 100 Strip, Refills: 11             Follow-up Information     Follow up With Specialties Details Why Contact Info    Augie Cota MD Family Medicine In 1 week discharge follow up  [address] [address]  [phone]            Final discharge diagnoses and brief hospital course  Please also refer to the admission H&P for details on the presenting problem. FOLLOW-UP TESTS recommended: NONE     54 yo male with PMHx of DM, HTN, HLD, admitted for 3 days of dizziness which he describes as room spinning sensation. Patient also states he had vomiting as well. BPPV   - MRI negative for CVA  - Meclinzine TID X 2 days then PRN   - walked with patient, has slightly unsteady gait but feels much better. Discussed patient to be at home when taking Meclizine and avoid driving until he is better.    - if persistent, may need outpatient Vestibular therapy    DM type 2  - c/w Humulin 70/30, Janumet, Glipizide    HLD  - c/w Statins         PENDING TEST RESULTS:  At the time of your discharge the following test results are still pending: NONE  Please make sure you review these results with your outpatient follow-up provider(s).     SYMPTOMS to watch for: chest pain, shortness of breath, fever, chills, nausea, vomiting, diarrhea, change in mentation, falling, weakness, bleeding.     DIET/what to eat:  Diabetic Diet     ACTIVITY:  Activity as tolerated, Do NOT drive or operate machinery if you are taking Meclizine or Feeling dizzy     WOUND CARE: NONE     EQUIPMENT needed:  NONE    Physical examination at discharge  Visit Vitals  BP (!) 145/106   Pulse 67   Temp 97.9 °F (36.6 °C)   Resp 15   Wt 113.4 kg (250 lb)   SpO2 90%   BMI 39.16 kg/m²       Ao3  Nystagmus on left gaze  Lung Clear  CVS: RRR  Abd: soft NT ND  Ext: no edema      Pertinent imaging studies:    MRI brain:   IMPRESSION  Mild chronic microvascular ischemic change with small remote lacunar  infarctions. There is no intracranial mass, hemorrhage or evidence of acute infarction. No acute intracranial process is demonstrated.             Recent Labs     08/09/21  1743   WBC 11.7*   HGB 16.2   HCT 46.7        Recent Labs     08/10/21  0700 08/09/21  1743   * 134*   K 3.5 4.5   CL 99 100   CO2 30 28   BUN 15 12   CREA 0.96 0.84   * 282*   CA 9.0 9.7   MG 2.1  --    PHOS 3.0  --      Recent Labs     08/10/21  0700 08/09/21  1743   AP 95 108   TP 7.6 8.4*   ALB 3.6 3.8   GLOB 4.0 4.6*     No results for input(s): INR, PTP, APTT, INREXT in the last 72 hours. No results for input(s): FE, TIBC, PSAT, FERR in the last 72 hours. No results for input(s): PH, PCO2, PO2 in the last 72 hours. No results for input(s): CPK, CKMB in the last 72 hours.     No lab exists for component: TROPONINI  No components found for: [place]    Chronic Diagnoses:    Problem List as of 8/10/2021 Date Reviewed: 12/30/2020        Codes Class Noted - Resolved    Dizziness ICD-10-CM: R42  ICD-9-CM: 780.4  8/10/2021 - Present        Obesity, morbid ([facility] [address] ICD-10-CM: E66.01  ICD-9-CM: 278.01  5/1/2018 - Present        Non compliance w medication regimen ICD-10-CM: Z91.14  ICD-9-CM: V15.81  9/30/2016 - Present        Essential hypertension ICD-10-CM: I10  ICD-9-CM: 401.9  2/26/2016 - Present        Hyperlipidemia with target LDL less than 70 ICD-10-CM: E78.5  ICD-9-CM: 272.4  9/28/2015 - Present        Type 2 diabetes mellitus without complication (HCC) BXA-08-GN: E11.9  ICD-9-CM: 250.00  9/28/2015 - Present        Abscess of bursa of left knee ICD-10-CM: M71.062  ICD-9-CM: 727.89  6/10/2013 - Present        Cellulitis ICD-10-CM: L03.90  ICD-9-CM: 682.9  8/21/2012 - Present              Time spent on discharge related activities today greater than 30 minutes.       Signed:  Eddie Conde MD                 Hospitalist, Internal Medicine      Cc: Brie Gold MD

## 2021-08-10 NOTE — PROGRESS NOTES
[address]           8/10/2021      RE: Park Vanegas        To Whom It May Concern,    This is to certify that Emily Hemphill has been under the care of [address] due to acute illness from 8/10/2021 until 8/10/2021. Please excuse him from work until 8/12/21    Park Vanegas may return to work on/after 8/13/21. Please feel free to contact my office ([phone]) if you have any questions or concerns. Thank you for your assistance in this matter.     Sincerely,            Raina Mcguire M.D  Internal Medicine

## 2021-08-10 NOTE — PROGRESS NOTES
Care Management -     Patient in observation status and being held in ED. Patient has a discharge order. Met with patient in room. He confirmed his address, phone number, and emergency contact. Patient is independent with his ADLs and works. Family is coming to pick him up. Observation notice provided in writing to patient and/or caregiver as well as verbal explanation of the policy. Patients who are in outpatient status also receive the Observation notice. Patient has received notice and or patient representative has received via secure email, fax, or certified mail based on patient representative's preference.      Saunderskiah Robins MSW

## 2021-08-10 NOTE — H&P
History & Physical    Primary Care Provider: Brie Gold MD  Source of Information: Patient and chart review    History of Presenting Illness:   Jovany Russell is a 55 y.o. male with past medical history of insulin-dependent type 2 diabetes, dyslipidemia,? Hypertension, tobacco use disorder, morbid obesity who presents to hospital with chief complaint of dizziness. Patient endorses 3 days of continued dizziness which she describes as a sensation of the room spinning with him losing his balance. Symptoms have been unchanged since onset. Persistent. He has had no associated head trauma and denies any previous history of dizziness or vertigo. Denies any other neurological symptoms to include headache, vision changes, sensory deficits. He has had no recent travel or sick contacts. He denies any previous history of CVA. Vitals on ER presentation remarkable for BP of 153/88. CBC metabolic panel overall unremarkable with exception of hyperglycemia due to 80s. Review of Systems:  A comprehensive review of systems was negative except for that written in the History of Present Illness. Past Medical History:   Diagnosis Date    Abscess     Diabetes ([facility] [address]     Hypercholesterolemia     Sleep apnea       History reviewed. No pertinent surgical history. Prior to Admission medications    Medication Sig Start Date End Date Taking? Authorizing Provider   Janumet 50-1,000 mg per tablet TAKE 1 TABLET BY MOUTH TWICE DAILY WITH MEALS 8/9/21   Chadwick Freitas MD   atorvastatin (LIPITOR) 40 mg tablet TAKE 1 TABLET BY MOUTH DAILY 7/13/21   Chadwick Freitas MD   glipiZIDE (GLUCOTROL) 10 mg tablet TAKE 1 TABLET BY MOUTH TWICE DAILY 1/6/21   Chadwick Freitas MD   insulin nph-regular human rec (HUMULIN 70-30) 100 unit/mL (70-30) inpn 12 Units by SubCUTAneous route two (2) times daily (with meals).  12/30/20   Chadwick Freitas MD   Insulin Needles, Disposable, 31 gauge x 5/16\" ndle Use once/day 12/30/20   Chadwcik Freitas MD   Insulin Needles, Disposable, (Esther Pen Needle) 32 gauge x 5/32\" ndle 1 Units by SubCUTAneous route daily. 9/30/20   Chadwick Freitas MD   lancets (ONETOUCH DELICA LANCETS) 30 gauge misc Check blood sugar in AM ( fasting ) and 2 hours after dinner. 11/13/19   Chadwick Freitas MD   Blood-Glucose Meter (ONETOUCH ULTRA2 METER) monitoring kit Check blood sugar in AM ( fasting ) and 2 hours after dinner. 11/13/19   Chadwick Freitas MD   glucose blood VI test strips (ONETOUCH ULTRA TEST) strip Check blood sugar in AM ( fasting ) and 2 hours after dinner. 11/13/19   Kinza Freitas MD     No Known Allergies   Family History   Problem Relation Age of Onset    Diabetes Mother     Alcohol abuse Father     No Known Problems Sister     Diabetes Brother     Stroke Brother     No Known Problems Brother     No Known Problems Brother     No Known Problems Sister         SOCIAL HISTORY:  Patient resides:  Independently x   Assisted Living    SNF    With family care       Smoking history:   None x   Former    Chronic      Alcohol history:   None x   Social    Chronic      Ambulates:   Independently x   w/cane    w/walker    w/wc    CODE STATUS:  DNR    Full x   Other      Objective:     Physical Exam:     Visit Vitals  BP (!) 145/106   Pulse 79   Temp 97.9 °F (36.6 °C)   Resp 14   Wt 113.4 kg (250 lb)   SpO2 90%   BMI 39.16 kg/m²      O2 Device: None (Room air)    General:  Alert, cooperative, no distress, appears stated age. Head:  Normocephalic, without obvious abnormality, atraumatic. Eyes:  Conjunctivae/corneas clear. PERRL, EOMs intact. Nose: Nares normal. Septum midline. Mucosa normal.        Neck: Supple, symmetrical, trachea midline, no carotid bruit and no JVD. Lungs:   Clear to auscultation bilaterally. Chest wall:  No tenderness or deformity. Heart:  Regular rate and rhythm, S1, S2 normal, no murmur, click, rub or gallop. Abdomen:   Soft, non-tender.  Bowel sounds normal. No masses,  No organomegaly. Extremities: Extremities normal, atraumatic, no cyanosis or edema. Pulses: 2+ and symmetric all extremities. Skin: Skin color, texture, turgor normal. No rashes or lesions   Neurologic: CNII-XII intact. EKG: Normal sinus rhythm with nonspecific ST and T wave changes  Data Review:     Recent Days:  Recent Labs     08/09/21  1743   WBC 11.7*   HGB 16.2   HCT 46.7        Recent Labs     08/09/21  1743   *   K 4.5      CO2 28   *   BUN 12   CREA 0.84   CA 9.7   ALB 3.8   ALT 24     No results for input(s): PH, PCO2, PO2, HCO3, FIO2 in the last 72 hours. 24 Hour Results:  Recent Results (from the past 24 hour(s))   EKG, 12 LEAD, INITIAL    Collection Time: 08/09/21  5:35 PM   Result Value Ref Range    Ventricular Rate 93 BPM    Atrial Rate 93 BPM    P-R Interval 182 ms    QRS Duration 84 ms    Q-T Interval 344 ms    QTC Calculation (Bezet) 427 ms    Calculated P Axis 63 degrees    Calculated R Axis 5 degrees    Calculated T Axis -27 degrees    Diagnosis       Normal sinus rhythm  Nonspecific T wave abnormality  Abnormal ECG  No previous ECGs available  Confirmed by Danitza Preciado MD ([id number]) on 8/9/2021 8:31:43 PM     CBC WITH AUTOMATED DIFF    Collection Time: 08/09/21  5:43 PM   Result Value Ref Range    WBC 11.7 (H) 4.1 - 11.1 K/uL    RBC 5.21 4.10 - 5.70 M/uL    HGB 16.2 12.1 - 17.0 g/dL    HCT 46.7 36.6 - 50.3 %    MCV 89.6 80.0 - 99.0 FL    MCH 31.1 26.0 - 34.0 PG    MCHC 34.7 30.0 - 36.5 g/dL    RDW 12.0 11.5 - 14.5 %    PLATELET 552 803 - 129 K/uL    MPV 12.2 8.9 - 12.9 FL    NRBC 0.0 0  WBC    ABSOLUTE NRBC 0.00 0.00 - 0.01 K/uL    NEUTROPHILS 80 (H) 32 - 75 %    LYMPHOCYTES 15 12 - 49 %    MONOCYTES 5 5 - 13 %    EOSINOPHILS 0 0 - 7 %    BASOPHILS 0 0 - 1 %    IMMATURE GRANULOCYTES 0 0.0 - 0.5 %    ABS. NEUTROPHILS 9.3 (H) 1.8 - 8.0 K/UL    ABS. LYMPHOCYTES 1.8 0.8 - 3.5 K/UL    ABS. MONOCYTES 0.6 0.0 - 1.0 K/UL    ABS.  EOSINOPHILS 0.0 0.0 - 0.4 K/UL    ABS. BASOPHILS 0.0 0.0 - 0.1 K/UL    ABS. IMM. GRANS. 0.0 0.00 - 0.04 K/UL    DF AUTOMATED     METABOLIC PANEL, COMPREHENSIVE    Collection Time: 08/09/21  5:43 PM   Result Value Ref Range    Sodium 134 (L) 136 - 145 mmol/L    Potassium 4.5 3.5 - 5.1 mmol/L    Chloride 100 97 - 108 mmol/L    CO2 28 21 - 32 mmol/L    Anion gap 6 5 - 15 mmol/L    Glucose 282 (H) 65 - 100 mg/dL    BUN 12 6 - 20 MG/DL    Creatinine 0.84 0.70 - 1.30 MG/DL    BUN/Creatinine ratio 14 12 - 20      GFR est AA >60 >60 ml/min/1.73m2    GFR est non-AA >60 >60 ml/min/1.73m2    Calcium 9.7 8.5 - 10.1 MG/DL    Bilirubin, total 0.5 0.2 - 1.0 MG/DL    ALT (SGPT) 24 12 - 78 U/L    AST (SGOT) 16 15 - 37 U/L    Alk. phosphatase 108 45 - 117 U/L    Protein, total 8.4 (H) 6.4 - 8.2 g/dL    Albumin 3.8 3.5 - 5.0 g/dL    Globulin 4.6 (H) 2.0 - 4.0 g/dL    A-G Ratio 0.8 (L) 1.1 - 2.2     SAMPLES BEING HELD    Collection Time: 08/09/21  5:43 PM   Result Value Ref Range    SAMPLES BEING HELD 1RED     COMMENT        Add-on orders for these samples will be processed based on acceptable specimen integrity and analyte stability, which may vary by analyte. Imaging:     Assessment:     Iva Queen is a 55 y.o. male with past medical history of insulin-dependent type 2 diabetes, dyslipidemia,? Hypertension, tobacco use disorder, morbid obesity who is admitted for dizziness/vertigo. Plan:       Vertigo  -Patient with multiple risk factors concerning for posterior circulation stroke  -Admit to and monitor on remote telemetry  -MRI brain, TSH, A1c, lipid panel, B12, folate, carotid Dopplers  -Trial of meclizine if work-up unremarkable  -PT, OT, neurology consult    Insulin-dependent type 2 diabetes  -NPH 12 units twice daily  -SSI plus hypoglycemic protocols.     Hypertension  -Not on any chronic maintenance therapy  -Recommended low-dose CCB  -Patient to consider    Dyslipidemia  -Repeat lipid panel  -Continue Lipitor    Tobacco use disorder  -Counseled extensively on tobacco cessation    Obesity  -Counseled on weight loss, diet and exercise        FEN/GI -  1L NS Bolus  Activity - as tolerated  DVT prophylaxis - SCDs  GI prophylaxis -  NI  Disposition - Home    CODE STATUS:  Full code       Signed By: Aisha Freitas MD     August 10, 2021

## 2021-08-16 ENCOUNTER — HOSPITAL ENCOUNTER (OUTPATIENT)
Age: 56
Setting detail: OBSERVATION
Discharge: LEFT BEFORE DISCHARGE | End: 2021-08-17
Attending: EMERGENCY MEDICINE | Admitting: FAMILY MEDICINE
Payer: COMMERCIAL

## 2021-08-16 ENCOUNTER — APPOINTMENT (OUTPATIENT)
Dept: CT IMAGING | Age: 56
End: 2021-08-16
Attending: EMERGENCY MEDICINE
Payer: COMMERCIAL

## 2021-08-16 DIAGNOSIS — R42 VERTIGO: ICD-10-CM

## 2021-08-16 DIAGNOSIS — R53.1 LEFT-SIDED WEAKNESS: Primary | ICD-10-CM

## 2021-08-16 DIAGNOSIS — I63.9 CEREBROVASCULAR ACCIDENT (CVA), UNSPECIFIED MECHANISM (HCC): ICD-10-CM

## 2021-08-16 LAB
ALBUMIN SERPL-MCNC: 3.4 G/DL (ref 3.5–5)
ALBUMIN/GLOB SERPL: 0.8 {RATIO} (ref 1.1–2.2)
ALP SERPL-CCNC: 109 U/L (ref 45–117)
ALT SERPL-CCNC: 30 U/L (ref 12–78)
ANION GAP SERPL CALC-SCNC: 5 MMOL/L (ref 5–15)
AST SERPL-CCNC: 18 U/L (ref 15–37)
ATRIAL RATE: 91 BPM
BASOPHILS # BLD: 0.1 K/UL (ref 0–0.1)
BASOPHILS NFR BLD: 1 % (ref 0–1)
BILIRUB SERPL-MCNC: 0.5 MG/DL (ref 0.2–1)
BUN SERPL-MCNC: 6 MG/DL (ref 6–20)
BUN/CREAT SERPL: 7 (ref 12–20)
CALCIUM SERPL-MCNC: 9.3 MG/DL (ref 8.5–10.1)
CALCULATED P AXIS, ECG09: 61 DEGREES
CALCULATED R AXIS, ECG10: 10 DEGREES
CALCULATED T AXIS, ECG11: -20 DEGREES
CHLORIDE SERPL-SCNC: 103 MMOL/L (ref 97–108)
CO2 SERPL-SCNC: 29 MMOL/L (ref 21–32)
COMMENT, HOLDF: NORMAL
COVID-19 RAPID TEST, COVR: NOT DETECTED
CREAT SERPL-MCNC: 0.88 MG/DL (ref 0.7–1.3)
DIAGNOSIS, 93000: NORMAL
DIFFERENTIAL METHOD BLD: NORMAL
EOSINOPHIL # BLD: 0.1 K/UL (ref 0–0.4)
EOSINOPHIL NFR BLD: 2 % (ref 0–7)
ERYTHROCYTE [DISTWIDTH] IN BLOOD BY AUTOMATED COUNT: 11.8 % (ref 11.5–14.5)
EST. AVERAGE GLUCOSE BLD GHB EST-MCNC: 275 MG/DL
GLOBULIN SER CALC-MCNC: 4.5 G/DL (ref 2–4)
GLUCOSE BLD STRIP.AUTO-MCNC: 276 MG/DL (ref 65–117)
GLUCOSE BLD STRIP.AUTO-MCNC: 296 MG/DL (ref 65–117)
GLUCOSE BLD STRIP.AUTO-MCNC: 311 MG/DL (ref 65–117)
GLUCOSE SERPL-MCNC: 304 MG/DL (ref 65–100)
HBA1C MFR BLD: 11.2 % (ref 4–5.6)
HCT VFR BLD AUTO: 43.4 % (ref 36.6–50.3)
HGB BLD-MCNC: 14.9 G/DL (ref 12.1–17)
IMM GRANULOCYTES # BLD AUTO: 0 K/UL (ref 0–0.04)
IMM GRANULOCYTES NFR BLD AUTO: 0 % (ref 0–0.5)
LYMPHOCYTES # BLD: 2.3 K/UL (ref 0.8–3.5)
LYMPHOCYTES NFR BLD: 26 % (ref 12–49)
MCH RBC QN AUTO: 31 PG (ref 26–34)
MCHC RBC AUTO-ENTMCNC: 34.3 G/DL (ref 30–36.5)
MCV RBC AUTO: 90.4 FL (ref 80–99)
MONOCYTES # BLD: 0.6 K/UL (ref 0–1)
MONOCYTES NFR BLD: 7 % (ref 5–13)
NEUTS SEG # BLD: 5.5 K/UL (ref 1.8–8)
NEUTS SEG NFR BLD: 64 % (ref 32–75)
NRBC # BLD: 0 K/UL (ref 0–0.01)
NRBC BLD-RTO: 0 PER 100 WBC
P-R INTERVAL, ECG05: 178 MS
PLATELET # BLD AUTO: 161 K/UL (ref 150–400)
PMV BLD AUTO: 11.6 FL (ref 8.9–12.9)
POTASSIUM SERPL-SCNC: 4 MMOL/L (ref 3.5–5.1)
PROT SERPL-MCNC: 7.9 G/DL (ref 6.4–8.2)
Q-T INTERVAL, ECG07: 348 MS
QRS DURATION, ECG06: 76 MS
QTC CALCULATION (BEZET), ECG08: 428 MS
RBC # BLD AUTO: 4.8 M/UL (ref 4.1–5.7)
SAMPLES BEING HELD,HOLD: NORMAL
SERVICE CMNT-IMP: ABNORMAL
SODIUM SERPL-SCNC: 137 MMOL/L (ref 136–145)
SOURCE, COVRS: NORMAL
TROPONIN I SERPL-MCNC: <0.05 NG/ML
TROPONIN I SERPL-MCNC: <0.05 NG/ML
VENTRICULAR RATE, ECG03: 91 BPM
WBC # BLD AUTO: 8.6 K/UL (ref 4.1–11.1)

## 2021-08-16 PROCEDURE — 74011250637 HC RX REV CODE- 250/637: Performed by: PSYCHIATRY & NEUROLOGY

## 2021-08-16 PROCEDURE — 36415 COLL VENOUS BLD VENIPUNCTURE: CPT

## 2021-08-16 PROCEDURE — 87635 SARS-COV-2 COVID-19 AMP PRB: CPT

## 2021-08-16 PROCEDURE — 83036 HEMOGLOBIN GLYCOSYLATED A1C: CPT

## 2021-08-16 PROCEDURE — 74011250637 HC RX REV CODE- 250/637: Performed by: FAMILY MEDICINE

## 2021-08-16 PROCEDURE — 85025 COMPLETE CBC W/AUTO DIFF WBC: CPT

## 2021-08-16 PROCEDURE — 99284 EMERGENCY DEPT VISIT MOD MDM: CPT

## 2021-08-16 PROCEDURE — 84484 ASSAY OF TROPONIN QUANT: CPT

## 2021-08-16 PROCEDURE — 82962 GLUCOSE BLOOD TEST: CPT

## 2021-08-16 PROCEDURE — 65660000000 HC RM CCU STEPDOWN

## 2021-08-16 PROCEDURE — 74011250636 HC RX REV CODE- 250/636: Performed by: FAMILY MEDICINE

## 2021-08-16 PROCEDURE — 99218 HC RM OBSERVATION: CPT

## 2021-08-16 PROCEDURE — 80053 COMPREHEN METABOLIC PANEL: CPT

## 2021-08-16 PROCEDURE — 93005 ELECTROCARDIOGRAM TRACING: CPT

## 2021-08-16 PROCEDURE — 99222 1ST HOSP IP/OBS MODERATE 55: CPT | Performed by: PSYCHIATRY & NEUROLOGY

## 2021-08-16 PROCEDURE — 74011250637 HC RX REV CODE- 250/637: Performed by: NURSE PRACTITIONER

## 2021-08-16 PROCEDURE — 74011636637 HC RX REV CODE- 636/637: Performed by: FAMILY MEDICINE

## 2021-08-16 PROCEDURE — 70450 CT HEAD/BRAIN W/O DYE: CPT

## 2021-08-16 RX ORDER — INSULIN LISPRO 100 [IU]/ML
INJECTION, SOLUTION INTRAVENOUS; SUBCUTANEOUS
Status: DISCONTINUED | OUTPATIENT
Start: 2021-08-16 | End: 2021-08-17 | Stop reason: HOSPADM

## 2021-08-16 RX ORDER — ALOGLIPTIN 12.5 MG/1
12.5 TABLET, FILM COATED ORAL 2 TIMES DAILY
Status: DISCONTINUED | OUTPATIENT
Start: 2021-08-16 | End: 2021-08-17 | Stop reason: HOSPADM

## 2021-08-16 RX ORDER — ACETAMINOPHEN 650 MG/1
650 SUPPOSITORY RECTAL
Status: DISCONTINUED | OUTPATIENT
Start: 2021-08-16 | End: 2021-08-17 | Stop reason: HOSPADM

## 2021-08-16 RX ORDER — DEXTROSE 50 % IN WATER (D50W) INTRAVENOUS SYRINGE
25-50 AS NEEDED
Status: DISCONTINUED | OUTPATIENT
Start: 2021-08-16 | End: 2021-08-17 | Stop reason: HOSPADM

## 2021-08-16 RX ORDER — CLONIDINE HYDROCHLORIDE 0.2 MG/1
0.2 TABLET ORAL
Status: COMPLETED | OUTPATIENT
Start: 2021-08-16 | End: 2021-08-16

## 2021-08-16 RX ORDER — GUAIFENESIN 100 MG/5ML
81 LIQUID (ML) ORAL DAILY
Status: DISCONTINUED | OUTPATIENT
Start: 2021-08-17 | End: 2021-08-17 | Stop reason: HOSPADM

## 2021-08-16 RX ORDER — MAGNESIUM SULFATE 100 %
4 CRYSTALS MISCELLANEOUS AS NEEDED
Status: DISCONTINUED | OUTPATIENT
Start: 2021-08-16 | End: 2021-08-17 | Stop reason: HOSPADM

## 2021-08-16 RX ORDER — METFORMIN HYDROCHLORIDE 500 MG/1
1000 TABLET ORAL 2 TIMES DAILY WITH MEALS
Status: DISCONTINUED | OUTPATIENT
Start: 2021-08-16 | End: 2021-08-17 | Stop reason: HOSPADM

## 2021-08-16 RX ORDER — INSULIN GLARGINE 100 [IU]/ML
5 INJECTION, SOLUTION SUBCUTANEOUS
Status: DISCONTINUED | OUTPATIENT
Start: 2021-08-16 | End: 2021-08-17 | Stop reason: HOSPADM

## 2021-08-16 RX ORDER — MECLIZINE HCL 12.5 MG 12.5 MG/1
25 TABLET ORAL 3 TIMES DAILY
Status: DISCONTINUED | OUTPATIENT
Start: 2021-08-16 | End: 2021-08-17 | Stop reason: HOSPADM

## 2021-08-16 RX ORDER — GLIPIZIDE 5 MG/1
10 TABLET ORAL
Status: DISCONTINUED | OUTPATIENT
Start: 2021-08-16 | End: 2021-08-17 | Stop reason: HOSPADM

## 2021-08-16 RX ORDER — ATORVASTATIN CALCIUM 40 MG/1
40 TABLET, FILM COATED ORAL
Status: DISCONTINUED | OUTPATIENT
Start: 2021-08-16 | End: 2021-08-17

## 2021-08-16 RX ORDER — ACETAMINOPHEN 325 MG/1
650 TABLET ORAL
Status: DISCONTINUED | OUTPATIENT
Start: 2021-08-16 | End: 2021-08-17 | Stop reason: HOSPADM

## 2021-08-16 RX ORDER — ASPIRIN 325 MG
325 TABLET ORAL ONCE
Status: COMPLETED | OUTPATIENT
Start: 2021-08-16 | End: 2021-08-16

## 2021-08-16 RX ADMIN — ALOGLIPTIN 12.5 MG: 12.5 TABLET, FILM COATED ORAL at 18:56

## 2021-08-16 RX ADMIN — ASPIRIN 325 MG ORAL TABLET 325 MG: 325 PILL ORAL at 18:56

## 2021-08-16 RX ADMIN — MECLIZINE 25 MG: 12.5 TABLET ORAL at 21:24

## 2021-08-16 RX ADMIN — INSULIN LISPRO 4 UNITS: 100 INJECTION, SOLUTION INTRAVENOUS; SUBCUTANEOUS at 21:23

## 2021-08-16 RX ADMIN — METFORMIN HYDROCHLORIDE 1000 MG: 500 TABLET ORAL at 18:56

## 2021-08-16 RX ADMIN — INSULIN LISPRO 3 UNITS: 100 INJECTION, SOLUTION INTRAVENOUS; SUBCUTANEOUS at 14:12

## 2021-08-16 RX ADMIN — INSULIN GLARGINE 5 UNITS: 100 INJECTION, SOLUTION SUBCUTANEOUS at 21:23

## 2021-08-16 RX ADMIN — INSULIN LISPRO 3 UNITS: 100 INJECTION, SOLUTION INTRAVENOUS; SUBCUTANEOUS at 18:55

## 2021-08-16 RX ADMIN — ATORVASTATIN CALCIUM 40 MG: 40 TABLET, FILM COATED ORAL at 21:24

## 2021-08-16 RX ADMIN — CLONIDINE HYDROCHLORIDE 0.2 MG: 0.2 TABLET ORAL at 19:31

## 2021-08-16 NOTE — H&P
History & Physical    Primary Care Provider: Prisca Mcintosh MD  Source of Information: Patient     History of Presenting Illness:   Beatriz Mcclendon is a 55 y.o. male who presents with dizziness    Patient was seen with similar symptoms and evaluated by the hospitalist team and discharged on 8/10/2021 after symptoms improved. Patient reports that after he went home he continued to be dizzy, felt unbalanced and could not walk. Patient had an MRI done of the brain which were essentially negative for acute CVA on his prior admission, his symptoms were considered to be secondary to BPPV and he was started on meclizine which the patient has been taking. Patient denies any other complaints or problems. Patient came to the ER today and was requested to be admitted to the hospitalist service. Patient reports that he has not been vaccinated for COVID-19    The patient denies any Headache, blurry vision, sore throat, trouble swallowing, trouble with speech, chest pain, SOB, cough, fever, chills, N/V/D, abd pain, urinary symptoms, constipation, recent travels, sick contacts  falls, injuries, rashes, contact with COVID-19 diagnosed patients, hematemesis, melena, hemoptysis, hematuria, rashes, denies starting any new medications and denies any other concerns or problems besides as mentioned above. Review of Systems:  A comprehensive review of systems was negative except for that written in the History of Present Illness. All other systems reviewed, pertinent positives and negatives noted in HPI    Past Medical History:   Diagnosis Date    Abscess     Diabetes ([facility] [address]     Hypercholesterolemia     Sleep apnea     Vertigo       History reviewed. No pertinent surgical history. Prior to Admission medications    Medication Sig Start Date End Date Taking?  Authorizing Provider   meclizine (ANTIVERT) 25 mg tablet Take 1 tab TID X 2 days then TID PRN 8/10/21   Joseline Jimenez MD Janumet 50-1,000 mg per tablet TAKE 1 TABLET BY MOUTH TWICE DAILY WITH MEALS 8/9/21   Chadwick Freitas MD   atorvastatin (LIPITOR) 40 mg tablet TAKE 1 TABLET BY MOUTH DAILY 7/13/21   Chadwick Freitas MD   glipiZIDE (GLUCOTROL) 10 mg tablet TAKE 1 TABLET BY MOUTH TWICE DAILY 1/6/21   Chadwick Freitas MD   insulin nph-regular human rec (HUMULIN 70-30) 100 unit/mL (70-30) inpn 12 Units by SubCUTAneous route two (2) times daily (with meals). 12/30/20   Ky Freitas MD   Insulin Needles, Disposable, 31 gauge x 5/16\" ndle Use once/day 12/30/20   Chadwick Freitas MD   Insulin Needles, Disposable, (Esther Pen Needle) 32 gauge x 5/32\" ndle 1 Units by SubCUTAneous route daily. 9/30/20   Chadwick Freitas MD   lancets (ONETOUCH DELICA LANCETS) 30 gauge misc Check blood sugar in AM ( fasting ) and 2 hours after dinner. 11/13/19   Chadwick Freitas MD   Blood-Glucose Meter (ONETOUCH ULTRA2 METER) monitoring kit Check blood sugar in AM ( fasting ) and 2 hours after dinner. 11/13/19   Chadwick Freitas MD   glucose blood VI test strips (ONETOUCH ULTRA TEST) strip Check blood sugar in AM ( fasting ) and 2 hours after dinner. 11/13/19   Jax Freitas MD     No Known Allergies   Family History   Problem Relation Age of Onset    Diabetes Mother     Alcohol abuse Father     No Known Problems Sister     Diabetes Brother     Stroke Brother     No Known Problems Brother     No Known Problems Brother     No Known Problems Sister       Family history was discussed with the patient, all pertinent and relevant details are mentioned as above, no other pertinent and relevant family history was noted on my discussion with the patient.   Patient specifically denies any history of Gaucher disease in the family  SOCIAL HISTORY:  Patient resides:  Independently x   Assisted Living    SNF    With family care       Smoking history:   None    Former x   Chronic      Alcohol history:   None    Social x   Chronic      Ambulates:   Independently x   w/cane w/walker    w/wc    CODE STATUS:  DNR    Full x   Other      Objective:     Physical Exam:     Visit Vitals  BP (!) 136/97   Pulse 81   Temp 97.8 °F (36.6 °C)   Resp 16   SpO2 94%      O2 Device: None (Room air)    General : alert x 3, awake, no acute distress, resting in bed, pleasant male, appears to be stated age  HEENT: PEERL, EOMI, moist mucus membrane, TM clear  Neck: supple, no JVD, no meningeal signs  Chest: Clear to auscultation bilaterally   CVS: S1 S2 heard, Capillary refill less than 2 seconds  Abd: soft/ Non tender, non distended, BS physiological,   Ext: no clubbing, no cyanosis, no edema, brisk 2+ DP pulses  Neuro/Psych: pleasant mood and affect, CN 2-12 grossly intact, sensory grossly within normal limit, Strength 5/5 in all extremities, DTR 1+ x 4  Skin: warm     EKG: Sinus rhythm with nonspecific ST changes    Data Review:     Recent Days:  Recent Labs     08/16/21  0936   WBC 8.6   HGB 14.9   HCT 43.4        Recent Labs     08/16/21  0936      K 4.0      CO2 29   *   BUN 6   CREA 0.88   CA 9.3   ALB 3.4*   ALT 30     No results for input(s): PH, PCO2, PO2, HCO3, FIO2 in the last 72 hours.     24 Hour Results:  Recent Results (from the past 24 hour(s))   EKG, 12 LEAD, INITIAL    Collection Time: 08/16/21  9:34 AM   Result Value Ref Range    Ventricular Rate 91 BPM    Atrial Rate 91 BPM    P-R Interval 178 ms    QRS Duration 76 ms    Q-T Interval 348 ms    QTC Calculation (Bezet) 428 ms    Calculated P Axis 61 degrees    Calculated R Axis 10 degrees    Calculated T Axis -20 degrees    Diagnosis       Sinus rhythm with occasional premature ventricular complexes  T wave abnormality, consider inferior ischemia  Abnormal ECG  When compared with ECG of 09-AUG-2021 17:35,  premature ventricular complexes are now present  Confirmed by Esperanza Lara, [address] [facility] ([id number]) on 8/16/2021 12:25:29 PM     CBC WITH AUTOMATED DIFF    Collection Time: 08/16/21  9:36 AM   Result Value Ref Range    WBC 8.6 4.1 - 11.1 K/uL    RBC 4.80 4. 10 - 5.70 M/uL    HGB 14.9 12.1 - 17.0 g/dL    HCT 43.4 36.6 - 50.3 %    MCV 90.4 80.0 - 99.0 FL    MCH 31.0 26.0 - 34.0 PG    MCHC 34.3 30.0 - 36.5 g/dL    RDW 11.8 11.5 - 14.5 %    PLATELET 485 586 - 166 K/uL    MPV 11.6 8.9 - 12.9 FL    NRBC 0.0 0  WBC    ABSOLUTE NRBC 0.00 0.00 - 0.01 K/uL    NEUTROPHILS 64 32 - 75 %    LYMPHOCYTES 26 12 - 49 %    MONOCYTES 7 5 - 13 %    EOSINOPHILS 2 0 - 7 %    BASOPHILS 1 0 - 1 %    IMMATURE GRANULOCYTES 0 0.0 - 0.5 %    ABS. NEUTROPHILS 5.5 1.8 - 8.0 K/UL    ABS. LYMPHOCYTES 2.3 0.8 - 3.5 K/UL    ABS. MONOCYTES 0.6 0.0 - 1.0 K/UL    ABS. EOSINOPHILS 0.1 0.0 - 0.4 K/UL    ABS. BASOPHILS 0.1 0.0 - 0.1 K/UL    ABS. IMM. GRANS. 0.0 0.00 - 0.04 K/UL    DF AUTOMATED     METABOLIC PANEL, COMPREHENSIVE    Collection Time: 08/16/21  9:36 AM   Result Value Ref Range    Sodium 137 136 - 145 mmol/L    Potassium 4.0 3.5 - 5.1 mmol/L    Chloride 103 97 - 108 mmol/L    CO2 29 21 - 32 mmol/L    Anion gap 5 5 - 15 mmol/L    Glucose 304 (H) 65 - 100 mg/dL    BUN 6 6 - 20 MG/DL    Creatinine 0.88 0.70 - 1.30 MG/DL    BUN/Creatinine ratio 7 (L) 12 - 20      GFR est AA >60 >60 ml/min/1.73m2    GFR est non-AA >60 >60 ml/min/1.73m2    Calcium 9.3 8.5 - 10.1 MG/DL    Bilirubin, total 0.5 0.2 - 1.0 MG/DL    ALT (SGPT) 30 12 - 78 U/L    AST (SGOT) 18 15 - 37 U/L    Alk. phosphatase 109 45 - 117 U/L    Protein, total 7.9 6.4 - 8.2 g/dL    Albumin 3.4 (L) 3.5 - 5.0 g/dL    Globulin 4.5 (H) 2.0 - 4.0 g/dL    A-G Ratio 0.8 (L) 1.1 - 2.2     SAMPLES BEING HELD    Collection Time: 08/16/21  9:36 AM   Result Value Ref Range    SAMPLES BEING HELD 1RED 1BLU     COMMENT        Add-on orders for these samples will be processed based on acceptable specimen integrity and analyte stability, which may vary by analyte.    TROPONIN I    Collection Time: 08/16/21  9:36 AM   Result Value Ref Range    Troponin-I, Qt. <0.05 <0.05 ng/mL         Imaging:   CT HEAD WO CONT    Result Date: 8/16/2021  No acute process or change compared to the prior exam.     Assessment/Plan     Dizziness persistent  Diabetes mellitus type 2 poorly controlled  Hypertension  Hyperlipidemia      Unclear etiology of dizziness, continue patient on meclizine, PT consult, neurology consult, will get an MRI of the head and neck to rule out any occult disease, IV hydration, fall precautions, supportive care close monitoring and further intervention per hospital course, reassess as needed  Sliding scale NovoLog insulin, Accu-Cheks, diet control, basal dose Lantus, continue oral hypoglycemics, continue to monitor, reassess as needed  Optimize blood pressure control, continue home medication  Continue statin    GI DVT prophylaxis: Patient will be on SCDs           Please note that this dictation was completed with [employer], the computer voice recognition software. Quite often unanticipated grammatical, syntax, homophones, and other interpretive errors are inadvertently transcribed by the computer software. Please disregard these errors. Please excuse any errors that have escaped final proofreading.          Signed By: Melquiades Ordaz MD     August 16, 2021

## 2021-08-16 NOTE — ED TRIAGE NOTES
Pt c/o dizziness and left sided weakness for several days, seen here Sunday for the same , denies headache, denies n/v

## 2021-08-16 NOTE — ED PROVIDER NOTES
55-year-old male with a history of DM, HLD, CADE, with recent admission on 8/10 and diagnosed with what was thought was vertigo, presents to the emergency department noting improvement in his vertiginous symptoms but persistent left-sided weakness precipitating a limp and difficulty using his left arm since his prior admission. Records were reviewed including the MRI which was read showing Mild chronic microvascular ischemic change with small remote lacunar infarctions. There is no intracranial mass, hemorrhage or evidence of acute infarction. No acute intracranial process is demonstrated. He was discharged home with meclizine which he states that he has been taking with reported improvement in his vertiginous type symptoms but no change in his left-sided weakness. Patient is a still has occasional and very transient vertigo type symptoms when he leans far over to the left but states that these only last for a few seconds and seem to improve after he stands back up. Denies any nausea, vomiting, headache, difficulty speaking or numbness, or any other medical concerns. Denies any known history of prior CVAs. Past Medical History:   Diagnosis Date    Abscess     Diabetes ([facility] [address]     Hypercholesterolemia     Sleep apnea     Vertigo        History reviewed. No pertinent surgical history.       Family History:   Problem Relation Age of Onset    Diabetes Mother     Alcohol abuse Father     No Known Problems Sister     Diabetes Brother     Stroke Brother     No Known Problems Brother     No Known Problems Brother     No Known Problems Sister        Social History     Socioeconomic History    Marital status: SINGLE     Spouse name: Not on file    Number of children: Not on file    Years of education: Not on file    Highest education level: Not on file   Occupational History    Not on file   Tobacco Use    Smoking status: Current Every Day Smoker     Packs/day: 1.00    Smokeless tobacco: Never Used   Substance and Sexual Activity    Alcohol use: Yes     Comment: holidays, several timses a yr    Drug use: Never    Sexual activity: Yes     Partners: Female   Other Topics Concern    Not on file   Social History Narrative    Not on file     Social Determinants of Health     Financial Resource Strain:     Difficulty of Paying Living Expenses:    Food Insecurity:     Worried About Running Out of Food in the Last Year:     [address] in the Last Year:    Transportation Needs:     Lack of Transportation (Medical):  Lack of Transportation (Non-Medical):    Physical Activity:     Days of Exercise per Week:     Minutes of Exercise per Session:    Stress:     Feeling of Stress :    Social Connections:     Frequency of Communication with Friends and Family:     Frequency of Social Gatherings with Friends and Family:     Attends Protestant Services:     Active Member of Clubs or Organizations:     Attends Club or Organization Meetings:     Marital Status:    Intimate Partner Violence:     Fear of Current or Ex-Partner:     Emotionally Abused:     Physically Abused:     Sexually Abused: ALLERGIES: Patient has no known allergies. Review of Systems   Constitutional: Positive for activity change. Negative for appetite change, chills and fever. HENT: Negative for congestion, rhinorrhea, sinus pain, sneezing and sore throat. Eyes: Negative for photophobia and visual disturbance. Respiratory: Negative for cough and shortness of breath. Cardiovascular: Negative for chest pain. Gastrointestinal: Negative for abdominal pain, blood in stool, constipation, diarrhea, nausea and vomiting. Genitourinary: Negative for difficulty urinating, dysuria, flank pain, hematuria, penile pain and testicular pain. Musculoskeletal: Positive for gait problem. Negative for arthralgias, back pain, myalgias and neck pain. Skin: Negative for rash and wound.    Neurological: Positive for dizziness and weakness. Negative for syncope, facial asymmetry, speech difficulty, light-headedness, numbness and headaches. Psychiatric/Behavioral: Negative for self-injury and suicidal ideas. All other systems reviewed and are negative. Vitals:    08/16/21 0928   BP: (!) 155/99   Pulse: 83   Resp: 16   Temp: 97.8 °F (36.6 °C)   SpO2: 96%            Physical Exam  Vitals and nursing note reviewed. Constitutional:       General: He is not in acute distress. Appearance: Normal appearance. He is well-developed. He is obese. He is not diaphoretic. HENT:      Head: Normocephalic and atraumatic. Nose: Nose normal.   Eyes:      Extraocular Movements: Extraocular movements intact. Conjunctiva/sclera: Conjunctivae normal.      Pupils: Pupils are equal, round, and reactive to light. Cardiovascular:      Rate and Rhythm: Normal rate and regular rhythm. Heart sounds: Normal heart sounds. Pulmonary:      Effort: Pulmonary effort is normal.      Breath sounds: Normal breath sounds. Abdominal:      General: There is no distension. Palpations: Abdomen is soft. Tenderness: There is no abdominal tenderness. Musculoskeletal:         General: No tenderness. Cervical back: Neck supple. Skin:     General: Skin is warm and dry. Neurological:      General: No focal deficit present. Mental Status: He is alert and oriented to person, place, and time. GCS: GCS eye subscore is 4. GCS verbal subscore is 5. GCS motor subscore is 6. Cranial Nerves: No cranial nerve deficit. Sensory: No sensory deficit. Motor: Weakness present. Coordination: Coordination normal.      Gait: Gait abnormal.      Comments: Mild nystagmus noted bilaterally. No facial droop, fluent speech. GCS 15.  4/5 strength in left hemibody and 5/5 strength in right hemibody. Ambulatory with noticeable limp secondary to left-sided weakness. Intact sensation.           MDM   78-year-old male with recent admission for vertigo presents with persistent left-sided weakness but improved intermittent vertiginous type symptoms. Concern for CVA that was missed on prior work-up. He is afebrile with vital signs stable no acute distress. Labs returned showing hyperglycemia with glucose 304, otherwise reassuring, normal renal function, bicarb, anion gap. CT head shows no acute intracranial abnormalities. Given left-sided weakness persistent over the last week despite improvement in vertiginous symptoms concern for new subacute CVA. Will admit to the hospitalist service for further care and assessment. Procedures      Perfect Serve Consult for Admission  11:31 AM    ED Room Number: ER09/09  Patient Name and age:  Lanny Kaplan 55 y.o.  male  Working Diagnosis:   1. Left-sided weakness    2. Vertigo    3. Cerebrovascular accident (CVA), unspecified mechanism ([address]        COVID-19 Suspicion:  no  Sepsis present:  no  Reassessment needed: no  Code Status:  Full Code  Readmission: yes  Isolation Requirements:  no  Recommended Level of Care:  telemetry  Department:[facility] Adult ED - 21   Other: 67-year-old male with recent admission last week for vertigo with MRI that showed no clear acute issues but did show prior lacunar infarcts, vertigo symptoms have improved since his discharge but left-sided weakness has persisted. Concerned that he has actually had a stroke.  Has risk factors

## 2021-08-16 NOTE — ROUTINE PROCESS
TRANSFER - OUT REPORT:    Verbal report given to Claritza (name) on Bayron Willams  being transferred to [facility] (unit) for routine progression of care       Report consisted of patients Situation, Background, Assessment and   Recommendations(SBAR). Information from the following report(s) SBAR, Kardex, ED Summary, Intake/Output, MAR and Recent Results was reviewed with the receiving nurse. Lines:   Peripheral IV 08/16/21 Left Antecubital (Active)   Site Assessment Clean, dry, & intact 08/16/21 0940   Phlebitis Assessment 0 08/16/21 0940   Infiltration Assessment 0 08/16/21 0940   Dressing Status Clean, dry, & intact 08/16/21 0940   Dressing Type Transparent 08/16/21 0940   Hub Color/Line Status Pink;Flushed;Patent 08/16/21 0940   Action Taken Blood drawn 08/16/21 0940        Opportunity for questions and clarification was provided.       Patient transported with:   [employer]

## 2021-08-16 NOTE — CONSULTS
Consult dictated. 42-year-old with multiple vascular risk factors who was admitted a week ago with symptoms of dizziness and unsteadiness of gait. MRI brain negative at that time. Now coming in with persistence of unsteadiness and weakness in the left upper extremity. Suspect a small vessel lacunar infarct. Repeat MRI brain. Check echocardiogram.  Start aspirin.    Richard Velasco MD

## 2021-08-16 NOTE — ED NOTES
Verbal shift change report given to Hilario 97 (oncoming nurse) by [address] (offgoing nurse). Report included the following information SBAR, Kardex, ED Summary, [facility] and Recent Results.

## 2021-08-16 NOTE — ED NOTES
Bedside and Verbal shift change report given to Dejan Baca RN (oncoming nurse) by Mercy Blair RN (offgoing nurse). Report included the following information SBAR, ED Summary, MAR and Recent Results.

## 2021-08-16 NOTE — PROGRESS NOTES
Verbal bedside report given to Kurt RN oncoming nurse by Hazel Barker. Greg Dawkins RN off-going nurse. Report included current pt status and condition, recent results, hx of present illness, heart rate and rhythm, and respiratory status. Primary Nurse Sera Wright RN and Mady Martin RN and Kaylen Bobby RN performed a dual skin assessment on this patient No impairment noted    TRANSFER - IN REPORT:    Verbal report received from [facility] (name) on Torres Hutton  being received from ED (unit) for routine progression of care      Report consisted of patients Situation, Background, Assessment and   Recommendations(SBAR). Information from the following report(s) SBAR, ED Summary and MAR was reviewed with the receiving nurse. Opportunity for questions and clarification was provided. Assessment completed upon patients arrival to unit and care assumed.

## 2021-08-17 ENCOUNTER — APPOINTMENT (OUTPATIENT)
Dept: MRI IMAGING | Age: 56
End: 2021-08-17
Attending: PSYCHIATRY & NEUROLOGY
Payer: COMMERCIAL

## 2021-08-17 ENCOUNTER — APPOINTMENT (OUTPATIENT)
Dept: NON INVASIVE DIAGNOSTICS | Age: 56
End: 2021-08-17
Attending: PSYCHIATRY & NEUROLOGY
Payer: COMMERCIAL

## 2021-08-17 VITALS
BODY MASS INDEX: 36.57 KG/M2 | WEIGHT: 233 LBS | OXYGEN SATURATION: 100 % | RESPIRATION RATE: 13 BRPM | HEIGHT: 67 IN | SYSTOLIC BLOOD PRESSURE: 134 MMHG | TEMPERATURE: 97.9 F | HEART RATE: 68 BPM | DIASTOLIC BLOOD PRESSURE: 73 MMHG

## 2021-08-17 LAB
ECHO AO ROOT DIAM: 3.92 CM
ECHO AV AREA PEAK VELOCITY: 3.81 CM2
ECHO AV AREA/BSA PEAK VELOCITY: 1.8 CM2/M2
ECHO AV PEAK GRADIENT: 3.87 MMHG
ECHO AV PEAK VELOCITY: 98.31 CM/S
ECHO LA AREA 4C: 20.48 CM2
ECHO LA MAJOR AXIS: 4.74 CM
ECHO LA MINOR AXIS: 2.19 CM
ECHO LA VOL 2C: 65.11 ML (ref 18–58)
ECHO LA VOL 4C: 57.15 ML (ref 18–58)
ECHO LA VOL BP: 69.56 ML (ref 18–58)
ECHO LA VOL/BSA BIPLANE: 32.2 ML/M2 (ref 16–28)
ECHO LA VOLUME INDEX A2C: 30.14 ML/M2 (ref 16–28)
ECHO LA VOLUME INDEX A4C: 26.46 ML/M2 (ref 16–28)
ECHO LV E' LATERAL VELOCITY: 4.33 CM/S
ECHO LV E' SEPTAL VELOCITY: 4.27 CM/S
ECHO LV EDV A2C: 130.31 ML
ECHO LV EDV A4C: 109.36 ML
ECHO LV EDV BP: 121.18 ML (ref 67–155)
ECHO LV EDV INDEX A4C: 50.6 ML/M2
ECHO LV EDV INDEX BP: 56.1 ML/M2
ECHO LV EDV NDEX A2C: 60.3 ML/M2
ECHO LV EJECTION FRACTION A2C: 56 PERCENT
ECHO LV EJECTION FRACTION A4C: 57 PERCENT
ECHO LV EJECTION FRACTION BIPLANE: 55.7 PERCENT (ref 55–100)
ECHO LV ESV A2C: 57.84 ML
ECHO LV ESV A4C: 47.36 ML
ECHO LV ESV BP: 53.67 ML (ref 22–58)
ECHO LV ESV INDEX A2C: 26.8 ML/M2
ECHO LV ESV INDEX A4C: 21.9 ML/M2
ECHO LV ESV INDEX BP: 24.8 ML/M2
ECHO LV INTERNAL DIMENSION DIASTOLIC: 5.16 CM (ref 4.2–5.9)
ECHO LV INTERNAL DIMENSION SYSTOLIC: 3.66 CM
ECHO LV IVSD: 1.01 CM (ref 0.6–1)
ECHO LV MASS 2D: 202 G (ref 88–224)
ECHO LV MASS INDEX 2D: 93.5 G/M2 (ref 49–115)
ECHO LV POSTERIOR WALL DIASTOLIC: 1.07 CM (ref 0.6–1)
ECHO LVOT DIAM: 2.41 CM
ECHO LVOT PEAK GRADIENT: 2.71 MMHG
ECHO LVOT PEAK VELOCITY: 82.38 CM/S
ECHO MV A VELOCITY: 69.62 CM/S
ECHO MV AREA PHT: 2.74 CM2
ECHO MV E DECELERATION TIME (DT): 277.25 MS
ECHO MV E VELOCITY: 52.07 CM/S
ECHO MV E/A RATIO: 0.75
ECHO MV E/E' LATERAL: 12.03
ECHO MV E/E' RATIO (AVERAGED): 12.11
ECHO MV E/E' SEPTAL: 12.19
ECHO MV PRESSURE HALF TIME (PHT): 80.4 MS
ECHO PV PEAK INSTANTANEOUS GRADIENT SYSTOLIC: 1.77 MMHG
ECHO RV INTERNAL DIMENSION: 3.11 CM
ECHO RV TAPSE: 1.98 CM (ref 1.5–2)
ECHO TV REGURGITANT MAX VELOCITY: 266.66 CM/S
ECHO TV REGURGITANT PEAK GRADIENT: 28.44 MMHG
GLUCOSE BLD STRIP.AUTO-MCNC: 265 MG/DL (ref 65–117)
SERVICE CMNT-IMP: ABNORMAL

## 2021-08-17 PROCEDURE — 99233 SBSQ HOSP IP/OBS HIGH 50: CPT | Performed by: NURSE PRACTITIONER

## 2021-08-17 PROCEDURE — 99218 HC RM OBSERVATION: CPT

## 2021-08-17 PROCEDURE — 74011250636 HC RX REV CODE- 250/636: Performed by: FAMILY MEDICINE

## 2021-08-17 PROCEDURE — 74011636637 HC RX REV CODE- 636/637: Performed by: FAMILY MEDICINE

## 2021-08-17 PROCEDURE — 93306 TTE W/DOPPLER COMPLETE: CPT | Performed by: SPECIALIST

## 2021-08-17 PROCEDURE — 74011250637 HC RX REV CODE- 250/637: Performed by: FAMILY MEDICINE

## 2021-08-17 PROCEDURE — 96374 THER/PROPH/DIAG INJ IV PUSH: CPT

## 2021-08-17 PROCEDURE — 82962 GLUCOSE BLOOD TEST: CPT

## 2021-08-17 RX ORDER — ATORVASTATIN CALCIUM 40 MG/1
80 TABLET, FILM COATED ORAL
Status: DISCONTINUED | OUTPATIENT
Start: 2021-08-17 | End: 2021-08-17 | Stop reason: HOSPADM

## 2021-08-17 RX ORDER — LORAZEPAM 2 MG/ML
1 INJECTION INTRAMUSCULAR
Status: DISCONTINUED | OUTPATIENT
Start: 2021-08-17 | End: 2021-08-17 | Stop reason: HOSPADM

## 2021-08-17 RX ADMIN — MECLIZINE 25 MG: 12.5 TABLET ORAL at 09:56

## 2021-08-17 RX ADMIN — INSULIN LISPRO 4 UNITS: 100 INJECTION, SOLUTION INTRAVENOUS; SUBCUTANEOUS at 09:57

## 2021-08-17 RX ADMIN — GLIPIZIDE 10 MG: 5 TABLET ORAL at 09:56

## 2021-08-17 RX ADMIN — ASPIRIN 81 MG: 81 TABLET, CHEWABLE ORAL at 09:56

## 2021-08-17 RX ADMIN — METFORMIN HYDROCHLORIDE 1000 MG: 500 TABLET ORAL at 09:56

## 2021-08-17 NOTE — PROGRESS NOTES
Problem: Diabetes Self-Management  Goal: *Disease process and treatment process  Description: Define diabetes and identify own type of diabetes; list 3 options for treating diabetes. Outcome: Progressing Towards Goal  Goal: *Incorporating nutritional management into lifestyle  Description: Describe effect of type, amount and timing of food on blood glucose; list 3 methods for planning meals. Outcome: Progressing Towards Goal  Goal: *Incorporating physical activity into lifestyle  Description: State effect of exercise on blood glucose levels. Outcome: Progressing Towards Goal  Goal: *Developing strategies to promote health/change behavior  Description: Define the ABC's of diabetes; identify appropriate screenings, schedule and personal plan for screenings. Outcome: Progressing Towards Goal  Goal: *Using medications safely  Description: State effect of diabetes medications on diabetes; name diabetes medication taking, action and side effects. Outcome: Progressing Towards Goal  Goal: *Monitoring blood glucose, interpreting and using results  Description: Identify recommended blood glucose targets  and personal targets. Outcome: Progressing Towards Goal  Goal: *Prevention, detection, treatment of acute complications  Description: List symptoms of hyper- and hypoglycemia; describe how to treat low blood sugar and actions for lowering  high blood glucose level. Outcome: Progressing Towards Goal  Goal: *Prevention, detection and treatment of chronic complications  Description: Define the natural course of diabetes and describe the relationship of blood glucose levels to long term complications of diabetes.   Outcome: Progressing Towards Goal  Goal: *Developing strategies to address psychosocial issues  Description: Describe feelings about living with diabetes; identify support needed and support network  Outcome: Progressing Towards Goal  Goal: *Insulin pump training  Outcome: Progressing Towards Goal  Goal: *Sick day guidelines  Outcome: Progressing Towards Goal  Goal: *Patient Specific Goal (EDIT GOAL, INSERT TEXT)  Outcome: Progressing Towards Goal     Problem: Patient Education: Go to Patient Education Activity  Goal: Patient/Family Education  Outcome: Progressing Towards Goal     Problem: Falls - Risk of  Goal: *Absence of Falls  Description: Document Phillip Colvin Fall Risk and appropriate interventions in the flowsheet.   Outcome: Progressing Towards Goal  Note: Fall Risk Interventions:  Mobility Interventions: Assess mobility with egress test, Patient to call before getting OOB, PT Consult for mobility concerns         Medication Interventions: Assess postural VS orthostatic hypotension, Patient to call before getting OOB, Teach patient to arise slowly                   Problem: Patient Education: Go to Patient Education Activity  Goal: Patient/Family Education  Outcome: Progressing Towards Goal     Problem: Patient Education: Go to Patient Education Activity  Goal: Patient/Family Education  Outcome: Progressing Towards Goal     Problem: TIA/CVA Stroke: 0-24 hours  Goal: Off Pathway (Use only if patient is Off Pathway)  Outcome: Progressing Towards Goal  Goal: Activity/Safety  Outcome: Progressing Towards Goal  Goal: Consults, if ordered  Outcome: Progressing Towards Goal  Goal: Diagnostic Test/Procedures  Outcome: Progressing Towards Goal  Goal: Nutrition/Diet  Outcome: Progressing Towards Goal  Goal: Discharge Planning  Outcome: Progressing Towards Goal  Goal: Medications  Outcome: Progressing Towards Goal  Goal: Respiratory  Outcome: Progressing Towards Goal  Goal: Treatments/Interventions/Procedures  Outcome: Progressing Towards Goal  Goal: Minimize risk of bleeding post-thrombolytic infusion  Outcome: Progressing Towards Goal  Goal: Monitor for complications post-thrombolytic infusion  Outcome: Progressing Towards Goal  Goal: Psychosocial  Outcome: Progressing Towards Goal  Goal: *Hemodynamically stable  Outcome: Progressing Towards Goal  Goal: *Neurologically stable  Description: Absence of additional neurological deficits    Outcome: Progressing Towards Goal  Goal: *Verbalizes anxiety and depression are reduced or absent  Outcome: Progressing Towards Goal  Goal: *Absence of Signs of Aspiration on Current Diet  Outcome: Progressing Towards Goal  Goal: *Absence of deep venous thrombosis signs and symptoms(Stroke Metric)  Outcome: Progressing Towards Goal  Goal: *Ability to perform ADLs and demonstrates progressive mobility and function  Outcome: Progressing Towards Goal  Goal: *Stroke education started(Stroke Metric)  Outcome: Progressing Towards Goal  Goal: *Dysphagia screen performed(Stroke Metric)  Outcome: Progressing Towards Goal  Goal: *Rehab consulted(Stroke Metric)  Outcome: Progressing Towards Goal     Problem: TIA/CVA Stroke: Day 2 Until Discharge  Goal: Off Pathway (Use only if patient is Off Pathway)  Outcome: Progressing Towards Goal  Goal: Activity/Safety  Outcome: Progressing Towards Goal  Goal: Diagnostic Test/Procedures  Outcome: Progressing Towards Goal  Goal: Nutrition/Diet  Outcome: Progressing Towards Goal  Goal: Discharge Planning  Outcome: Progressing Towards Goal  Goal: Medications  Outcome: Progressing Towards Goal  Goal: Respiratory  Outcome: Progressing Towards Goal  Goal: Treatments/Interventions/Procedures  Outcome: Progressing Towards Goal  Goal: Psychosocial  Outcome: Progressing Towards Goal  Goal: *Verbalizes anxiety and depression are reduced or absent  Outcome: Progressing Towards Goal  Goal: *Absence of aspiration  Outcome: Progressing Towards Goal  Goal: *Absence of deep venous thrombosis signs and symptoms(Stroke Metric)  Outcome: Progressing Towards Goal  Goal: *Optimal pain control at patient's stated goal  Outcome: Progressing Towards Goal  Goal: *Tolerating diet  Outcome: Progressing Towards Goal  Goal: *Ability to perform ADLs and demonstrates progressive mobility and function  Outcome: Progressing Towards Goal  Goal: *Stroke education continued(Stroke Metric)  Outcome: Progressing Towards Goal     Problem: Ischemic Stroke: Discharge Outcomes  Goal: *Verbalizes anxiety and depression are reduced or absent  Outcome: Progressing Towards Goal  Goal: *Verbalize understanding of risk factor modification(Stroke Metric)  Outcome: Progressing Towards Goal  Goal: *Hemodynamically stable  Outcome: Progressing Towards Goal  Goal: *Absence of aspiration pneumonia  Outcome: Progressing Towards Goal  Goal: *Aware of needed dietary changes  Outcome: Progressing Towards Goal  Goal: *Verbalize understanding of prescribed medications including anti-coagulants, anti-lipid, and/or anti-platelets(Stroke Metric)  Outcome: Progressing Towards Goal  Goal: *Tolerating diet  Outcome: Progressing Towards Goal  Goal: *Aware of follow-up diagnostics related to anticoagulants  Outcome: Progressing Towards Goal  Goal: *Ability to perform ADLs and demonstrates progressive mobility and function  Outcome: Progressing Towards Goal  Goal: *Absence of DVT(Stroke Metric)  Outcome: Progressing Towards Goal  Goal: *Absence of aspiration  Outcome: Progressing Towards Goal  Goal: *Optimal pain control at patient's stated goal  Outcome: Progressing Towards Goal  Goal: *Home safety concerns addressed  Outcome: Progressing Towards Goal  Goal: *Describes available resources and support systems  Outcome: Progressing Towards Goal  Goal: *Verbalizes understanding of activation of EMS(911) for stroke symptoms(Stroke Metric)  Outcome: Progressing Towards Goal  Goal: *Understands and describes signs and symptoms to report to providers(Stroke Metric)  Outcome: Progressing Towards Goal  Goal: *Neurolgocially stable (absence of additional neurological deficits)  Outcome: Progressing Towards Goal  Goal: *Verbalizes importance of follow-up with primary care physician(Stroke Metric)  Outcome: Progressing Towards Goal  Goal: *Smoking cessation discussed,if applicable(Stroke Metric)  Outcome: Progressing Towards Goal  Goal: *Depression screening completed(Stroke Metric)  Outcome: Progressing Towards Goal

## 2021-08-17 NOTE — PROGRESS NOTES
RN notified that patient was walking off of the unit. RN went to the first floor and witnessed patient exiting the hospital. RN removed the IV. Patient refused to sign AMA paperwork.

## 2021-08-17 NOTE — CONSULTS
[address]    Name:  Dimas Garcia  MR#:  [id number]  :  1965  ACCOUNT #:  [de-identified]  DATE OF SERVICE:  2021    REQUESTING PHYSICIAN:  Dr. Abdirahman Aguilera. REASON FOR EVALUATION:  Stroke. HISTORY OF PRESENT ILLNESS:  The patient is a 80-year-old male with history of diabetes, hypertension, obstructive sleep apnea, smoking, who was admitted to this hospital a week ago with symptoms of dizziness/room spinning sensation and unsteadiness of gait. He had workup done including MRI of the brain, which did not show anything acute. He was treated symptomatically with meclizine and discharged home. However, his symptoms of unsteadiness did not improve, and he has been having difficulty using his left arm and it feels incoordinated. He came back to the hospital and was readmitted for further workup. Denies any changes in vision, speech, or swallowing. No chest pain, shortness of breath, palpitations. No headache. PAST MEDICAL HISTORY:  As mentioned above. HOME MEDICATIONS:  Janumet, atorvastatin, glipizide, insulin. ALLERGIES:  NONE. SOCIAL HISTORY:  Lives independently. Former smoker. Socially drinks alcohol. PHYSICAL EXAMINATION:  GENERAL:  On examination, the patient is alert, fully oriented. VITAL SIGNS:  Blood pressure 126/75, temperature 97.8, pulse is 81. HEART:  Regular rate and rhythm. CHEST:  Clear. ABDOMEN:  Soft, nontender. Positive bowel sounds. EXTREMITIES:  No edema. NEUROLOGIC:  Speech is clear. Comprehension is normal.  Pupils are equal, round, reactive. Extraocular movements are full. Face is symmetric. Tongue is midline. Hearing is baseline. Muscle tone and bulk normal.  Strength normal in both upper and lower extremities. Minimal dysmetria noted in finger-nose-finger testing on the left. DTRs 2/2 symmetric. Toes downgoing. Gait unsteady, and he tends to sway/lean to the left. LABORATORY DATA:  CBC is unremarkable. Chemistries unremarkable. Lipid profile:  Triglycerides 79, HDL 38, LDL 76.2  Troponin less than 0.05. CTA of head and neck on 08/09 did not show any large vessel occlusion or hemodynamically significant stenosis. EKG shows sinus rhythm with occasional PVCs. MRI scan of the brain during his recent admission showed old lacunar infarctions in the left subcortical white matter. ASSESSMENT AND PLAN:  A 81-year-old male with multiple vascular risk factors, who was admitted a week ago with symptoms of dizziness and unsteadiness of gait. MRI of the brain was negative at that time. Now coming in with persistence of unsteadiness and weakness and incoordination in the left upper extremity. Suspect that he has had a small vessel lacunar infarct causing clumsiness and incoordination of his gait on left side. We will need to repeat MRI scan of the brain. Also check echocardiogram.  We will start him on aspirin. Continue statin. We will follow up once MRI is completed. Thank you for this consultation.       Otilia Marrufo MD      [id number]  D:  08/16/2021 17:24  T:  08/16/2021 21:40  JOB #:  [id number]

## 2021-08-17 NOTE — PROGRESS NOTES
Physical Therapy:     Orders received, chart reviewed, discussed with RN in prep for PT eval.  Per rn, pt agitated, pulling off leads, dressed, and ready to leave. Rn persuading pt not to leave AMA, reports pt is currently refusing all services, and reports he has no therapy needs at this time. Will defer and continue to follow peripherally if needs should arise prior to discharge.        Charles Hernadez, PT, DPT

## 2021-08-17 NOTE — PROGRESS NOTES
Patient wants to leave AMA. Pageadolfo MA. Refused medications at this time. \"I'm not staying another goddamn day. \"

## 2021-08-17 NOTE — PROGRESS NOTES
0000 Report received from ANITA DÍAZ RN. Patient resting, no needs expressed. 0300 Patient refused blood work stating, 'what did they do with the blood they got in the afternoon? \" Attempted to explain to the patient but he just closed is eyes and said \"yeah, you can stop, I dont want to. \"    0330 TRANSFER - OUT REPORT:    Verbal report given to RN(name) on Gabby Savers  being transferred to 6S(unit) for routine progression of care       Report consisted of patients Situation, Background, Assessment and   Recommendations(SBAR). Information from the following report(s) SBAR, Kardex, Accordion and Cardiac Rhythm NSR was reviewed with the receiving nurse. Lines:   Peripheral IV 08/16/21 Left Antecubital (Active)   Site Assessment Clean, dry, & intact 08/17/21 0101   Phlebitis Assessment 0 08/17/21 0101   Infiltration Assessment 0 08/17/21 0101   Dressing Status Clean, dry, & intact 08/17/21 0101   Dressing Type Transparent;Tape 08/17/21 0101   Hub Color/Line Status Pink;Flushed;Capped 08/17/21 0101   Action Taken Open ports on tubing capped 08/16/21 1647   Alcohol Cap Used Yes 08/17/21 0101        Opportunity for questions and clarification was provided. Patient transported with:   Registered Nurse       [id number] Patient taken to room on wheelchair by RN.

## 2021-08-17 NOTE — PROGRESS NOTES
Occupational Therapy: defer    Orders received, chart reviewed, discussed with RN in prep for OT eval.  Per rn, pt agitated, pulling off leads, dressed, and ready to leave. Rn persuading pt not to leave AMA, reports pt is currently refusing all services, and reports he has no therapy needs at this time. Will defer and continue to follow peripherally if needs should arise prior to discharge.        Tommy Hill, OTR/L

## 2021-08-17 NOTE — PROGRESS NOTES
Transition of Care Plan   RUR- 9% Low Risk   DISPOSITION: The disposition plan is pending medical progression and recommendation.  F/U with PCP/Specialist     Transport: [facility]/family     Reason for Admission:  \"had a stroke\"                   RUR Score: 9% Low Risk                   Plan for utilizing home health: N/A       PCP: First and Last name:  Brie Gold MD     Name of Practice: [facility]   Are you a current patient: Yes/No: Yes   Approximate date of last visit: 6 months ago   Can you participate in a virtual visit with your PCP: N/A                    Current Advanced Directive/Advance Care Plan: Full Code  Has no ACP documentation on file at this time. Healthcare Decision Maker:   Click here to complete [employer] including selection of the Healthcare Decision Maker Relationship (ie \"Primary\")  Mother  Girlfriend                         Transition of Care Plan:  Pending recommendation. Reviewed chart for transitions of care and discussed in rounds. CM met with patient at bedside to explain role and offer support. Patient is alert and oriented x4 and confirmed demographics. Baseline: Does not utilize DMEs  ADLs/IDALS: Independent   Previous Home Health: N/A  Previous SNF/IPR:N/A  ER Contact: Roger Crowell - [phone]    Patient lives in a 2 level house with 0 steps to enter. Patient reports that he lives with his girlfriend. Patient is independent with ADLs/IDALs. Patient reports he does not utilize DMEs to ambulate. Patient's preferred pharmacy is [facility] located on [place] for his perscriptions. Patient's girlfriend is expected to transport at discharge. Care Management Interventions  PCP Verified by CM: Yes  Palliative Care Criteria Met (RRAT>21 & CHF Dx)?: No  Mode of Transport at Discharge:  Other (see comment)  Transition of Care Consult (CM Consult): Discharge Planning  MyChart Signup: No  Discharge Durable Medical Equipment: No  Physical Therapy Consult: Yes  Occupational Therapy Consult: Yes  Speech Therapy Consult: No  Current Support Network: Lives with Spouse  Confirm Follow Up Transport: Family  The Patient and/or Patient Representative was Provided with a Choice of Provider and Agrees with the Discharge Plan?: Yes  Freedom of Choice List was Provided with Basic Dialogue that Supports the Patient's Individualized Plan of Care/Goals, Treatment Preferences and Shares the Quality Data Associated with the Providers?: Yes   Resource Information Provided?: No     At 11:05am -  perfect served attending to retreive information to scheduled outpatient ENT appointment. (awaiting response). At 12:00pm -  was able to locate and schedule ENT appointment on behalf of patient. ENT appointment for dizziness  Physician: Dr. Sima Lamas  Phone Number: [phone]  Patient is encouraged to arrive at 11:15am on Wednesday 10/13/21  Address: [facility], [address] [place] [address]. Patient will need to bring the following to his appointment:  Photo ID  Insurance Card  List of Medications    CM will continue to follow, provide support and assist with PRITESH needs as they arise.     Yan Carballo, BOLIVAR, CRM, LMHP-e

## 2021-08-17 NOTE — PROGRESS NOTES
Neurology Progress Note  Kayleigh Jeong NP      Date of admission: 8/16/2021    Patient: Jovany Russell MRN: [id number]  SSN: [id number]    YOB: 1965  Age: 55 y.o. Sex: male        Subjective:     HPI: Jovany Russell is a 55 y.o. male with history of diabetes, hypertension, obstructive sleep apnea, smoking, who was admitted to this hospital a week ago with symptoms of dizziness/room spinning sensation and unsteadiness of gait. He had workup done including MRI of the brain, which did not show anything acute. He was treated symptomatically with meclizine and discharged home. However, his symptoms of unsteadiness did not improve, and he has been having difficulty using his left arm and it feels incoordinated. He came back to the hospital and was readmitted for further workup. Denies any changes in vision, speech, or swallowing. No chest pain, shortness of breath, palpitations. No headache. Interval 08/17/21:   He remains with unsteady gait on the left side. He sitting up in breakfast.  MRI pending. He denies headache, weakness, vision changes, shortness of breath, chest pain. Review of systems  Review of systems negative except as detailed in the HPI, interval, PMH and A&P    Past Medical History:   Diagnosis Date    Abscess     Diabetes ([address]     Hypercholesterolemia     Sleep apnea     Vertigo      History reviewed. No pertinent surgical history.    Family History   Problem Relation Age of Onset    Diabetes Mother     Alcohol abuse Father     No Known Problems Sister     Diabetes Brother     Stroke Brother     No Known Problems Brother     No Known Problems Brother     No Known Problems Sister      Social History     Tobacco Use    Smoking status: Current Every Day Smoker     Packs/day: 1.00    Smokeless tobacco: Never Used   Substance Use Topics    Alcohol use: Yes     Comment: holidays, several timses a yr      Prior to Admission medications    Medication Sig Start Date End Date Taking? Authorizing Provider   meclizine (ANTIVERT) 25 mg tablet Take 1 tab TID X 2 days then TID PRN 8/10/21   Joseline Jimenez MD   Janumet 50-1,000 mg per tablet TAKE 1 TABLET BY MOUTH TWICE DAILY WITH MEALS 8/9/21   Chadwick Freitas MD   atorvastatin (LIPITOR) 40 mg tablet TAKE 1 TABLET BY MOUTH DAILY 7/13/21   Chadwick Freitas MD   glipiZIDE (GLUCOTROL) 10 mg tablet TAKE 1 TABLET BY MOUTH TWICE DAILY 1/6/21   Chadwick Freitas MD   insulin nph-regular human rec (HUMULIN 70-30) 100 unit/mL (70-30) inpn 12 Units by SubCUTAneous route two (2) times daily (with meals). 12/30/20   Charlotte Freitas MD   Insulin Needles, Disposable, 31 gauge x 5/16\" ndle Use once/day 12/30/20   Chadwick Freitas MD   Insulin Needles, Disposable, (Esther Pen Needle) 32 gauge x 5/32\" ndle 1 Units by SubCUTAneous route daily. 9/30/20   Chadwick Freitas MD   lancets (ONETOUCH DELICA LANCETS) 30 gauge misc Check blood sugar in AM ( fasting ) and 2 hours after dinner. 11/13/19   Chadwick Freitas MD   Blood-Glucose Meter (ONETOUCH ULTRA2 METER) monitoring kit Check blood sugar in AM ( fasting ) and 2 hours after dinner. 11/13/19   Chadwick Freitas MD   glucose blood VI test strips (ONETOUCH ULTRA TEST) strip Check blood sugar in AM ( fasting ) and 2 hours after dinner.  11/13/19   Richmond Freitas MD     Current Facility-Administered Medications   Medication Dose Route Frequency Provider Last Rate Last Admin    atorvastatin (LIPITOR) tablet 40 mg  40 mg Oral QHS Tiffany Castillo MD   40 mg at 08/16/21 2124    glipiZIDE (GLUCOTROL) tablet 10 mg  10 mg Oral ACB&D Tiffany Castillo MD        meclizine (ANTIVERT) tablet 25 mg  25 mg Oral TID Tiffany Castillo MD   25 mg at 08/16/21 2124    acetaminophen (TYLENOL) tablet 650 mg  650 mg Oral Q4H PRN Tiffany Castillo MD        Or   Aneudy Armijo acetaminophen (TYLENOL) solution 650 mg  650 mg Per NG tube Q4H PRN Tiffany Castillo MD        Or   Aneudy Armijo acetaminophen (TYLENOL) suppository 650 mg  650 mg Rectal Q4H PRN Chiara Zbigniew Ballard MD        aspirin chewable tablet 81 mg  81 mg Oral DAILY Tiffany Castillo MD        glucose chewable tablet 16 g  4 Tablet Oral PRN Tiffany Castillo MD        dextrose (D50W) injection syrg 12.5-25 g  25-50 mL IntraVENous PRN Tiffany Castillo MD        glucagon (GLUCAGEN) injection 1 mg  1 mg IntraMUSCular PRN Tiffany Castillo MD        insulin lispro (HUMALOG) injection   SubCUTAneous AC&HS Tiffany Castillo MD   4 Units at 21    insulin glargine (LANTUS) injection 5 Units  5 Units SubCUTAneous QHS Tiffany Castillo MD   5 Units at 21    alogliptin (NESINA) tablet 12.5 mg  12.5 mg Oral BID Tiffany Castillo MD   12.5 mg at 21    And    metFORMIN (GLUCOPHAGE) tablet 1,000 mg  1,000 mg Oral BID WITH MEALS Tiffany Castillo MD   1,000 mg at 21        No Known Allergies    Objective:     Vitals:    21 0307 21 0442 21 0506 21 0620   BP: 127/84   (!) 123/91   Pulse: 63 69 66 (!) 59   Resp: 16 15  15   Temp: 98.3 °F (36.8 °C) 98.3 °F (36.8 °C)  97.3 °F (36.3 °C)   SpO2: 98% 99%  100%        Temp (24hrs), Av °F (36.7 °C), Min:97.3 °F (36.3 °C), Max:98.3 °F (36.8 °C)        O2 Device: None (Room air)         Intake/Output Summary (Last 24 hours) at 2021 0729  Last data filed at 2021 2000  Gross per 24 hour   Intake 350 ml   Output 0 ml   Net 350 ml       General: In NAD. Cardiac: RRR  Lungs: Unlabored breathing. Abdomen: Soft/NT/non-distended. Extremities. No edema. Neurologic Exam:  Mental Status:  Alert and oriented x 4. Language:    Grossly intact fluency and comprehension. No dysarthria. Cranial Nerves:   Pupils equal, round and reactive to light. Visual fields intact. Extraocular movements intact w/o nystagmus      Facial sensation intact to LT     Facial activation symmetric. Hearing grossly intact. Motor:    Bulk and tone normal.      5/5 strength in all extremities. No pronator drift.       No involuntary movements. Sensation:    Sensation intact throughout to light touch. Coordination & Gait:  very Mild dysmetria on the left.  gait deferred    LABS:  Recent Labs     08/16/21  0936   WBC 8.6   HGB 14.9   HCT 43.4        Recent Labs     08/16/21  0936      K 4.0      CO2 29   BUN 6   CREA 0.88   *   CA 9.3     Recent Labs     08/16/21  0936   ALT 30      TBILI 0.5   TP 7.9   ALB 3.4*   GLOB 4.5*     No results for input(s): INR, PTP, APTT, INREXT in the last 72 hours. No results for input(s): PHI, PCO2I, PO2I, HCO3I in the last 72 hours.     Recent Labs     08/16/21  1605 08/16/21  0936   TROIQ <0.05 <0.05     Lab Results   Component Value Date/Time    Cholesterol, total 130 08/10/2021 07:00 AM    HDL Cholesterol 38 08/10/2021 07:00 AM    LDL, calculated 76.2 08/10/2021 07:00 AM    Triglyceride 79 08/10/2021 07:00 AM    CHOL/HDL Ratio 3.4 08/10/2021 07:00 AM     Lab Results   Component Value Date/Time    Glucose (POC) 311 (H) 08/16/2021 09:06 PM    Glucose (POC) 296 (H) 08/16/2021 06:40 PM    Glucose (POC) 276 (H) 08/16/2021 02:10 PM    Glucose (POC) 276 (H) 08/10/2021 08:25 AM    Glucose (POC) 284 (H) 08/11/2017 01:58 PM    Glucose  12/30/2019 08:03 AM    Glucose  07/02/2018 08:19 AM    Glucose  10/30/2017 08:09 AM     Lab Results   Component Value Date/Time    Color YELLOW/STRAW 08/11/2017 12:15 PM    Appearance CLEAR 08/11/2017 12:15 PM    Specific gravity 1.020 08/11/2017 12:15 PM    Specific gravity 1.013 06/10/2013 01:13 PM    pH (UA) 5.5 08/11/2017 12:15 PM    Protein NEGATIVE  08/11/2017 12:15 PM    Glucose >1,000 (A) 08/11/2017 12:15 PM    Ketone NEGATIVE  08/11/2017 12:15 PM    Bilirubin NEGATIVE  08/11/2017 12:15 PM    Urobilinogen 0.2 08/11/2017 12:15 PM    Nitrites NEGATIVE  08/11/2017 12:15 PM    Leukocyte Esterase NEGATIVE  08/11/2017 12:15 PM    Epithelial cells FEW 08/11/2017 12:15 PM    Bacteria NEGATIVE  08/11/2017 12:15 PM WBC 0-4 08/11/2017 12:15 PM    RBC 20-50 08/11/2017 12:15 PM       No results for input(s): FE, TIBC, PSAT, FERR in the last 72 hours. Lab Results   Component Value Date/Time    Folate 17.6 08/10/2021 07:00 AM      No results for input(s): PH, PCO2, PO2 in the last 72 hours. Recent Labs     08/16/21  1605 08/16/21  0936   TROIQ <0.05 <0.05       Imaging:  CT HEAD WO CONT    Result Date: 8/16/2021  No acute process or change compared to the prior exam.       CT Results:  Results from Hospital Encounter encounter on 08/16/21    CT HEAD WO CONT    Narrative  EXAM: CT HEAD WO CONT    INDICATION: left sided weakness    COMPARISON: 8/9/2021. CONTRAST: None. TECHNIQUE: Unenhanced CT of the head was performed using 5 mm images. Brain and  bone windows were generated. Coronal and sagittal reformats. CT dose reduction  was achieved through use of a standardized protocol tailored for this  examination and automatic exposure control for dose modulation. FINDINGS:  The ventricles and sulci are normal in size, shape and configuration. . There is  no significant white matter disease. There is no intracranial hemorrhage,  extra-axial collection, or mass effect. The basilar cisterns are open. No CT  evidence of acute infarct. The bone windows demonstrate no abnormalities. The visualized portions of the  paranasal sinuses and mastoid air cells are clear. Impression  No acute process or change compared to the prior exam.      Results from Hospital Encounter encounter on 08/10/21    CTA HEAD NECK W CONT    Narrative  *PRELIMINARY REPORT*    No intracranial large vessel occlusion. Preliminary report was provided by Dr. Hernan Mcgrath, the on-call radiologist, at 7:50  PM.    Final report to follow.     *END PRELIMINARY REPOR*    CLINICAL HISTORY: Dizziness    EXAMINATION:  CT ANGIOGRAPHY HEAD AND NECK    COMPARISON: None    TECHNIQUE:  Following the uneventful administration of iodinated contrast  material, axial CT angiography of the head and neck was performed. Delayed axial  images through the head were also obtained. Coronal and sagittal reconstructions  were obtained. Manual postprocessing of images was performed. 3-D  Sagittal  maximal intensity projection images were obtained. 3-D Coronal maximal  intensity projections were obtained. CT dose reduction was achieved through use  of a standardized protocol tailored for this examination and automatic exposure  control for dose modulation. FINDINGS:    Delayed contrast-enhanced head CT:    The ventricles are midline without hydrocephalus. There is no acute intra or  extra-axial hemorrhage. The basal cisterns are clear. The paranasal sinuses are  clear. CTA NECK:    Great vessels: Normal arch anatomy with the origins patent. Right subclavian artery: Patent    Left subclavian artery: Patent    Right common carotid artery: Patent    Left common carotid artery: Patent    Cervical right internal carotid artery: Patent with no significant stenosis by  NASCET criteria. Cervical left internal carotid artery: Patent with no significant stenosis by  NASCET criteria. Right vertebral artery: Patent    Left vertebral artery: Patent    The lung apices are clear. The thyroid is homogeneous. No cervical  lymphadenopathy. CTA HEAD:    Right cavernous internal carotid artery: Patent    Left cavernous internal carotid artery: Patent    Anterior cerebral arteries: Patent    Anterior communicating artery: Patent    Right middle cerebral artery: Patent    Left middle cerebral artery: Patent    Posterior communicating arteries: Patent    Posterior cerebral arteries: Patent    Basilar artery: Patent    Distal vertebral arteries: Patent    No evidence for intracranial aneurysm or hemodynamically significant stenosis. Impression  No evidence of large vessel occlusion or hemodynamically significant carotid  stenosis.       CT HEAD WO CONT    Narrative  INDICATION: dizziness, onset yesterday. Difficulty ambulating. Nausea. Exam: Noncontrast CT of the brain is performed with 5 mm collimation. CT dose reduction was achieved with the use of the standardized protocol  tailored for this examination and automatic exposure control for dose  modulation. Adaptive statistical iterative reconstruction (ASIR) was utilized. FINDINGS: There is no acute intracranial hemorrhage, mass, mass effect or  herniation. Ventricular system is normal. The gray-white matter differentiation  is well-preserved. The mastoid air cells are well pneumatized. The visualized  paranasal sinuses are normal.    Impression  No acute intracranial hemorrhage, mass or infarct. MRI Results:  Results from [place] encounter on 08/10/21    MRI BRAIN WO CONT    Narrative  CLINICAL HISTORY: Dizziness, vertigo. INDICATION: Dizziness. COMPARISON: 4/9/2021    TECHNIQUE: MR examination of the brain includes axial and sagittal T1, coronal  T2, axial T2, axial FLAIR, axial gradient echo, axial DWI. CONTRAST: None    FINDINGS:  There is no intracranial mass, hemorrhage or evidence of acute infarction. Remote lacunar infarct in the left basal ganglia as well. Periventricular and scattered foci of increased T2 signal intensity corona  radiata and centrum semiovale. There is a small remote lacunar infarction in the  left centrum semiovale. This abuts the lateral ventricle and the left frontal  lobe anteriorly. Trace mucoperiosteal thickening at the base of the maxillary  sinuses. IACs are symmetric in size. The brain architecture is normal. There is no evidence of midline shift or  mass-effect. There are no extra-axial fluid collections. There is no Chiari or  syrinx. The pituitary and infundibulum are grossly unremarkable. There is no  skull base mass. Cerebellopontine angles are grossly unremarkable. The major  intracranial vascular flow-voids are unremarkable. The cavernous sinuses are  symmetric.  Optic chiasm and infundibulum grossly unremarkable. Orbits are  grossly symmetric. Dural venous sinuses are grossly patent. The mastoid air cells are well pneumatized and clear. Impression  Mild chronic microvascular ischemic change with small remote lacunar  infarctions. There is no intracranial mass, hemorrhage or evidence of acute infarction. No acute intracranial process is demonstrated. XR Results   Results from [place] encounter on 06/10/13    XR KNEE LT 3 V      VAS/US Results (maximum last 3): No results found for this or any previous visit. TTE        EKG  Results for orders placed or performed during the hospital encounter of 08/16/21   EKG, 12 LEAD, INITIAL   Result Value Ref Range    Ventricular Rate 91 BPM    Atrial Rate 91 BPM    P-R Interval 178 ms    QRS Duration 76 ms    Q-T Interval 348 ms    QTC Calculation (Bezet) 428 ms    Calculated P Axis 61 degrees    Calculated R Axis 10 degrees    Calculated T Axis -20 degrees    Diagnosis       Sinus rhythm with occasional premature ventricular complexes  T wave abnormality, consider inferior ischemia  Abnormal ECG  When compared with ECG of 09-AUG-2021 17:35,  premature ventricular complexes are now present  Confirmed by Hernando Eubanks MD, Avery Tapia ([id number]) on 8/16/2021 12:25:29 PM         Hospital Problems  Date Reviewed: 12/30/2020        Codes Class Noted POA    CVA (cerebral vascular accident) [facility]) ICD-10-CM: I63.9  ICD-9-CM: 434.91  8/16/2021 Unknown              Assessment/Plan:     Diane Kay is a 55 y.o. male who presented with persistent unsteady gait, incoordination of the left upper extremity, and weakness. MRI of the brain is pending concerns for small vessel lacunar infarct.    Possible CVA   -MRI of the brain pending   -echo ,pending    -Hemoglobin A1c 11.2, goal is 6 will defer treatment to primary team  recommend  consulting diabetes management   -.8, goal is less than 70, increase Lipitor to 80 mg nightly   -Continue ASA 81mg daily   -PT/OT/SLP   -Stroke education     We will follow after MRI  Thank you for this consult.     Signed By: Lanny Dunbar NP     August 17, 2021 7:29 AM

## 2021-08-17 NOTE — PROGRESS NOTES
Notified by ENT consulting MD that the consult will need to be completed outpatient. Herbert Lara MD notified. Patient agitated, pulled off telemetry leads and is not dressed waiting to leave. Persuaded patient to be discharged instead of leave AMA. Patient refusing telemetry at this time.

## 2021-08-17 NOTE — PROGRESS NOTES
Problem: Diabetes Self-Management  Goal: *Disease process and treatment process  Description: Define diabetes and identify own type of diabetes; list 3 options for treating diabetes. 8/17/2021 1433 by Gómez Keller  Outcome: Resolved/Not Met  8/17/2021 1107 by Gómez Keller  Outcome: Progressing Towards Goal  Goal: *Incorporating nutritional management into lifestyle  Description: Describe effect of type, amount and timing of food on blood glucose; list 3 methods for planning meals. 8/17/2021 1433 by Gómez Keller  Outcome: Resolved/Not Met  8/17/2021 1107 by Gómez Keller  Outcome: Progressing Towards Goal  Goal: *Incorporating physical activity into lifestyle  Description: State effect of exercise on blood glucose levels. 8/17/2021 1433 by Gómez Keller  Outcome: Resolved/Not Met  8/17/2021 1107 by Gómez Keller  Outcome: Progressing Towards Goal  Goal: *Developing strategies to promote health/change behavior  Description: Define the ABC's of diabetes; identify appropriate screenings, schedule and personal plan for screenings. 8/17/2021 1433 by Gómez Keller  Outcome: Resolved/Not Met  8/17/2021 1107 by Gómez Keller  Outcome: Progressing Towards Goal  Goal: *Using medications safely  Description: State effect of diabetes medications on diabetes; name diabetes medication taking, action and side effects. 8/17/2021 1433 by Gómez Keller  Outcome: Resolved/Not Met  8/17/2021 1107 by Gómez Keller  Outcome: Progressing Towards Goal  Goal: *Monitoring blood glucose, interpreting and using results  Description: Identify recommended blood glucose targets  and personal targets.   8/17/2021 1433 by Gómez Keller  Outcome: Resolved/Not Met  8/17/2021 1107 by Gómez Keller  Outcome: Progressing Towards Goal  Goal: *Prevention, detection, treatment of acute complications  Description: List symptoms of hyper- and hypoglycemia; describe how to treat low blood sugar and actions for lowering  high blood glucose level. 8/17/2021 1433 by Lina Eric  Outcome: Resolved/Not Met  8/17/2021 1107 by Lina Eric  Outcome: Progressing Towards Goal  Goal: *Prevention, detection and treatment of chronic complications  Description: Define the natural course of diabetes and describe the relationship of blood glucose levels to long term complications of diabetes. 8/17/2021 1433 by Lina Eric  Outcome: Resolved/Not Met  8/17/2021 1107 by Lina Eric  Outcome: Progressing Towards Goal  Goal: *Developing strategies to address psychosocial issues  Description: Describe feelings about living with diabetes; identify support needed and support network  8/17/2021 1433 by Lina Eric  Outcome: Resolved/Not Met  8/17/2021 1107 by Lina Eric  Outcome: Progressing Towards Goal  Goal: *Insulin pump training  8/17/2021 1433 by Lina Eric  Outcome: Resolved/Not Met  8/17/2021 1107 by Lina Eric  Outcome: Progressing Towards Goal  Goal: *Sick day guidelines  8/17/2021 1433 by Lina Eric  Outcome: Resolved/Not Met  8/17/2021 1107 by Lnia Eric  Outcome: Progressing Towards Goal  Goal: *Patient Specific Goal (EDIT GOAL, INSERT TEXT)  8/17/2021 1433 by Lina Eric  Outcome: Resolved/Not Met  8/17/2021 1107 by Lina Eric  Outcome: Progressing Towards Goal     Problem: Patient Education: Go to Patient Education Activity  Goal: Patient/Family Education  8/17/2021 1433 by Lina Eric  Outcome: Resolved/Not Met  8/17/2021 1107 by Lina Eric  Outcome: Progressing Towards Goal     Problem: Falls - Risk of  Goal: *Absence of Falls  Description: Document Meghna Jesus Fall Risk and appropriate interventions in the flowsheet.   8/17/2021 1433 by Lina Eric  Outcome: Resolved/Not Met  8/17/2021 1107 by Lina Eric  Outcome: Progressing Towards Goal  Note: Fall Risk Interventions:  Mobility Interventions: Assess mobility with egress test, Patient to call before getting OOB, PT Consult for mobility concerns         Medication Interventions: Assess postural VS orthostatic hypotension, Patient to call before getting OOB, Teach patient to arise slowly                   Problem: Patient Education: Go to Patient Education Activity  Goal: Patient/Family Education  8/17/2021 1433 by Maxim Reaves  Outcome: Resolved/Not Met  8/17/2021 1107 by Maxim Reaves  Outcome: Progressing Towards Goal     Problem: Patient Education: Go to Patient Education Activity  Goal: Patient/Family Education  8/17/2021 1433 by Maxim Reaves  Outcome: Resolved/Not Met  8/17/2021 1107 by Maxim Reaves  Outcome: Progressing Towards Goal     Problem: TIA/CVA Stroke: 0-24 hours  Goal: Off Pathway (Use only if patient is Off Pathway)  8/17/2021 1433 by Maxim Reaves  Outcome: Resolved/Not Met  8/17/2021 1107 by Maxim Reaves  Outcome: Progressing Towards Goal  Goal: Activity/Safety  8/17/2021 1433 by Maxim Reaves  Outcome: Resolved/Not Met  8/17/2021 1107 by Maxim Reaves  Outcome: Progressing Towards Goal  Goal: Consults, if ordered  8/17/2021 1433 by Maxim Reaves  Outcome: Resolved/Not Met  8/17/2021 1107 by Maxim Reaves  Outcome: Progressing Towards Goal  Goal: Diagnostic Test/Procedures  8/17/2021 1433 by Maxim Reaves  Outcome: Resolved/Not Met  8/17/2021 1107 by Maxim Reaves  Outcome: Progressing Towards Goal  Goal: Nutrition/Diet  8/17/2021 1433 by Maxim Reaves  Outcome: Resolved/Not Met  8/17/2021 1107 by Maxim Reaves  Outcome: Progressing Towards Goal  Goal: Discharge Planning  8/17/2021 1433 by Maxim Reaves  Outcome: Resolved/Not Met  8/17/2021 1107 by Maxim Reaves  Outcome: Progressing Towards Goal  Goal: Medications  8/17/2021 1433 by Maxim Reaves  Outcome: Resolved/Not Met  8/17/2021 1107 by Maxim Reaves  Outcome: Progressing Towards Goal  Goal: Respiratory  8/17/2021 1433 by Anthony Sebastian  Outcome: Resolved/Not Met  8/17/2021 1107 by Anthony Sebastian  Outcome: Progressing Towards Goal  Goal: Treatments/Interventions/Procedures  8/17/2021 1433 by Anthony Sebastian  Outcome: Resolved/Not Met  8/17/2021 1107 by Anthony Sebastian  Outcome: Progressing Towards Goal  Goal: Minimize risk of bleeding post-thrombolytic infusion  8/17/2021 1433 by Anthony Sebastian  Outcome: Resolved/Not Met  8/17/2021 1107 by Anthony Sebastian  Outcome: Progressing Towards Goal  Goal: Monitor for complications post-thrombolytic infusion  8/17/2021 1433 by Anthony Sebastian  Outcome: Resolved/Not Met  8/17/2021 1107 by Anthony Sebastian  Outcome: Progressing Towards Goal  Goal: Psychosocial  8/17/2021 1433 by Anthony Sebastian  Outcome: Resolved/Not Met  8/17/2021 1107 by Anthony Sebastian  Outcome: Progressing Towards Goal  Goal: *Hemodynamically stable  8/17/2021 1433 by Anthony Sebastian  Outcome: Resolved/Not Met  8/17/2021 1107 by Anthony Sebastian  Outcome: Progressing Towards Goal  Goal: *Neurologically stable  Description: Absence of additional neurological deficits    8/17/2021 1433 by Anthony Sebastian  Outcome: Resolved/Not Met  8/17/2021 1107 by Anthony Sebastian  Outcome: Progressing Towards Goal  Goal: *Verbalizes anxiety and depression are reduced or absent  8/17/2021 1433 by Anthony Sebastian  Outcome: Resolved/Not Met  8/17/2021 1107 by Anthony Sebastian  Outcome: Progressing Towards Goal  Goal: *Absence of Signs of Aspiration on Current Diet  8/17/2021 1433 by Anthony Sebastian  Outcome: Resolved/Not Met  8/17/2021 1107 by Anthony Sebastian  Outcome: Progressing Towards Goal  Goal: *Absence of deep venous thrombosis signs and symptoms(Stroke Metric)  8/17/2021 1433 by Anthony Sebastian  Outcome: Resolved/Not Met  8/17/2021 1107 by Anthony Sebastian  Outcome: Progressing Towards Goal  Goal: *Ability to perform ADLs and demonstrates progressive mobility and function  8/17/2021 1433 by Melab Dome  Outcome: Resolved/Not Met  8/17/2021 1107 by Melba Dome  Outcome: Progressing Towards Goal  Goal: *Stroke education started(Stroke Metric)  8/17/2021 1433 by Melba Dome  Outcome: Resolved/Not Met  8/17/2021 1107 by Melba Dome  Outcome: Progressing Towards Goal  Goal: *Dysphagia screen performed(Stroke Metric)  8/17/2021 1433 by Melba Dome  Outcome: Resolved/Not Met  8/17/2021 1107 by Melba Dome  Outcome: Progressing Towards Goal  Goal: *Rehab consulted(Stroke Metric)  8/17/2021 1433 by Melba Dome  Outcome: Resolved/Not Met  8/17/2021 1107 by Melba Dome  Outcome: Progressing Towards Goal     Problem: TIA/CVA Stroke: Day 2 Until Discharge  Goal: Off Pathway (Use only if patient is Off Pathway)  8/17/2021 1433 by Melba Dome  Outcome: Resolved/Not Met  8/17/2021 1107 by Melba Dome  Outcome: Progressing Towards Goal  Goal: Activity/Safety  8/17/2021 1433 by Melba Dome  Outcome: Resolved/Not Met  8/17/2021 1107 by Melba Dome  Outcome: Progressing Towards Goal  Goal: Diagnostic Test/Procedures  8/17/2021 1433 by Melba Dome  Outcome: Resolved/Not Met  8/17/2021 1107 by Melba Dome  Outcome: Progressing Towards Goal  Goal: Nutrition/Diet  8/17/2021 1433 by Melba Dome  Outcome: Resolved/Not Met  8/17/2021 1107 by Melba Dome  Outcome: Progressing Towards Goal  Goal: Discharge Planning  8/17/2021 1433 by Melba Dome  Outcome: Resolved/Not Met  8/17/2021 1107 by Melba Dome  Outcome: Progressing Towards Goal  Goal: Medications  8/17/2021 1433 by Melba Dome  Outcome: Resolved/Not Met  8/17/2021 1107 by Melba Dome  Outcome: Progressing Towards Goal  Goal: Respiratory  8/17/2021 1433 by Melba Dome  Outcome: Resolved/Not Met  8/17/2021 1107 by Melba Dome  Outcome: Progressing Towards Goal  Goal: Treatments/Interventions/Procedures  8/17/2021 1433 by Olamide Morales E  Outcome: Resolved/Not Met  8/17/2021 1107 by Rajiv Crespo  Outcome: Progressing Towards Goal  Goal: Psychosocial  8/17/2021 1433 by Rajiv Crespo  Outcome: Resolved/Not Met  8/17/2021 1107 by Rajiv Crespo  Outcome: Progressing Towards Goal  Goal: *Verbalizes anxiety and depression are reduced or absent  8/17/2021 1433 by Rajiv Crespo  Outcome: Resolved/Not Met  8/17/2021 1107 by Rajiv Crespo  Outcome: Progressing Towards Goal  Goal: *Absence of aspiration  8/17/2021 1433 by Rajiv Crespo  Outcome: Resolved/Not Met  8/17/2021 1107 by Rajiv Crespo  Outcome: Progressing Towards Goal  Goal: *Absence of deep venous thrombosis signs and symptoms(Stroke Metric)  8/17/2021 1433 by Rajiv Crespo  Outcome: Resolved/Not Met  8/17/2021 1107 by Rajiv Crespo  Outcome: Progressing Towards Goal  Goal: *Optimal pain control at patient's stated goal  8/17/2021 1433 by Rajiv Crespo  Outcome: Resolved/Not Met  8/17/2021 1107 by Rajiv Crespo  Outcome: Progressing Towards Goal  Goal: *Tolerating diet  8/17/2021 1433 by Rajiv Crespo  Outcome: Resolved/Not Met  8/17/2021 1107 by Rajiv Crespo  Outcome: Progressing Towards Goal  Goal: *Ability to perform ADLs and demonstrates progressive mobility and function  8/17/2021 1433 by Rajiv Crespo  Outcome: Resolved/Not Met  8/17/2021 1107 by Rajiv Crespo  Outcome: Progressing Towards Goal  Goal: *Stroke education continued(Stroke Metric)  8/17/2021 1433 by Rajiv Crespo  Outcome: Resolved/Not Met  8/17/2021 1107 by Rajiv Crespo  Outcome: Progressing Towards Goal     Problem: Ischemic Stroke: Discharge Outcomes  Goal: *Verbalizes anxiety and depression are reduced or absent  8/17/2021 1433 by Rajiv Crespo  Outcome: Resolved/Not Met  8/17/2021 1107 by Rajiv Crespo  Outcome: Progressing Towards Goal  Goal: *Verbalize understanding of risk factor modification(Stroke Metric)  8/17/2021 1433 by Navarro Ramos E  Outcome: Resolved/Not Met  8/17/2021 1107 by Obi Trivedi  Outcome: Progressing Towards Goal  Goal: *Hemodynamically stable  8/17/2021 1433 by Obi Trivedi  Outcome: Resolved/Not Met  8/17/2021 1107 by Obi Trivedi  Outcome: Progressing Towards Goal  Goal: *Absence of aspiration pneumonia  8/17/2021 1433 by Obi Trivedi  Outcome: Resolved/Not Met  8/17/2021 1107 by Obi Trivedi  Outcome: Progressing Towards Goal  Goal: *Aware of needed dietary changes  8/17/2021 1433 by Obi Trivedi  Outcome: Resolved/Not Met  8/17/2021 1107 by Obi Trivedi  Outcome: Progressing Towards Goal  Goal: *Verbalize understanding of prescribed medications including anti-coagulants, anti-lipid, and/or anti-platelets(Stroke Metric)  8/17/2021 1433 by Obi Trivedi  Outcome: Resolved/Not Met  8/17/2021 1107 by Obi Trivedi  Outcome: Progressing Towards Goal  Goal: *Tolerating diet  8/17/2021 1433 by Obi Trivedi  Outcome: Resolved/Not Met  8/17/2021 1107 by Obi Trivedi  Outcome: Progressing Towards Goal  Goal: *Aware of follow-up diagnostics related to anticoagulants  8/17/2021 1433 by Obi Trivedi  Outcome: Resolved/Not Met  8/17/2021 1107 by Obi Trivedi  Outcome: Progressing Towards Goal  Goal: *Ability to perform ADLs and demonstrates progressive mobility and function  8/17/2021 1433 by Obi Trivedi  Outcome: Resolved/Not Met  8/17/2021 1107 by Obi Trivedi  Outcome: Progressing Towards Goal  Goal: *Absence of DVT(Stroke Metric)  8/17/2021 1433 by Obi Trivedi  Outcome: Resolved/Not Met  8/17/2021 1107 by Obi Trivedi  Outcome: Progressing Towards Goal  Goal: *Absence of aspiration  8/17/2021 1433 by Obi Trivedi  Outcome: Resolved/Not Met  8/17/2021 1107 by Obi Trivedi  Outcome: Progressing Towards Goal  Goal: *Optimal pain control at patient's stated goal  8/17/2021 1433 by Obi Trivedi  Outcome: Resolved/Not Met  8/17/2021 1107 by Jerman Nolasco  Outcome: Progressing Towards Goal  Goal: *Home safety concerns addressed  8/17/2021 1433 by Jerman Nolasco  Outcome: Resolved/Not Met  8/17/2021 1107 by Jerman Nolasco  Outcome: Progressing Towards Goal  Goal: *Describes available resources and support systems  8/17/2021 1433 by Jerman Nolasco  Outcome: Resolved/Not Met  8/17/2021 1107 by Jerman Nolasco  Outcome: Progressing Towards Goal  Goal: *Verbalizes understanding of activation of EMS(911) for stroke symptoms(Stroke Metric)  8/17/2021 1433 by Jerman Nolasco  Outcome: Resolved/Not Met  8/17/2021 1107 by Jerman Nolasco  Outcome: Progressing Towards Goal  Goal: *Understands and describes signs and symptoms to report to providers(Stroke Metric)  8/17/2021 1433 by Jerman Nolasco  Outcome: Resolved/Not Met  8/17/2021 1107 by Jerman Nolasco  Outcome: Progressing Towards Goal  Goal: *Neurolgocially stable (absence of additional neurological deficits)  8/17/2021 1433 by Jerman Nolasco  Outcome: Resolved/Not Met  8/17/2021 1107 by Jerman Nolasco  Outcome: Progressing Towards Goal  Goal: *Verbalizes importance of follow-up with primary care physician(Stroke Metric)  8/17/2021 1433 by Jerman Nolasco  Outcome: Resolved/Not Met  8/17/2021 1107 by Jerman Nolasco  Outcome: Progressing Towards Goal  Goal: *Smoking cessation discussed,if applicable(Stroke Metric)  8/17/2021 1433 by Jerman Nolasco  Outcome: Resolved/Not Met  8/17/2021 1107 by Jerman Nolasco  Outcome: Progressing Towards Goal  Goal: *Depression screening completed(Stroke Metric)  8/17/2021 1433 by Jerman Nolasco  Outcome: Resolved/Not Met  8/17/2021 1107 by Jerman Nolasco  Outcome: Progressing Towards Goal

## 2021-08-18 ENCOUNTER — APPOINTMENT (OUTPATIENT)
Dept: GENERAL RADIOLOGY | Age: 56
End: 2021-08-18
Attending: STUDENT IN AN ORGANIZED HEALTH CARE EDUCATION/TRAINING PROGRAM
Payer: COMMERCIAL

## 2021-08-18 ENCOUNTER — HOSPITAL ENCOUNTER (EMERGENCY)
Age: 56
Discharge: HOME | End: 2021-08-18
Attending: STUDENT IN AN ORGANIZED HEALTH CARE EDUCATION/TRAINING PROGRAM
Payer: COMMERCIAL

## 2021-08-18 VITALS
BODY MASS INDEX: 37.89 KG/M2 | HEART RATE: 92 BPM | SYSTOLIC BLOOD PRESSURE: 152 MMHG | OXYGEN SATURATION: 100 % | WEIGHT: 241.4 LBS | HEIGHT: 67 IN | DIASTOLIC BLOOD PRESSURE: 118 MMHG | RESPIRATION RATE: 18 BRPM | TEMPERATURE: 98.3 F

## 2021-08-18 DIAGNOSIS — R42 VERTIGO: Primary | ICD-10-CM

## 2021-08-18 DIAGNOSIS — R26.9 ABNORMAL GAIT: ICD-10-CM

## 2021-08-18 LAB
ALBUMIN SERPL-MCNC: 3.2 G/DL (ref 3.5–5)
ALBUMIN/GLOB SERPL: 0.7 {RATIO} (ref 1.1–2.2)
ALP SERPL-CCNC: 104 U/L (ref 45–117)
ALT SERPL-CCNC: 27 U/L (ref 12–78)
ANION GAP SERPL CALC-SCNC: 3 MMOL/L (ref 5–15)
AST SERPL-CCNC: 15 U/L (ref 15–37)
ATRIAL RATE: 80 BPM
BASOPHILS # BLD: 0.1 K/UL (ref 0–0.1)
BASOPHILS NFR BLD: 1 % (ref 0–1)
BILIRUB SERPL-MCNC: 0.5 MG/DL (ref 0.2–1)
BUN SERPL-MCNC: 6 MG/DL (ref 6–20)
BUN/CREAT SERPL: 7 (ref 12–20)
CALCIUM SERPL-MCNC: 8.6 MG/DL (ref 8.5–10.1)
CALCULATED P AXIS, ECG09: 44 DEGREES
CALCULATED R AXIS, ECG10: -8 DEGREES
CALCULATED T AXIS, ECG11: 0 DEGREES
CHLORIDE SERPL-SCNC: 102 MMOL/L (ref 97–108)
CO2 SERPL-SCNC: 30 MMOL/L (ref 21–32)
CREAT SERPL-MCNC: 0.87 MG/DL (ref 0.7–1.3)
DIAGNOSIS, 93000: NORMAL
DIFFERENTIAL METHOD BLD: NORMAL
EOSINOPHIL # BLD: 0.1 K/UL (ref 0–0.4)
EOSINOPHIL NFR BLD: 1 % (ref 0–7)
ERYTHROCYTE [DISTWIDTH] IN BLOOD BY AUTOMATED COUNT: 11.6 % (ref 11.5–14.5)
GLOBULIN SER CALC-MCNC: 4.3 G/DL (ref 2–4)
GLUCOSE SERPL-MCNC: 283 MG/DL (ref 65–100)
HCT VFR BLD AUTO: 43.2 % (ref 36.6–50.3)
HGB BLD-MCNC: 14.7 G/DL (ref 12.1–17)
IMM GRANULOCYTES # BLD AUTO: 0 K/UL (ref 0–0.04)
IMM GRANULOCYTES NFR BLD AUTO: 0 % (ref 0–0.5)
LYMPHOCYTES # BLD: 2.5 K/UL (ref 0.8–3.5)
LYMPHOCYTES NFR BLD: 35 % (ref 12–49)
MCH RBC QN AUTO: 30.6 PG (ref 26–34)
MCHC RBC AUTO-ENTMCNC: 34 G/DL (ref 30–36.5)
MCV RBC AUTO: 90 FL (ref 80–99)
MONOCYTES # BLD: 0.5 K/UL (ref 0–1)
MONOCYTES NFR BLD: 7 % (ref 5–13)
NEUTS SEG # BLD: 4.1 K/UL (ref 1.8–8)
NEUTS SEG NFR BLD: 56 % (ref 32–75)
NRBC # BLD: 0 K/UL (ref 0–0.01)
NRBC BLD-RTO: 0 PER 100 WBC
P-R INTERVAL, ECG05: 192 MS
PLATELET # BLD AUTO: 165 K/UL (ref 150–400)
PMV BLD AUTO: 11.6 FL (ref 8.9–12.9)
POTASSIUM SERPL-SCNC: 4.1 MMOL/L (ref 3.5–5.1)
PROT SERPL-MCNC: 7.5 G/DL (ref 6.4–8.2)
Q-T INTERVAL, ECG07: 382 MS
QRS DURATION, ECG06: 84 MS
QTC CALCULATION (BEZET), ECG08: 440 MS
RBC # BLD AUTO: 4.8 M/UL (ref 4.1–5.7)
SODIUM SERPL-SCNC: 135 MMOL/L (ref 136–145)
TROPONIN I SERPL-MCNC: <0.05 NG/ML
VENTRICULAR RATE, ECG03: 80 BPM
WBC # BLD AUTO: 7.3 K/UL (ref 4.1–11.1)

## 2021-08-18 PROCEDURE — 36415 COLL VENOUS BLD VENIPUNCTURE: CPT

## 2021-08-18 PROCEDURE — 73562 X-RAY EXAM OF KNEE 3: CPT

## 2021-08-18 PROCEDURE — 73590 X-RAY EXAM OF LOWER LEG: CPT

## 2021-08-18 PROCEDURE — 93005 ELECTROCARDIOGRAM TRACING: CPT

## 2021-08-18 PROCEDURE — 80053 COMPREHEN METABOLIC PANEL: CPT

## 2021-08-18 PROCEDURE — 99284 EMERGENCY DEPT VISIT MOD MDM: CPT

## 2021-08-18 PROCEDURE — 85025 COMPLETE CBC W/AUTO DIFF WBC: CPT

## 2021-08-18 PROCEDURE — 84484 ASSAY OF TROPONIN QUANT: CPT

## 2021-08-18 RX ORDER — MECLIZINE HYDROCHLORIDE 25 MG/1
25 TABLET ORAL
Qty: 20 TABLET | Refills: 0 | Status: SHIPPED | OUTPATIENT
Start: 2021-08-18 | End: 2022-02-14 | Stop reason: ALTCHOICE

## 2021-08-18 NOTE — DISCHARGE INSTRUCTIONS
[facility] SYSTEM[facility] Departments     For adult and child immunizations, family planning, TB screening, STD testing and women's health services. [facility] AND [facility]: [place] [phone]      [facility] 25   [address]   [address]   170 [facility]: [facility] 200 [facility] [address] [phone]      [address]          [place] [address]     For primary care services, woman and child wellness, and some clinics providing specialty care. [facility] -- [address] [facility] [address] 2525 [facility] [phone]   411 [facility] 200 [facility] 3614 [facility] [phone]   339 [facility] End [facility] [address] [phone]   [address]  [place] [address] [place] [address] 5850  Community  [phone]   [address] [phone]   [facility] 81 [facility] [phone]   [facility] [address] [place] [address] [phone]   [facility]: [facility] [address], [address] [facility] [address] [phone]     [facility] [address] [facility] [address] [phone]   [facility] 5850  Community  [phone]   [employer] [facility]  [address], [place] [address] ([email or web address]) [phone]         Sexual Health/Woman Wellness Clinics    For STD/HIV testing and treatment, pregnancy testing and services, men's health, birth control services, LGBT services, and hepatitis/HPV vaccine services. [facility] [place] for [place] [employer] [address] [facility] [address] [facility] [address] [facility] 1579 600 [address] [phone]   [facility] [address], 5th floor [phone]   Pregnancy 3928 [facility] [address] [facility] [address] [phone]         Specialty Service 8[address] [facility]   [phone]   [place]   [phone]   Women, Infant and Children's Services: [address] [phone]       600 [facility]   [phone]   [address]   [facility]     [phone]   [address] Crisis   [address] [phone]       Local Primary Care Physicians  [facility] [phone]  MD Luis M Duque MD Kay Coop, MD [facility] [phone]  Heriberto Hayden, FNP  Berlin Mclaughlin, MD Brianne Jennings MD [address] [place] [place] [address] [phone]  [facility], MD Michelle Ny [place] [address] [facility] [phone]  MD Zain Pathak, MD Rebeca Clemons MD   [facility] [phone]  [facility] [place], MD Claire Galeazzi, MD Corrin Severs, NP [address] [phone]  MD Amalia Alonso, MD Keyur Nicolas MD Rober Man, MD Katharyn Marshal, MD  Author Oppenheim, MD   33 57 [facility]  Magalis Kennedy MD [address] [phone]  MD Demetris Felix, NP  Amber Zelaya, MD Porter Botello MD Micael Keen, MD Roetta Groves, MD   9013 [facility] [phone]  MD Robyn Marc, FNP  Aldo Yu, NP  Forrestine Najjar, MD Ulus Pippins, MD Aundra Spence, MD Darryl Fuse, MD [facility] [phone]  MD Kaushal Mcintosh MD Linton List, MD Marce Messer MD   [address] [phone]  MD Matilde Dewitt MD [facility] [phone]  MD Oscar Garcia MD Estefana Morale [facility], [address] [facility] [phone]  MD Megan Aragon MD Daneen Brand, MD Merrie Cullens, MD Duana Spare, MD Asher Nutley, PREET Nixon MD 1619  66   [phone]  MD Reese Garland MD Ricard Jock, MD   2102 [facility] [phone]  MD Manisha Taylor, GERRI Garrison, GERRI Praksah MD Melany Kettle, NP Johnnette Dakin, DO Miscellaneous:  Jina Limon [phone]     It was a pleasure taking care of you at [facility] today. We know that when you come to [address] [place] [address], you are entrusting us with your health, comfort, and safety. Our physicians and nurses honor that trust, and we truly appreciate the opportunity to care for you and your loved ones. We also value your feedback. If you receive a survey about your Emergency Department experience today, please fill it out. We care about our patients' feedback, and we listen to what you have to say. Thank you!     Gregorio Torres MD

## 2021-08-18 NOTE — ED NOTES
Archie Marie reviewed discharge instructions with the patient. The patient verbalized understanding. All questions and concerns were addressed. The patient declined a wheelchair and is discharged ambulatory in the care of family members with instructions and prescriptions in hand. Pt is alert and oriented x 4. Respirations are clear and unlabored.

## 2021-08-18 NOTE — ED PROVIDER NOTES
EMERGENCY DEPARTMENT HISTORY AND PHYSICAL EXAM      Date: 8/18/2021  Patient Name: Park Vanegas    History of Presenting Illness     Chief Complaint   Patient presents with    Gait Problem     x 11 days. Pt denies dizziness and reports vertigo. Denies any history of vertigo. Pt reports he was seen on SUnday at [facility] and told it wasn't a stroke. Denies N/v now  pt reports lots of vomiting at onset. History Provided By: Patient    HPI: Park Vanegas, 55 y.o. male with past medical history of htn, type 2 diabetes, presents to the ED with cc of vertigo and left-sided abnormality associated with gait disturbance. Patient reports that he has been experiencing vertigo for the past 11 days. He has been evaluated at [facility] twice for the same complaint 1 week prior, and was even admitted for this. He has had extensive head imaging including CT, CTA, and MRI of the brain. His stroke work-up has been negative. Patient was discharged on meclizine, which he reports he has been taking, although his wife questions this. His stats his last dose of meclizine was earlier this morning. States that meclizine seems to be helping somewhat with his symptoms as his vertiginous sxs seem to be improving every day. Patient currently actually denies any vertiginous symptoms. He states that he is here because he is more concerned about not being able to walk properly. Patient feels this is related to the left side of his body not moving as it should. He reports left upper extremity numbness and tingling sensation which he has had for some time intermittently. Patient is a . In addition, he also reports that his left leg is not moving normally. Denies actual weakness or numbness. He reports stiffness on bending in the left knee and left lower leg. States that prior to onset of his current left lower extremity symptoms, he bumped his left shin at work, causing severe swelling to develop.   States that he was never evaluated for this injury, and that LLE symptoms began after this. Patient reports that he feels he is leaning to 1 side when he walks. All of his left sided symptoms are not new, and was present when he was seen at [facility] as well. Patient was able to ambulate into the ED today without assistance.     PCP: Rome Phelps MD    Current Facility-Administered Medications on File Prior to Encounter   Medication Dose Route Frequency Provider Last Rate Last Admin    [DISCONTINUED] LORazepam (ATIVAN) injection 1 mg  1 mg IntraVENous ON Aldair Ornelas MD        [DISCONTINUED] atorvastatin (LIPITOR) tablet 80 mg  80 mg Oral QHS Joselito Lilly NP       [employer] [DISCONTINUED] glipiZIDE (GLUCOTROL) tablet 10 mg  10 mg Oral ACB&D Jason Souza MD   10 mg at 08/17/21 0956    [DISCONTINUED] meclizine (ANTIVERT) tablet 25 mg  25 mg Oral TID Jason Souza MD   25 mg at 08/17/21 0956    [DISCONTINUED] acetaminophen (TYLENOL) tablet 650 mg  650 mg Oral Q4H PRN Jason Souza MD        [DISCONTINUED] acetaminophen (TYLENOL) solution 650 mg  650 mg Per NG tube Q4H PRN Jason Souza MD       [employer] [DISCONTINUED] acetaminophen (TYLENOL) suppository 650 mg  650 mg Rectal Q4H PRN Jason Souza MD        [DISCONTINUED] aspirin chewable tablet 81 mg  81 mg Oral DAILY Jason Souza MD   81 mg at 08/17/21 0956    [DISCONTINUED] glucose chewable tablet 16 g  4 Tablet Oral PRN Jason Souza MD        [DISCONTINUED] dextrose (D50W) injection syrg 12.5-25 g  25-50 mL IntraVENous PRN Jason Souza MD        [DISCONTINUED] glucagon (GLUCAGEN) injection 1 mg  1 mg IntraMUSCular PRN Jason Souza MD       [employer] [DISCONTINUED] insulin lispro (HUMALOG) injection   SubCUTAneous AC&HS Jason Souza MD   4 Units at 08/17/21 0957    [DISCONTINUED] insulin glargine (LANTUS) injection 5 Units  5 Units SubCUTAneous QHS Jason Souza MD   5 Units at 08/16/21 2123    [DISCONTINUED] alogliptin (NESINA) tablet 12.5 mg 12.5 mg Oral BID Shant Cruz MD   12.5 mg at 08/16/21 1856    [DISCONTINUED] metFORMIN (GLUCOPHAGE) tablet 1,000 mg  1,000 mg Oral BID WITH MEALS Shant Cruz MD   1,000 mg at 08/17/21 3069     Current Outpatient Medications on File Prior to Encounter   Medication Sig Dispense Refill    meclizine (ANTIVERT) 25 mg tablet Take 1 tab TID X 2 days then TID PRN 30 Tablet 0    Janumet 50-1,000 mg per tablet TAKE 1 TABLET BY MOUTH TWICE DAILY WITH MEALS 60 Tablet 0    atorvastatin (LIPITOR) 40 mg tablet TAKE 1 TABLET BY MOUTH DAILY 90 Tablet 0    glipiZIDE (GLUCOTROL) 10 mg tablet TAKE 1 TABLET BY MOUTH TWICE DAILY 90 Tab 0    insulin nph-regular human rec (HUMULIN 70-30) 100 unit/mL (70-30) inpn 12 Units by SubCUTAneous route two (2) times daily (with meals). 18 Adjustable Dose Pre-filled Pen Syringe 3    Insulin Needles, Disposable, 31 gauge x 5/16\" ndle Use once/day 1 Package 11    Insulin Needles, Disposable, (Esther Pen Needle) 32 gauge x 5/32\" ndle 1 Units by SubCUTAneous route daily. 3 Pen Needle 6    lancets (ONETOUCH DELICA LANCETS) 30 gauge misc Check blood sugar in AM ( fasting ) and 2 hours after dinner. 100 Lancet 11    Blood-Glucose Meter (ONETOUCH ULTRA2 METER) monitoring kit Check blood sugar in AM ( fasting ) and 2 hours after dinner. 1 Kit 0    glucose blood VI test strips (ONETOUCH ULTRA TEST) strip Check blood sugar in AM ( fasting ) and 2 hours after dinner. 100 Strip 11       Past History     Past Medical History:  Past Medical History:   Diagnosis Date    Abscess     Diabetes ([facility] [address]     Hypercholesterolemia     Sleep apnea     Vertigo        Past Surgical History:  No past surgical history on file.     Family History:  Family History   Problem Relation Age of Onset    Diabetes Mother     Alcohol abuse Father     No Known Problems Sister     Diabetes Brother     Stroke Brother     No Known Problems Brother     No Known Problems Brother     No Known Problems Sister Social History:  Social History     Tobacco Use    Smoking status: Current Every Day Smoker     Packs/day: 1.00    Smokeless tobacco: Never Used   Substance Use Topics    Alcohol use: Yes     Comment: holidays, several timses a yr    Drug use: Never       Allergies:  No Known Allergies      Review of Systems   Review of Systems   Constitutional: Negative for chills and fever. HENT: Negative for congestion and rhinorrhea. Eyes: Negative for visual disturbance. Respiratory: Negative for chest tightness and shortness of breath. Gastrointestinal: Negative for abdominal pain, diarrhea, nausea and vomiting. Genitourinary: Negative for dysuria, flank pain and hematuria. Musculoskeletal: Positive for gait problem. Negative for back pain and neck pain. Skin: Negative for rash. Allergic/Immunologic: Negative for immunocompromised state. Neurological: Negative for dizziness, speech difficulty, weakness and headaches. Hematological: Negative for adenopathy. Psychiatric/Behavioral: Negative for dysphoric mood and suicidal ideas. Physical Exam   Physical Exam  Vitals and nursing note reviewed. Constitutional:       General: He is not in acute distress. Appearance: Normal appearance. He is not ill-appearing or toxic-appearing. HENT:      Head: Normocephalic and atraumatic. Nose: Nose normal.      Mouth/Throat:      Mouth: Mucous membranes are moist.   Eyes:      Extraocular Movements: Extraocular movements intact. Right eye: Normal extraocular motion and no nystagmus. Left eye: Normal extraocular motion and no nystagmus. Pupils: Pupils are equal, round, and reactive to light. Cardiovascular:      Rate and Rhythm: Normal rate and regular rhythm. Pulses: Normal pulses. Pulmonary:      Effort: Pulmonary effort is normal. No respiratory distress. Breath sounds: Normal breath sounds. No stridor. No wheezing or rhonchi.    Abdominal:      General: Abdomen is flat. There is no distension. Palpations: There is no mass. Tenderness: There is no abdominal tenderness. Musculoskeletal:         General: Normal range of motion. Cervical back: Normal range of motion and neck supple. Right knee: Normal.      Left knee: Normal. No swelling, deformity, bony tenderness or crepitus. Normal range of motion. No tenderness. Normal alignment. Normal pulse. Left lower leg: No swelling. No edema. Left ankle: Normal. No tenderness. Normal range of motion. Normal pulse. Left Achilles Tendon: Normal.        Legs:    Skin:     General: Skin is warm and dry. Neurological:      General: No focal deficit present. Mental Status: He is alert and oriented to person, place, and time. Mental status is at baseline. Cranial Nerves: Cranial nerves are intact. Sensory: Sensation is intact. No sensory deficit. Motor: Motor function is intact. No weakness. Coordination: Coordination is intact. Coordination normal. Rapid alternating movements normal.      Gait: Gait is intact. Diagnostic Study Results     Labs -     Recent Results (from the past 24 hour(s))   CBC WITH AUTOMATED DIFF    Collection Time: 08/18/21 10:59 AM   Result Value Ref Range    WBC 7.3 4.1 - 11.1 K/uL    RBC 4.80 4. 10 - 5.70 M/uL    HGB 14.7 12.1 - 17.0 g/dL    HCT 43.2 36.6 - 50.3 %    MCV 90.0 80.0 - 99.0 FL    MCH 30.6 26.0 - 34.0 PG    MCHC 34.0 30.0 - 36.5 g/dL    RDW 11.6 11.5 - 14.5 %    PLATELET 857 359 - 599 K/uL    MPV 11.6 8.9 - 12.9 FL    NRBC 0.0 0  WBC    ABSOLUTE NRBC 0.00 0.00 - 0.01 K/uL    NEUTROPHILS 56 32 - 75 %    LYMPHOCYTES 35 12 - 49 %    MONOCYTES 7 5 - 13 %    EOSINOPHILS 1 0 - 7 %    BASOPHILS 1 0 - 1 %    IMMATURE GRANULOCYTES 0 0.0 - 0.5 %    ABS. NEUTROPHILS 4.1 1.8 - 8.0 K/UL    ABS. LYMPHOCYTES 2.5 0.8 - 3.5 K/UL    ABS. MONOCYTES 0.5 0.0 - 1.0 K/UL    ABS. EOSINOPHILS 0.1 0.0 - 0.4 K/UL    ABS.  BASOPHILS 0.1 0.0 - 0.1 K/UL ABS. IMM. GRANS. 0.0 0.00 - 0.04 K/UL    DF AUTOMATED     METABOLIC PANEL, COMPREHENSIVE    Collection Time: 08/18/21 10:59 AM   Result Value Ref Range    Sodium 135 (L) 136 - 145 mmol/L    Potassium 4.1 3.5 - 5.1 mmol/L    Chloride 102 97 - 108 mmol/L    CO2 30 21 - 32 mmol/L    Anion gap 3 (L) 5 - 15 mmol/L    Glucose 283 (H) 65 - 100 mg/dL    BUN 6 6 - 20 MG/DL    Creatinine 0.87 0.70 - 1.30 MG/DL    BUN/Creatinine ratio 7 (L) 12 - 20      GFR est AA >60 >60 ml/min/1.73m2    GFR est non-AA >60 >60 ml/min/1.73m2    Calcium 8.6 8.5 - 10.1 MG/DL    Bilirubin, total 0.5 0.2 - 1.0 MG/DL    ALT (SGPT) 27 12 - 78 U/L    AST (SGOT) 15 15 - 37 U/L    Alk. phosphatase 104 45 - 117 U/L    Protein, total 7.5 6.4 - 8.2 g/dL    Albumin 3.2 (L) 3.5 - 5.0 g/dL    Globulin 4.3 (H) 2.0 - 4.0 g/dL    A-G Ratio 0.7 (L) 1.1 - 2.2     TROPONIN I    Collection Time: 08/18/21 10:59 AM   Result Value Ref Range    Troponin-I, Qt. <0.05 <0.05 ng/mL   EKG, 12 LEAD, INITIAL    Collection Time: 08/18/21 12:11 PM   Result Value Ref Range    Ventricular Rate 80 BPM    Atrial Rate 80 BPM    P-R Interval 192 ms    QRS Duration 84 ms    Q-T Interval 382 ms    QTC Calculation (Bezet) 440 ms    Calculated P Axis 44 degrees    Calculated R Axis -8 degrees    Calculated T Axis 0 degrees    Diagnosis       Normal sinus rhythm  Nonspecific T wave abnormality  When compared with ECG of 16-AUG-2021 09:34,  premature ventricular complexes are no longer present  Nonspecific T wave abnormality has replaced inverted T waves in Inferior   leads  Confirmed by Carlos Alberto To ([id number]) on 8/18/2021 1:52:43 PM         Radiologic Studies -   XR TIB/FIB LT   Final Result   No acute abnormality. XR KNEE LT 3 V   Final Result   No acute abnormality. CT Results  (Last 48 hours)    None        CXR Results  (Last 48 hours)    None          Medical Decision Making   I am the first provider for this patient.     I reviewed the vital signs, available nursing notes, past medical history, past surgical history, family history and social history. Vital Signs-Reviewed the patient's vital signs. Patient Vitals for the past 24 hrs:   Temp Pulse Resp BP SpO2   08/18/21 1032 98.3 °F (36.8 °C) 92 18 (!) 152/118 100 %       EKG interpretation: (Preliminary)  Normal sinus rhythm, nonspecific T wave abnormality, not significantly changed compared to previous EKG from August 16, 2021. Normal QTC. EKG interpretation by Lynda Chew MD    Records Reviewed: Nursing Notes, Old Medical Records, Previous Radiology Studies and Previous Laboratory Studies    Provider Notes (Medical Decision Making): On exam, patient is well-appearing, in no acute distress. He has no evidence of nystagmus. Patient reports no symptoms of dizziness at this time, even with head movement and ambulation. Patient was ambulated in the ED room, demonstrating steady gait without incoordination, or instability. He has no focal neurologic deficits on my exam.  His previous hospitalization, as well as head imaging was reviewed by myself extensively-noted all studies being negative for acute intracranial findings. Based on my exam, patient's gait disturbance seems to be more related to his left lower extremity injury. He has not had previous imaging for this, will obtain x-ray of his left knee as well as left tib-fib to rule out for bony pathology. X-rays are negative. His other work-up in the ED including EKG, troponin, basic labs are all largely unremarkable and noncontributory to his presentation today. As patient was able to demonstrate steady independent gait in the ED, he is stable for discharge. He is also currently not vertiginous, and it appears that meclizine is actually helping with his vertigo. Patient is requesting refill for meclizine, which is provided upon discharge today. He is given follow-up information for physical therapy as patient may benefit from vestibular rehab.   He felt comfortable with discharge at this time. Advised to follow-up with his PCP. Return precautions discussed. Merrily Burkitt, MD      Disposition:  Discharge      DISCHARGE PLAN:  1. Discharge Medication List as of 8/18/2021  1:45 PM      CONTINUE these medications which have NOT CHANGED    Details   meclizine (ANTIVERT) 25 mg tablet Take 1 tab TID X 2 days then TID PRN, Normal, Disp-30 Tablet, R-0      Janumet 50-1,000 mg per tablet TAKE 1 TABLET BY MOUTH TWICE DAILY WITH MEALS, Normal, Disp-60 Tablet, R-0Need appointment. atorvastatin (LIPITOR) 40 mg tablet TAKE 1 TABLET BY MOUTH DAILY, Normal, Disp-90 Tablet, R-0Need appointment. glipiZIDE (GLUCOTROL) 10 mg tablet TAKE 1 TABLET BY MOUTH TWICE DAILY, Normal, Disp-90 Tab, R-0      insulin nph-regular human rec (HUMULIN 70-30) 100 unit/mL (70-30) inpn 12 Units by SubCUTAneous route two (2) times daily (with meals). , Normal, Disp-18 Adjustable Dose Pre-filled Pen Syringe, R-3      Insulin Needles, Disposable, 31 gauge x 5/16\" ndle Use once/day, Normal, Disp-1 Package, R-11      Insulin Needles, Disposable, (Esther Pen Needle) 32 gauge x 5/32\" ndle 1 Units by SubCUTAneous route daily. , Normal, Disp-3 Pen Needle,R-6      lancets (ONETOUCH DELICA LANCETS) 30 gauge misc Check blood sugar in AM ( fasting ) and 2 hours after dinner., Normal, Disp-100 Lancet, R-11      Blood-Glucose Meter (ONETOUCH ULTRA2 METER) monitoring kit Check blood sugar in AM ( fasting ) and 2 hours after dinner., Normal, Disp-1 Kit, R-0      glucose blood VI test strips (ONETOUCH ULTRA TEST) strip Check blood sugar in AM ( fasting ) and 2 hours after dinner., Normal, Disp-100 Strip, R-11           2.    Follow-up Information     Follow up With Specialties Details [address]    [address] [address] [address] [facility]  [phone], Earnestine Morrow, [address] [place] [address] [address] [place], [place] [id number] [phone]          3. Return to ED if worse     Diagnosis     Clinical Impression:   1. Vertigo    2. Abnormal gait        Attestations:    Dannie Rey MD    Please note that this dictation was completed with [employer], the computer voice recognition software. Quite often unanticipated grammatical, syntax, homophones, and other interpretive errors are inadvertently transcribed by the computer software. Please disregard these errors. Please excuse any errors that have escaped final proofreading. Thank you.

## 2021-08-18 NOTE — DISCHARGE SUMMARY
Discharge Summary       PATIENT ID: Jyotsna Wheeler  MRN: [id number]   YOB: 1965    DATE OF ADMISSION: 8/16/2021  9:55 AM    DATE OF DISCHARGE: 8/17/21  PRIMARY CARE PROVIDER: Dolores Short MD       DISCHARGING PROVIDER: Paulette Mendez MD    To contact this individual call [phone] and ask the  to page. If unavailable ask to be transferred the Adult Hospitalist Department. CONSULTATIONS: IP CONSULT TO NEUROLOGY  IP CONSULT TO NEUROLOGY      Hospital Course and Discharge Diagnoses      As per HPI: Jyotsna Wheeler is a 55 y.o. male who presents with dizziness     Patient was seen with similar symptoms and evaluated by the hospitalist team and discharged on 8/10/2021 after symptoms improved. Patient reports that after he went home he continued to be dizzy, felt unbalanced and could not walk. Patient had an MRI done of the brain which were essentially negative for acute CVA on his prior admission, his symptoms were considered to be secondary to BPPV and he was started on meclizine which the patient has been taking. Patient denies any other complaints or problems. Patient came to the ER today and was requested to be admitted to the hospitalist service. Patient reports that he has not been vaccinated for COVID-19     The patient denies any Headache, blurry vision, sore throat, trouble swallowing, trouble with speech, chest pain, SOB, cough, fever, chills, N/V/D, abd pain, urinary symptoms, constipation, recent travels, sick contacts  falls, injuries, rashes, contact with COVID-19 diagnosed patients, hematemesis, melena, hemoptysis, hematuria, rashes, denies starting any new medications and denies any other concerns or problems besides as mentioned above. Hospital Course:   Patient was started on telemetry, neurology consult was requested. Telemetry did not show any evidence of arrhythmias. Patient was evaluated by neurology team, and MRI brain was recommended.    On my encounter, patient was frustrated that was etiology of his symptoms has not been determined so far. I educated the patient that diagnostic work-up is in process and once diagnostic work-up will be completed, then will be able to establish any etiology. He agreed to finish the diagnostic work-up, however left AGAINST MEDICAL ADVICE later in the day.     Diagnoses  Dizziness persistent  Diabetes mellitus type 2 poorly controlled  Hypertension  Hyperlipidemia    CHRONIC MEDICAL DIAGNOSES:  Problem List as of 8/17/2021 Date Reviewed: 12/30/2020        Codes Class Noted - Resolved    CVA (cerebral vascular accident) [facility]) ICD-10-CM: I63.9  ICD-9-CM: 434.91  8/16/2021 - Present        Dizziness ICD-10-CM: R42  ICD-9-CM: 780.4  8/10/2021 - Present        Obesity, morbid ([facility] ICD-10-CM: E66.01  ICD-9-CM: 278.01  5/1/2018 - Present        Non compliance w medication regimen ICD-10-CM: Z91.14  ICD-9-CM: V15.81  9/30/2016 - Present        Essential hypertension ICD-10-CM: I10  ICD-9-CM: 401.9  2/26/2016 - Present        Hyperlipidemia with target LDL less than 70 ICD-10-CM: E78.5  ICD-9-CM: 272.4  9/28/2015 - Present        Type 2 diabetes mellitus without complication ([facility]-F[facility]: E11.9  ICD-9-CM: 250.00  9/28/2015 - Present        Abscess of bursa of left knee ICD-10-CM: M71.062  ICD-9-CM: 727.89  6/10/2013 - Present        Cellulitis ICD-10-CM: L03.90  ICD-9-CM: 682.9  8/21/2012 - Present                  Signed:   Luis Enrique Batres MD  8/17/2021  8:21 PM

## 2021-09-02 ENCOUNTER — HOSPITAL ENCOUNTER (OUTPATIENT)
Dept: PHYSICAL THERAPY | Age: 56
Discharge: HOME | End: 2021-09-02
Payer: COMMERCIAL

## 2021-09-02 PROCEDURE — 97112 NEUROMUSCULAR REEDUCATION: CPT

## 2021-09-02 PROCEDURE — 97162 PT EVAL MOD COMPLEX 30 MIN: CPT

## 2021-09-02 PROCEDURE — 97110 THERAPEUTIC EXERCISES: CPT

## 2021-09-02 NOTE — PROGRESS NOTES
PT INITIAL EVALUATION NOTE - [facility] 2-15    Patient Name: Diane Kay \"Vasu\"  Date:2021  : 1965  [x]  Patient  Verified  Payor: Liza Kinney / Plan: Carlee Light [id number] PPO / Product Type: PPO /    In time: 4928  Out time: 1135  Total Treatment Time (min): 59  Total Timed Codes (min): 59  1:1 Treatment Time ( only): 30   Visit #: 1     Treatment Area: Pain in left leg [M79.605]  Pain in left arm [M79.602]    SUBJECTIVE  Pain Level (0-10 scale): 0  Any medication changes, allergies to medications, adverse drug reactions, diagnosis change, or new procedure performed?: [] No    [x] Yes (see summary sheet for update)  Subjective:      *The patient is R hand dominant*    The patient reports insidious onset of L-sided weakness/dizziness/balance deficits with walking leading him to lose his balance in grocery store several weeks ago; he was throwing up for two days at that point. He notes he went to ED 21, where head/neck CT/MRIs (-) for active CVA or further pathology, and was diagnosed with BPPV. He was discharged from ED 21, and subsequently fell in grocery store parking lot. He completed meclizine dose pack, which he notes may have helped; however, he notes he feels he would have improved either way, and is doing better now than three weeks ago. He continues to have difficulty with fine motor tasks and grading force on L UE, and feels he drags his L toe occasionally when walking. He returned to work as a  this past Monday; however has not power washed nor lifted items in [employer] since that time. He notes he fatigues more rapidly than usual at work. Current functional limitations include: \"I get tired quickly just trying to walk straight, without a limp! \" Goals: \"Walking right! \"    OBJECTIVE/EXAMINATION    Posture: Narrow base of support, increased bilateral knee varus, increased bilateral LE supination, increased bilateral hip flexion, forward head posture, R shoulder lower than L, increased L arm window, bilateral scapular winging  Other Observations:   Squat: Narrow base of support, increased lumbar flexion, reaches with R hand only to complete task  Gait and Functional Mobility: The patient ambulates with decreased monalisa, R trunk lean, decreased base of support with scissoring gait pattern, gait antalgia with decreased L LE stance time, noted decreased L LE clearance during swing phase of gait  Vitals: /96 HR 88 at arrival    Flexibility: Noted grossly decreased bilateral hamstring/hip flexor muscle length with sitting/standing/mat table assessments    LOWER QUARTER   MUSCLE STRENGTH  KEY       R  L  0 - No Contraction  Knee ext  4+  4  1 - Trace            flex  5  4 (noted onset of L LE tremor)  2 - Poor   Hip ext   4  4-  3 - Fair          flex   4+  4  4 - Good         abd  4+  3+  5 - Normal         add  4  4-      Ankle DF  5  5                PF  See heel raise test below                INV  4+  4+                EV  4+  4+    Heel Raise Test: R = 16 repetitions; L = 5 repetitions (rapidly decreased heel height with repetition). Noted intermittent losses of balance to L throughout. Neurological: Reflexes / Sensations: Dermatomes WNL to light touch throughout bilateral LEs.  Noted (+) L heel to shin test, (-) L dysmetria (UE point-to-point), (-) L LE clonus/spasticity testing    Functional Tests:  Single Limb Balance: eyes open R = 7.5 seconds; L = 2.7 seconds  Tandem Stance: R posterior = 20 seconds; L posterior = 10 seconds at close (S) level of assistance  MARQUEZ Balance Scale: 47/56 (see hard chart for item details)  30 Second Sit to Stand Test: 9 repetitions (without use of UEs)    15 min Therapeutic Exercise:  [x] See flow sheet :   Rationale: increase ROM, increase strength, improve coordination, improve balance and increase proprioception to improve the patients ability to walk and work without fear of falling     15 min Neuromuscular Re-education:  []  See flow sheet : Includes time spent on MARQUEZ Balance Scale assessment   Rationale: increase ROM, increase strength, improve coordination, improve balance and increase proprioception  to improve the patients ability to walk and work without fear of falling          With   [x] TE   [] TA   [x] Neuro   [] SC   [] other: Patient Education: [x] Review HEP    [] Progressed/Changed HEP based on:   [] positioning   [] body mechanics   [] transfers   [] heat/ice application    [] other:      Other Objective/Functional Measures: FOTO Functional Measure: 52/100                Pain Level (0-10 scale) post treatment: 0    ASSESSMENT/Changes in Function:     [x]  See Plan of [address] [employer] Lanette, PT, DPT 9/2/2021

## 2021-09-02 NOTE — PROGRESS NOTES
[address] Physical Therapy  [address] [facility] [place] [facility] ([address] [place]  Phone: [phone] Fax: [phone]    Plan of Care/Statement of Necessity for Physical Therapy Services  2-15    Patient name: Kelly Lazo  : 1965  Provider#: [id number]  Referral source: Mallory Gomez MD      Medical/Treatment Diagnosis: Pain in left leg [M79.605]  Pain in left arm [M79.602]     Prior Hospitalization: see medical history     Comorbidities: Hypertension, hyperlipidemia, diabetes, history of BPPV, obesity  Prior Level of Function: Independent, active work  Medications: Verified on Patient Summary List    Start of Care: 21      Onset Date: 21 (ED admission)       The Plan of Care and following information is based on the information from the initial evaluation. Assessment/ key information:     The patient is a 55year old male who presents to physical therapy services due to L arm and leg weakness/coordination deficits with associated balance and gait impairments. He demonstrates decreased LE coordination, decreased L posterior chain strength and endurance (heel raise test: R = 16 repetitions; L = 5 repetitions through limited ROM), decreased single limb postural control (R = 7.5 seconds; L = 2.7 seconds), decreased endurance with transfers (9 repetitions during 30 Second Sit To Stand Test), decreased static and dynamic postural control in positions of decreased base of support (MARQUEZ Balance Scale: 47/56), and aberrant movement patterns with squatting and ambulation. Plan to further assess potential contribution of vestibular dysfunction at first follow up visit when patient has family member present to safely drive him home as needed.  The patient would benefit from continued skilled physical therapy services to improve safety/endurance/independence with functional tasks such as walking and work activities; patient may additionally benefit from neurologist referral for further testing to rule out CNS/PNS contributions to current condition. Evaluation Complexity History HIGH Complexity :3+ comorbidities / personal factors will impact the outcome/ POC ; Examination HIGH Complexity : 4+ Standardized tests and measures addressing body structure, function, activity limitation and / or participation in recreation  ;Presentation MEDIUM Complexity : Evolving with changing characteristics  ; Clinical Decision Making MEDIUM Complexity : FOTO score of 26-74  Overall Complexity Rating: MEDIUM    Problem List: decrease ROM, decrease strength, edema affecting function, impaired gait/ balance, decrease ADL/ functional abilitiies, decrease activity tolerance, decrease flexibility/ joint mobility and decrease transfer abilities   Treatment Plan may include any combination of the following: Therapeutic exercise, Therapeutic activities, Neuromuscular re-education, Physical agent/modality, Gait/balance training, Manual therapy, Patient education, Self Care training, Functional mobility training, Home safety training, Stair training and Other: Dry Needling  Patient / Family readiness to learn indicated by: asking questions, trying to perform skills and interest  Persons(s) to be included in education: patient (P)  Barriers to Learning/Limitations: None  Patient Goal (s): Walking right!   Patient Self Reported Health Status: good  Rehabilitation Potential: good    Short Term Goals: To be accomplished in 4-6 treatments: The patient will demonstrate understanding of and compliance with updated and progressive HEP toward improved participation in physical therapy plan of care. The patient will demonstrate bilateral single limb postural control >= 10 seconds toward improved safety with functional tasks such as walking and work activities.               The patient will demonstrate ability to complete >= 11 repetitions during 30 Second Sit to Stand Test toward improved endurance with transfers. Long Term Goals: To be accomplished in 8-12 treatments: The patient will demonstrate multiplanar L hip strength increased by >= 1/2 MMT grade toward improved endurance with functional activities such as walking and work tasks. The patient will demonstrate MARQUEZ Balance Scale Score >= 51/56 toward improved safety with walking and work tasks. The patient will score >= 70 on FOTO to demonstrate significantly improved subjective report of function. Frequency / Duration: Patient to be seen 1-2 times per week for 8-12 treatments. Patient/ Caregiver education and instruction: self care, activity modification and exercises    [x]  Plan of care has been reviewed with ASHLEY Singh, PT, DPT 9/2/2021     ________________________________________________________________________    I certify that the above Therapy Services are being furnished while the patient is under my care. I agree with the treatment plan and certify that this therapy is necessary.     [de-identified] Signature:____________________  Date:____________Time: _________     Guevara Gilman MD

## 2021-09-08 ENCOUNTER — HOSPITAL ENCOUNTER (OUTPATIENT)
Dept: PHYSICAL THERAPY | Age: 56
Discharge: HOME | End: 2021-09-08
Payer: COMMERCIAL

## 2021-09-08 PROCEDURE — 97110 THERAPEUTIC EXERCISES: CPT

## 2021-09-08 PROCEDURE — 97112 NEUROMUSCULAR REEDUCATION: CPT

## 2021-09-08 NOTE — PROGRESS NOTES
PT DAILY TREATMENT NOTE - [facility] 2-15    Patient Name: Antonio Bernard  Date:2021  : 1965  [x]  Patient  Verified  Payor: Josue Solano / Plan: [facility] PPO / Product Type: PPO /    In time: 1030  Out time: 1120  Total Treatment Time (min): 50  Total Timed Codes (min): 50  1:1 Treatment Time ( only): 48   Visit #: 2    *Patient's wife (Ritesh) present throughout visit today*    Treatment Area: Pain in left leg [M79.605]  Pain in left arm [M79.602]    SUBJECTIVE  Pain Level (0-10 scale): 0  Any medication changes, allergies to medications, adverse drug reactions, diagnosis change, or new procedure performed?: [x] No    [] Yes (see summary sheet for update)  Subjective functional status/changes:   [] No changes reported    The patient reports he power washed for 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 He has been working regularly on HEP, which continues to challenge him. 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 OBJECTIVE    30 min Therapeutic Exercise:  [x] See flow sheet : Includes time spent educating patient and his wife on results of initial evaluation/continued vestibular assessment regarding pathophysiology of current condition and prognosis. Patient and his wife verbalizing and demonstrating understanding of provided education with 100% accuracy using teach back method.    Rationale: increase ROM, increase strength, improve coordination, improve balance and increase proprioception to improve the patients ability to walk and work without fear of falling     20 min Neuromuscular Re-education:  [x]  See flow sheet : Includes time spent on vestibular education/assessment and dynamic postural control interventions   Rationale: increase ROM, increase strength, improve coordination, improve balance and increase proprioception  to improve the patients ability to walk and work without fear of falling          With   [x] TE   [] TA   [x] Neuro   [] SC   [] other: Patient Education: [x] Review HEP    [] Progressed/Changed HEP based on:   [] positioning   [] body mechanics   [] transfers   [] heat/ice application    [] other:      Other Objective/Functional Measures: (-) Shin-Hallpike bilaterally     Pain Level (0-10 scale) post treatment: 0    ASSESSMENT/Changes in Function:   The patient tolerated therapy visit well at this date. He demonstrates improved gait mechanics at arrival and throughout visit, however noted decreased L LE stance time and continued trunk lean. Patient demonstrates (-) Allen-Hallpike testing bilaterally today, without evidence of nystagmus/dizziness/nausea/etc. Patient challenged to maintain tempo and technique with introduction of march + cable column extensions due to coordination deficits. Patient fatigues rapidly due to endurance deficits, particularly during second set of exercise. Noted several small stumbles from which patient was able to self-recover during dynamic postural control intervention, particularly with retro walking under fatigued conditions. Patient requires CGA-Min A throughout all dynamic postural control intervention due to safety concerns. Patient very fatigued at end of session today. Continue to progress as tolerated. Patient will continue to benefit from skilled PT services to modify and progress therapeutic interventions, address functional mobility deficits, address ROM deficits, address strength deficits, analyze and address soft tissue restrictions, analyze and cue movement patterns, analyze and modify body mechanics/ergonomics, assess and modify postural abnormalities, address imbalance/dizziness and instruct in home and community integration to attain remaining goals.      [x]  See Plan of Care  []  See progress note/recertification  []  See Discharge Summary         Progress towards goals / Updated goals:    Short Term Goals: To be accomplished in 4-6 treatments: The patient will demonstrate understanding of and compliance with updated and progressive HEP toward improved participation in physical therapy plan of care. - Progressing              The patient will demonstrate bilateral single limb postural control >= 10 seconds toward improved safety with functional tasks such as walking and work activities. - Progressing              The patient will demonstrate ability to complete >= 11 repetitions during 30 Second Sit to Stand Test toward improved endurance with transfers. - Progressing  Long Term Goals: To be accomplished in 8-12 treatments: The patient will demonstrate multiplanar L hip strength increased by >= 1/2 MMT grade toward improved endurance with functional activities such as walking and work tasks. - Progressing              The patient will demonstrate MARQUEZ Balance Scale Score >= 51/56 toward improved safety with walking and work tasks. - Progressing              The patient will score >= 70 on FOTO to demonstrate significantly improved subjective report of function. - Progressing  Frequency / Duration: Patient to be seen 1-2 times per week for 8-12 treatments.     PLAN  [x]  Upgrade activities as tolerated     [x]  Continue plan of care  [x]  Update interventions per flow sheet       []  Discharge due to:_  []  Other:_      Michi Rodriguez, PT, DPT 9/8/2021

## 2021-09-10 ENCOUNTER — HOSPITAL ENCOUNTER (OUTPATIENT)
Dept: PHYSICAL THERAPY | Age: 56
Discharge: HOME | End: 2021-09-10
Payer: COMMERCIAL

## 2021-09-10 PROCEDURE — 97112 NEUROMUSCULAR REEDUCATION: CPT

## 2021-09-10 PROCEDURE — 97110 THERAPEUTIC EXERCISES: CPT

## 2021-09-10 NOTE — PROGRESS NOTES
PT DAILY TREATMENT NOTE - [facility] 2-15    Patient Name: Darryle Jetty  Date:9/10/2021  : 1965  [x]  Patient  Verified  Payor: Florence Laci / Plan: [employer]lee [employer] [id number] PPO / Product Type: PPO /    In time:1007  Out time:1050  Total Treatment Time (min): 43  Total Timed Codes (min): 43  1:1 Treatment Time ( only): 37   Visit #:  3    Treatment Area: Pain in left leg [M79.605]  Pain in left arm [M79.602]    SUBJECTIVE  Pain Level (0-10 scale): 0/10  Any medication changes, allergies to medications, adverse drug reactions, diagnosis change, or new procedure performed?: [x] No    [] Yes (see summary sheet for update)  Subjective functional status/changes:   [] No changes reported  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 He has still been feeling unsteady on the LLE when on his feet most of the day. OBJECTIVE    30 min Therapeutic Exercise:  [x] See flow sheet :   Rationale: increase ROM and increase strength to improve the patients ability to perform ADLs and reduce fall risk    13 min Neuromuscular Re-education:  [x]  See flow sheet :   Rationale: increase ROM, increase strength, improve coordination, improve balance and increase proprioception  to improve the patients ability to perform ADLs and reduce fall risk    With   [] TE   [] TA   [] Neuro   [] SC   [] other: Patient Education: [x] Review HEP    [] Progressed/Changed HEP based on:   [] positioning   [] body mechanics   [] transfers   [] heat/ice application    [] other:      Other Objective/Functional Measures: none noted     Pain Level (0-10 scale) post treatment: 0/10    ASSESSMENT/Changes in Function:   Patient will continue to struggle with single leg stability. Fatigued quickly with heel raises. Tolerated all therex well, will continue to progress as tolerated.   Patient will continue to benefit from skilled PT services to modify and progress therapeutic interventions, address functional mobility deficits, address ROM deficits, address strength deficits, analyze and address soft tissue restrictions, analyze and cue movement patterns, analyze and modify body mechanics/ergonomics and assess and modify postural abnormalities to attain remaining goals. []  See Plan of Care  []  See progress note/recertification  []  See Discharge Summary         Progress towards goals / Updated goals:  Patient is progressing towards goals.      PLAN  [x]  Upgrade activities as tolerated     [x]  Continue plan of care  [x]  Update interventions per flow sheet       []  Discharge due to:_  []  Other:_      Gina Pecristiana, PTA 9/10/2021

## 2021-09-15 ENCOUNTER — HOSPITAL ENCOUNTER (OUTPATIENT)
Dept: PHYSICAL THERAPY | Age: 56
Discharge: HOME | End: 2021-09-15
Payer: COMMERCIAL

## 2021-09-15 PROCEDURE — 97112 NEUROMUSCULAR REEDUCATION: CPT

## 2021-09-15 PROCEDURE — 97110 THERAPEUTIC EXERCISES: CPT

## 2021-09-15 NOTE — PROGRESS NOTES
PT DAILY TREATMENT NOTE - [facility] 2-15    Patient Name: Jovany Russell  Date:9/15/2021  : 1965  [x]  Patient  Verified  Payor: BLUE CROSS / Plan: [employer]lee Light [id number] PPO / Product Type: PPO /    In time:830  Out time:925  Total Treatment Time (min): 55  Total Timed Codes (min): 55  1:1 Treatment Time ( only): 54   Visit #:  4    Treatment Area: Pain in left leg [M79.605]  Pain in left arm [M79.602]    SUBJECTIVE  Pain Level (0-10 scale): 0/10  Any medication changes, allergies to medications, adverse drug reactions, diagnosis change, or new procedure performed?: [x] No    [] Yes (see summary sheet for update)  Subjective functional status/changes:   [] No changes reported  Patient reports he has been fatiguing very quickly at work. OBJECTIVE    40 min Therapeutic Exercise:  [x] See flow sheet :   Rationale: increase ROM and increase strength to improve the patients ability to perform ADLs and reduce fall risk    15 min Neuromuscular Re-education:  [x]  See flow sheet :   Rationale: increase ROM, increase strength, improve coordination, improve balance and increase proprioception  to improve the patients ability to perform ADLs and reduce fall risk    With   [] TE   [] TA   [] Neuro   [] SC   [] other: Patient Education: [x] Review HEP    [] Progressed/Changed HEP based on:   [] positioning   [] body mechanics   [] transfers   [] heat/ice application    [] other:      Other Objective/Functional Measures: none noted     Pain Level (0-10 scale) post treatment: 0/10    ASSESSMENT/Changes in Function:   Patient struggled with weighted variables when performing dynamic stability based therex. Fatigued quickly during stability on the LLE. Tolerated all therex, will continue to progress as tolerated.    Patient will continue to benefit from skilled PT services to modify and progress therapeutic interventions, address functional mobility deficits, address ROM deficits, address strength deficits, analyze and address soft tissue restrictions, analyze and cue movement patterns, analyze and modify body mechanics/ergonomics and assess and modify postural abnormalities to attain remaining goals. []  See Plan of Care  []  See progress note/recertification  []  See Discharge Summary         Progress towards goals / Updated goals:  Patient is progressing towards goals.      PLAN  [x]  Upgrade activities as tolerated     [x]  Continue plan of care  [x]  Update interventions per flow sheet       []  Discharge due to:_  []  Other:_      Andrew Montano, ASHLEY 9/15/2021

## 2021-09-17 ENCOUNTER — APPOINTMENT (OUTPATIENT)
Dept: PHYSICAL THERAPY | Age: 56
End: 2021-09-17
Payer: COMMERCIAL

## 2021-09-22 ENCOUNTER — APPOINTMENT (OUTPATIENT)
Dept: PHYSICAL THERAPY | Age: 56
End: 2021-09-22
Payer: COMMERCIAL

## 2021-09-24 ENCOUNTER — HOSPITAL ENCOUNTER (OUTPATIENT)
Dept: PHYSICAL THERAPY | Age: 56
Discharge: HOME | End: 2021-09-24
Payer: COMMERCIAL

## 2021-09-24 PROCEDURE — 97112 NEUROMUSCULAR REEDUCATION: CPT

## 2021-09-24 PROCEDURE — 97110 THERAPEUTIC EXERCISES: CPT

## 2021-09-24 NOTE — PROGRESS NOTES
PT DAILY TREATMENT NOTE - [facility] 2-15    Patient Name: Soledad Gomez  Date:2021  : 1965  [x]  Patient  Verified  Payor: BLUE CROSS / Plan: Carlee Light [id number] PPO / Product Type: PPO /    In time:917 Out time:1007  Total Treatment Time (min): 50  Total Timed Codes (min): 50  1:1 Treatment Time ([facility] only): 50   Visit #:  5    Treatment Area: Pain in left leg [M79.605]  Pain in left arm [M79.602]    SUBJECTIVE  Pain Level (0-10 scale): 0/10  Any medication changes, allergies to medications, adverse drug reactions, diagnosis change, or new procedure performed?: [x] No    [] Yes (see summary sheet for update)  Subjective functional status/changes:   [] No changes reported  Patient reports work has been exhausting and he is not sure how much more of it he is willing to do. He is interested in returning to the gym. OBJECTIVE    40 min Therapeutic Exercise:  [x] See flow sheet :   Rationale: increase ROM and increase strength to improve the patients ability to perform ADLs and reduce fall risk    10 min Neuromuscular Re-education:  [x]  See flow sheet :   Rationale: increase ROM, increase strength, improve coordination, improve balance and increase proprioception  to improve the patients ability to perform ADLs and reduce fall risk    With   [] TE   [] TA   [] Neuro   [] SC   [] other: Patient Education: [x] Review HEP    [] Progressed/Changed HEP based on:   [] positioning   [] body mechanics   [] transfers   [] heat/ice application    [] other:      Other Objective/Functional Measures: none noted     Pain Level (0-10 scale) post treatment: 0/10    ASSESSMENT/Changes in Function:    Much improved with power ups on the 6 inch step. Struggled with stability on the LLE. Tolerated all interventions well, will continue to progress as tolerated.    Patient will continue to benefit from skilled PT services to modify and progress therapeutic interventions, address functional mobility deficits, address ROM deficits, address strength deficits, analyze and address soft tissue restrictions, analyze and cue movement patterns, analyze and modify body mechanics/ergonomics and assess and modify postural abnormalities to attain remaining goals. []  See Plan of Care  []  See progress note/recertification  []  See Discharge Summary         Progress towards goals / Updated goals:  Patient is progressing towards goals.      PLAN  [x]  Upgrade activities as tolerated     [x]  Continue plan of care  [x]  Update interventions per flow sheet       []  Discharge due to:_  []  Other:_      Nguyen Montano, PTA 9/24/2021

## 2021-10-01 ENCOUNTER — HOSPITAL ENCOUNTER (OUTPATIENT)
Dept: PHYSICAL THERAPY | Age: 56
Discharge: HOME | End: 2021-10-01
Payer: COMMERCIAL

## 2021-10-01 PROCEDURE — 97112 NEUROMUSCULAR REEDUCATION: CPT

## 2021-10-01 PROCEDURE — 97110 THERAPEUTIC EXERCISES: CPT

## 2021-10-01 NOTE — PROGRESS NOTES
PT DAILY TREATMENT NOTE - [facility] 2-15    Patient Name: Halie Camp  Date:10/1/2021  : 1965  [x]  Patient  Verified  Payor: BLUE CROSS / Plan: Carlee Light [id number] PPO / Product Type: PPO /    In time:831 Out time:913  Total Treatment Time (min): 42  Total Timed Codes (min): 33  1:1 Treatment Time ( only): 33   Visit #:  6    Treatment Area: Pain in left leg [M79.605]  Pain in left arm [M79.602]    SUBJECTIVE  Pain Level (0-10 scale): 0/10  Any medication changes, allergies to medications, adverse drug reactions, diagnosis change, or new procedure performed?: [x] No    [] Yes (see summary sheet for update)  Subjective functional status/changes:   [] No changes reported  Patient reports after around 8 hours of work he still feels quite unsteady in the LLE. OBJECTIVE    32 min Therapeutic Exercise:  [x] See flow sheet :   Rationale: increase ROM and increase strength to improve the patients ability to perform ADLs and reduce fall risk    10 min Neuromuscular Re-education:  [x]  See flow sheet :   Rationale: increase ROM, increase strength, improve coordination, improve balance and increase proprioception  to improve the patients ability to perform ADLs and reduce fall risk    With   [] TE   [] TA   [] Neuro   [] SC   [] other: Patient Education: [x] Review HEP    [] Progressed/Changed HEP based on:   [] positioning   [] body mechanics   [] transfers   [] heat/ice application    [] other:      Other Objective/Functional Measures: none noted     Pain Level (0-10 scale) post treatment: 0/10    ASSESSMENT/Changes in Function:    Tolerated all interventions well, will continue to progress as tolerated.    Patient will continue to benefit from skilled PT services to modify and progress therapeutic interventions, address functional mobility deficits, address ROM deficits, address strength deficits, analyze and address soft tissue restrictions, analyze and cue movement patterns, analyze and modify body mechanics/ergonomics and assess and modify postural abnormalities to attain remaining goals. []  See Plan of Care  []  See progress note/recertification  []  See Discharge Summary         Progress towards goals / Updated goals:  Patient is progressing towards goals.      PLAN  [x]  Upgrade activities as tolerated     [x]  Continue plan of care  [x]  Update interventions per flow sheet       []  Discharge due to:_  []  Other:_      Martin Parish, PTA 10/1/2021

## 2021-10-06 ENCOUNTER — APPOINTMENT (OUTPATIENT)
Dept: PHYSICAL THERAPY | Age: 56
End: 2021-10-06
Payer: COMMERCIAL

## 2021-10-08 ENCOUNTER — HOSPITAL ENCOUNTER (OUTPATIENT)
Dept: PHYSICAL THERAPY | Age: 56
Discharge: HOME | End: 2021-10-08
Payer: COMMERCIAL

## 2021-10-08 PROCEDURE — 97535 SELF CARE MNGMENT TRAINING: CPT

## 2021-10-08 NOTE — PROGRESS NOTES
PT DAILY TREATMENT NOTE - [facility] 2-15    Patient Name: Maico Huizar  Date:10/8/2021  : 1965  [x]  Patient  Verified  Payor: Duane Moshe / Plan: Carlee Light [id number] PPO / Product Type: PPO /    In time:830 Out time:940  Total Treatment Time (min): 70  Total Timed Codes (min): 46  1:1 Treatment Time ( only): 55  Visit #:  7    Treatment Area: Pain in left leg [M79.605]  Pain in left arm [M79.602]    SUBJECTIVE  Pain Level (0-10 scale): 0/10  Any medication changes, allergies to medications, adverse drug reactions, diagnosis change, or new procedure performed?: [x] No    [] Yes (see summary sheet for update)  Subjective functional status/changes:   [] No changes reported  Patient reports he has limited certain activities at his job. Patient will report he will fatigue quickly in his LE still and will begin to stumble or drift towards one side. Patient reports he is ambulating stairs in a reciprocal pattern. Fatigue is continuing to be the most difficult aspect. OBJECTIVE    30 min Therapeutic Exercise:  [x] See flow sheet :   Rationale: increase ROM and increase strength to improve the patients ability to perform ADLs and reduce fall risk    nt min Neuromuscular Re-education:  [x]  See flow sheet :   Rationale: increase ROM, increase strength, improve coordination, improve balance and increase proprioception  to improve the patients ability to perform ADLs and reduce fall risk    40 min Self Care/Home Management:  []  See flow sheet :   Rationale: increase ROM and increase strength  to improve the patients ability to perform ADLs and reduce pain levels    With   [] TE   [] TA   [] Neuro   [] SC   [] other: Patient Education: [x] Review HEP    [] Progressed/Changed HEP based on:   [] positioning   [] body mechanics   [] transfers   [] heat/ice application    [] other:      Other Objective/Functional Measures:    FOTO:51  Single Limb Balance: eyes open R = 18 seconds; L = 6 seconds  Tandem Stance: R posterior = 30 seconds; L posterior = 22 seconds at close (S) level of assistance  MARQUEZ Balance Scale: 53/56 (see hard chart for item details)  30 Second Sit to Stand Test: 10 repetitions (without use of UEs)     Pain Level (0-10 scale) post treatment: 0/10    ASSESSMENT/Changes in Function:     Will continue to progress focusing on more functional movements and over all stability. Patient will continue to benefit from skilled PT services to modify and progress therapeutic interventions, address functional mobility deficits, address ROM deficits, address strength deficits, analyze and address soft tissue restrictions, analyze and cue movement patterns, analyze and modify body mechanics/ergonomics and assess and modify postural abnormalities to attain remaining goals. []  See Plan of Care  []  See progress note/recertification  []  See Discharge Summary         Progress towards goals / Updated goals:  Short Term Goals: To be accomplished in 4-6 treatments: The patient will demonstrate understanding of and compliance with updated and progressive HEP toward improved participation in physical therapy plan of care. Met               The patient will demonstrate bilateral single limb postural control >= 10 seconds toward improved safety with functional tasks such as walking and work activities. Progressing towards              The patient will demonstrate ability to complete >= 11 repetitions during 30 Second Sit to Stand Test toward improved endurance with transfers. Progressing towards  Long Term Goals: To be accomplished in 8-12 treatments: The patient will demonstrate multiplanar L hip strength increased by >= 1/2 MMT grade toward improved endurance with functional activities such as walking and work tasks. met              The patient will demonstrate MARQUEZ Balance Scale Score >= 51/56 toward improved safety with walking and work tasks.  Progressing towards              The patient will score >= 70 on FOTO to demonstrate significantly improved subjective report of function.  progressing towards    PLAN  [x]  Upgrade activities as tolerated     [x]  Continue plan of care  [x]  Update interventions per flow sheet       []  Discharge due to:_  []  Other:_      Buddy Norman, ASHLEY 10/8/2021

## 2021-10-11 NOTE — PROGRESS NOTES
[facility] 76. Physical Therapy  [address] (MOB IV), [address] [facility], [address] [place] [address] Drive  Phone: [phone] Fax: [phone]    Progress Note    Name: Corinne Moan   : 1965   MD: Referred, Self, MD     Treatment Diagnosis: Pain in left leg [M79.605]  Pain in left arm [M79.602]  Start of Care: 21    Visits from Start of Care: 7  Missed Visits: 3    Summary of Care: Therapy has included therapeutic exercise, neuromuscular re-education, and self-care interventions to improve core/bilateral LE strength/stability, static and dynamic postural control, and functional endurance/independence due to balance and gait deficits/L > R LE weakness/coordination deficits status post ED admission due to suspected CVA 21. Assessment / Recommendations: The patient is a 55year old male who has participated in 7 skilled physical therapy visits due to L arm and leg weakness/coordination deficits with associated balance and gait impairments. He demonstrates increased LE coordination, increased single limb postural control (R = 18 seconds; L = 6 seconds), increased endurance with transfers (10 repetitions during 30 Second Sit To Stand Test), increased static and dynamic postural control in positions of decreased base of support (MARQUEZ Balance Scale: 53/56), aberrant movement patterns with squatting and ambulation, and decreased endurance with functional tasks. The patient scored 46 on FOTO, demonstrating minimal change in subjective report of function over plan of care; however, he reports improvements in function during this visit. The patient has met 2/6 physical therapy goals at this time and demonstrates slow, steady and/or expected progress toward additional goals at this time. Patient continues to report endurance deficits with work tasks at this time.  The patient would benefit from continued skilled physical therapy services at reduced frequency to improve safety/endurance/independence with functional tasks such as walking and work activities. Short Term Goals: To be accomplished in 4-6 treatments:               YEN patient will demonstrate understanding of and compliance with updated and progressive HEP toward improved participation in physical therapy plan of care. - Met               The patient will demonstrate bilateral single limb postural control >= 10 seconds toward improved safety with functional tasks such as walking and work activities. - Progressing              The patient will demonstrate ability to complete >= 11 repetitions during 30 Second Sit to Stand Test toward improved endurance with transfers. - [address] be accomplished in 8-12 treatments:               [facility] patient will demonstrate multiplanar L hip strength increased by >= 1/2 MMT grade toward improved endurance with functional activities such as walking and work tasks. - Met              The patient will demonstrate MARQUEZ Balance Scale Score >= 51/56 toward improved safety with walking and work tasks. - Progressing              The patient will score >= 70 on FOTO to demonstrate significantly improved subjective report of function. - Progressing  Updated Frequency/Duration: Patient to be seen 1 time per week for 3-6 treatments.     Jerry Vernon, PT, DPT 10/11/2021

## 2021-10-15 ENCOUNTER — HOSPITAL ENCOUNTER (OUTPATIENT)
Dept: PHYSICAL THERAPY | Age: 56
Discharge: HOME | End: 2021-10-15
Payer: COMMERCIAL

## 2021-10-15 PROCEDURE — 97112 NEUROMUSCULAR REEDUCATION: CPT

## 2021-10-15 PROCEDURE — 97110 THERAPEUTIC EXERCISES: CPT

## 2021-10-15 NOTE — PROGRESS NOTES
PT DAILY TREATMENT NOTE - [facility] 2-15    Patient Name: Nba Poll  Date:10/15/2021  : 1965  [x]  Patient  Verified  Payor: Vince Hall / Plan: Carlee Light [id number] PPO / Product Type: PPO /    In time: 1010 Out time: 1105  Total Treatment Time (min): 55  Total Timed Codes (min): 55  1:1 Treatment Time ( only): 35  Visit #:  8    Treatment Area: Pain in left leg [M79.605]  Pain in left arm [M79.602]    SUBJECTIVE  Pain Level (0-10 scale): 0  Any medication changes, allergies to medications, adverse drug reactions, diagnosis change, or new procedure performed?: [x] No    [] Yes (see summary sheet for update)  Subjective functional status/changes:   [] No changes reported    The patient reports he continues to have L-sided weakness and balance issues while standing such as when at work; 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 He followed up with ENT a day or two ago who referred him for MRI with contrast, however he does not know date on this yet. OBJECTIVE    43 min Therapeutic Exercise:  [x] See flow sheet :   Rationale: increase ROM, increase strength, improve coordination, improve balance and increase proprioception to improve the patients ability to walk and work without fear of falling    12 min Neuromuscular Re-education:  [x]  See flow sheet :   Rationale: increase ROM, increase strength, improve coordination, improve balance and increase proprioception  to improve the patients ability to walk and work without fear of falling    With   [x] TE   [] TA   [x] Neuro   [] SC   [] other: Patient Education: [x] Review HEP    [] Progressed/Changed HEP based on:   [] positioning   [] body mechanics   [] transfers   [] heat/ice application    [] other:      Other Objective/Functional Measures: None noted     Pain Level (0-10 scale) post treatment: 0/10    ASSESSMENT/Changes in Function:     The patient tolerated therapy visit well at this date. Continued emphasis on progressing closed chain functional strengthening/endurance training toward improved tolerance to work tasks. He demonstrates improved gait mechanics at arrival and throughout visit, however noted intermittent drifting to L with ambulation from which patient was able to self-recover. Patient demonstrates improved march height during march + cable column extensions; noted difficulty coordinating UE/LE movement. Patient requires frequent cues to maintain L UE shoulder flexion during offset suitcase carry + overhead press. Patient fatigues rapidly due to endurance deficits, particularly during second/third set of exercise. Noted several small stumbles from which patient was able to self-recover during L single limb rebounder toss, particularly under fatigued conditions. Patient very fatigued at end of session today. Continue to progress as tolerated. Patient will continue to benefit from skilled PT services to modify and progress therapeutic interventions, address functional mobility deficits, address ROM deficits, address strength deficits, analyze and address soft tissue restrictions, analyze and cue movement patterns, analyze and modify body mechanics/ergonomics and assess and modify postural abnormalities to attain remaining goals. [x]  See Plan of Care  []  See progress note/recertification  []  See Discharge Summary         Progress towards goals / Updated goals:    Short Term Goals: To be accomplished in 4-6 treatments:               [facility] patient will demonstrate understanding of and compliance with updated and progressive HEP toward improved participation in physical therapy plan of care. - Met               The patient will demonstrate bilateral single limb postural control >= 10 seconds toward improved safety with functional tasks such as walking and work activities.  - Progressing              The patient will demonstrate ability to complete >= 11 repetitions during 30 Second Sit to Stand Test toward improved endurance with transfers. - Progressing  Long Term Goals: To be accomplished in 8-12 treatments:               FOX patient will demonstrate multiplanar L hip strength increased by >= 1/2 MMT grade toward improved endurance with functional activities such as walking and work tasks. - Met              The patient will demonstrate MARQUEZ Balance Scale Score >= 51/56 toward improved safety with walking and work tasks. - Progressing              The patient will score >= 70 on FOTO to demonstrate significantly improved subjective report of function. - Progressing  Updated Frequency/Duration: Patient to be seen 1 time per week for 3-6 treatments.     PLAN  [x]  Upgrade activities as tolerated     [x]  Continue plan of care  [x]  Update interventions per flow sheet       []  Discharge due to:_  []  Other:_      Dilan Henderson, PT, DPT 10/15/2021

## 2021-10-22 ENCOUNTER — APPOINTMENT (OUTPATIENT)
Dept: PHYSICAL THERAPY | Age: 56
End: 2021-10-22
Payer: COMMERCIAL

## 2021-10-29 ENCOUNTER — HOSPITAL ENCOUNTER (OUTPATIENT)
Dept: PHYSICAL THERAPY | Age: 56
Discharge: HOME | End: 2021-10-29
Payer: COMMERCIAL

## 2021-10-29 PROCEDURE — 97530 THERAPEUTIC ACTIVITIES: CPT

## 2021-10-29 PROCEDURE — 97112 NEUROMUSCULAR REEDUCATION: CPT

## 2021-10-29 NOTE — PROGRESS NOTES
PT DAILY TREATMENT NOTE - [facility] 2-15    Patient Name: Magda Barcenas  Date:10/29/2021  : 1965  [x]  Patient  Verified  Payor: Aubrey Conway / Plan: Carlee Light [id number] PPO / Product Type: PPO /    In time: 801 Out time: 847  Total Treatment Time (min): 46  Total Timed Codes (min): 46  1:1 Treatment Time ( only): 28  Visit #:  9    Treatment Area: Pain in left leg [M79.605]  Pain in left arm [M79.602]    SUBJECTIVE  Pain Level (0-10 scale): 0  Any medication changes, allergies to medications, adverse drug reactions, diagnosis change, or new procedure performed?: [x] No    [] Yes (see summary sheet for update)  Subjective functional status/changes:   [] No changes reported    The patient reports he has not yet scheduled MRI with contrast; he plans to do this shortly. 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     OBJECTIVE    21 min Therapeutic Exercise:  [x] See flow sheet :   Rationale: increase ROM, increase strength, improve coordination, improve balance and increase proprioception to improve the patients ability to walk and work without fear of falling    15 min Therapeutic Activity:  []  See flow sheet :   Rationale: Monitor vitals  to improve the patients ability to self-manage current condition    10 min Neuromuscular Re-education:  [x]  See flow sheet :   Rationale: increase ROM, increase strength, improve coordination, improve balance and increase proprioception  to improve the patients ability to walk and work without fear of falling    With   [x] TE   [] TA   [x] Neuro   [] SC   [] other: Patient Education: [x] Review HEP    [] Progressed/Changed HEP based on:   [] positioning   [] body mechanics   [] transfers   [] heat/ice application    [] other:      Other Objective/Functional Measures: /111 seated post-exercise; improved to 125/82 after seated break     Pain Level (0-10 scale) post treatment: 0/10    ASSESSMENT/Changes in Function:     The patient tolerated therapy visit moderately well at this date. Continued emphasis on progressing closed chain functional strengthening/endurance training toward improved tolerance to work tasks. He demonstrates improved gait mechanics at arrival and throughout visit, however noted intermittent drifting to L with ambulation from which patient was able to self-recover. Patient fatigues rapidly due to endurance deficits, particularly during second/third set of exercise. Noted several small stumbles from which patient was able to self-recover during L single limb rebounder toss, particularly under fatigued conditions. Patient particularly fatigued post-first bout of sidestepping at cable column; required seated rest break and diastolic blood pressure elevated at that time; returned to normal levels after prolonged seated rest break during which patient was educated on seeking continued imaging and follow up care regarding current condition due to persistent fatigue and endurance deficits. Continue to progress as tolerated. Patient will continue to benefit from skilled PT services to modify and progress therapeutic interventions, address functional mobility deficits, address ROM deficits, address strength deficits, analyze and address soft tissue restrictions, analyze and cue movement patterns, analyze and modify body mechanics/ergonomics and assess and modify postural abnormalities to attain remaining goals.      [x]  See Plan of Care  []  See progress note/recertification  []  See Discharge Summary         Progress towards goals / Updated goals:    Short Term Goals: To be accomplished in 4-6 treatments:               ISZ patient will demonstrate understanding of and compliance with updated and progressive HEP toward improved participation in physical therapy plan of care. - Met               The patient will demonstrate bilateral single limb postural control >= 10 seconds toward improved safety with functional tasks such as walking and work activities. - Progressing              The patient will demonstrate ability to complete >= 11 repetitions during 30 Second Sit to Stand Test toward improved endurance with transfers. - [address] be accomplished in 8-12 treatments:               [facility] patient will demonstrate multiplanar L hip strength increased by >= 1/2 MMT grade toward improved endurance with functional activities such as walking and work tasks. - Met              The patient will demonstrate MARQUEZ Balance Scale Score >= 51/56 toward improved safety with walking and work tasks. - Progressing              The patient will score >= 70 on FOTO to demonstrate significantly improved subjective report of function. - Progressing  Updated Frequency/Duration: Patient to be seen 1 time per week for 3-6 treatments.     PLAN  [x]  Upgrade activities as tolerated     [x]  Continue plan of care  [x]  Update interventions per flow sheet       []  Discharge due to:_  []  Other:_      Jonathanbernard Meléndez PT, DPT 10/29/2021

## 2021-11-05 ENCOUNTER — APPOINTMENT (OUTPATIENT)
Dept: PHYSICAL THERAPY | Age: 56
End: 2021-11-05

## 2021-11-05 NOTE — PROGRESS NOTES
[facility] 76. Physical Therapy  [address] (MOB IV), [address] [place]  Phone: [phone] Fax: [phone]    Discharge Summary  2-15    Patient name: Mindi Cruz  : 1965  Provider#: [id number]  Referral source: Referred, Self, MD      Medical/Treatment Diagnosis: Pain in left leg [M79.605]  Pain in left arm [M79.602]     Prior Hospitalization: see medical history     Comorbidities: See Plan of Care  Prior Level of Function:See Plan of Care  Medications: Verified on Patient Summary List    Start of Care: 21      Onset Date: 21 (ED admission)   Visits from Start of Care: 9     Missed Visits: 5  Reporting Period : 21 to 10/29/21    ASSESSMENT/SUMMARY OF CARE: The patient is discharged today, 21, as he called clinic to cancel final physical therapy appointment and requested discharge from skilled physical therapy services at that time. Final objective data and outcomes were unable to be obtained. See goal assessment below as of most recent progress note performed 10/08/21. Short Term Goals: To be accomplished in 4-6 treatments:               BUQ patient will demonstrate understanding of and compliance with updated and progressive HEP toward improved participation in physical therapy plan of care. - Met               The patient will demonstrate bilateral single limb postural control >= 10 seconds toward improved safety with functional tasks such as walking and work activities. - Progressing              The patient will demonstrate ability to complete >= 11 repetitions during 30 Second Sit to Stand Test toward improved endurance with transfers. - [address] be accomplished in 8-12 treatments:               RZW patient will demonstrate multiplanar L hip strength increased by >= 1/2 MMT grade toward improved endurance with functional activities such as walking and work tasks.  - Met              The patient will demonstrate MARQUEZ Balance Scale Score >= 51/56 toward improved safety with walking and work tasks. - Progressing              The patient will score >= 70 on FOTO to demonstrate significantly improved subjective report of function. - Progressing  Updated Frequency/Duration: Patient to be seen 1 time per week for 3-6 treatments.     RECOMMENDATIONS:  [x]Discontinue therapy: []Patient has reached or is progressing toward set goals      []Patient is non-compliant or has abdicated      []Due to lack of appreciable progress towards set goals      [x]Other: Patient called to cancel final physical therapy appointment and requested discharge from skilled physical therapy services at that time    Jasmine Fink, PT, DPT 11/5/2021

## 2021-12-08 ENCOUNTER — TRANSCRIBE ORDER (OUTPATIENT)
Dept: SCHEDULING | Age: 56
End: 2021-12-08

## 2021-12-08 DIAGNOSIS — M62.81 LEFT-SIDED MUSCLE WEAKNESS: ICD-10-CM

## 2021-12-08 DIAGNOSIS — R53.1 LEFT-SIDED WEAKNESS: ICD-10-CM

## 2021-12-08 DIAGNOSIS — H83.2X3 LABYRINTHINE DYSFUNCTION, BILATERAL: Primary | ICD-10-CM

## 2021-12-21 DIAGNOSIS — E78.5 HYPERLIPIDEMIA WITH TARGET LDL LESS THAN 70: ICD-10-CM

## 2021-12-21 DIAGNOSIS — Z79.4 TYPE 2 DIABETES MELLITUS WITHOUT COMPLICATION, WITH LONG-TERM CURRENT USE OF INSULIN (HCC): ICD-10-CM

## 2021-12-21 DIAGNOSIS — E11.9 TYPE 2 DIABETES MELLITUS WITHOUT COMPLICATION, WITH LONG-TERM CURRENT USE OF INSULIN (HCC): ICD-10-CM

## 2021-12-22 RX ORDER — ATORVASTATIN CALCIUM 40 MG/1
40 TABLET, FILM COATED ORAL DAILY
Qty: 30 TABLET | Refills: 0 | Status: SHIPPED | OUTPATIENT
Start: 2021-12-22 | End: 2022-02-10

## 2021-12-22 RX ORDER — MECLIZINE HYDROCHLORIDE 25 MG/1
25 TABLET ORAL
Qty: 20 TABLET | Refills: 0 | OUTPATIENT
Start: 2021-12-22

## 2021-12-22 RX ORDER — GLIPIZIDE 10 MG/1
10 TABLET ORAL 2 TIMES DAILY
Qty: 60 TABLET | Refills: 0 | Status: SHIPPED | OUTPATIENT
Start: 2021-12-22 | End: 2022-02-14 | Stop reason: SDUPTHER

## 2021-12-22 NOTE — TELEPHONE ENCOUNTER
Requested Prescriptions     Pending Prescriptions Disp Refills    atorvastatin (LIPITOR) 40 mg tablet 30 Tablet 0     Sig: Take 1 Tablet by mouth daily.  meclizine (ANTIVERT) 25 mg tablet 20 Tablet 0     Sig: Take 1 Tablet by mouth three (3) times daily as needed for Dizziness.  glipiZIDE (GLUCOTROL) 10 mg tablet 60 Tablet 0     Sig: Take 1 Tablet by mouth two (2) times a day.         Last Visit 12/20/21  Last Refill Lipitor-10/08/21  Meclizine- 08/10/21  Glipizide- 08/23/21

## 2022-01-03 ENCOUNTER — OFFICE VISIT (OUTPATIENT)
Dept: PRIMARY CARE CLINIC | Age: 57
End: 2022-01-03
Payer: COMMERCIAL

## 2022-01-03 VITALS
HEIGHT: 67 IN | OXYGEN SATURATION: 97 % | TEMPERATURE: 98 F | WEIGHT: 231 LBS | SYSTOLIC BLOOD PRESSURE: 108 MMHG | BODY MASS INDEX: 36.26 KG/M2 | RESPIRATION RATE: 18 BRPM | HEART RATE: 90 BPM | DIASTOLIC BLOOD PRESSURE: 73 MMHG

## 2022-01-03 DIAGNOSIS — Z91.14 NON COMPLIANCE W MEDICATION REGIMEN: ICD-10-CM

## 2022-01-03 DIAGNOSIS — Z23 NEEDS FLU SHOT: ICD-10-CM

## 2022-01-03 DIAGNOSIS — L08.9 INFECTED SEBACEOUS CYST: ICD-10-CM

## 2022-01-03 DIAGNOSIS — E78.5 HYPERLIPIDEMIA WITH TARGET LDL LESS THAN 70: ICD-10-CM

## 2022-01-03 DIAGNOSIS — E11.9 TYPE 2 DIABETES MELLITUS WITHOUT COMPLICATION, WITH LONG-TERM CURRENT USE OF INSULIN (HCC): Primary | ICD-10-CM

## 2022-01-03 DIAGNOSIS — Z79.4 TYPE 2 DIABETES MELLITUS WITHOUT COMPLICATION, WITH LONG-TERM CURRENT USE OF INSULIN (HCC): Primary | ICD-10-CM

## 2022-01-03 DIAGNOSIS — L72.3 INFECTED SEBACEOUS CYST: ICD-10-CM

## 2022-01-03 DIAGNOSIS — R53.1 LEFT-SIDED WEAKNESS: ICD-10-CM

## 2022-01-03 LAB — HBA1C MFR BLD HPLC: 13.1 %

## 2022-01-03 PROCEDURE — 99214 OFFICE O/P EST MOD 30 MIN: CPT | Performed by: FAMILY MEDICINE

## 2022-01-03 PROCEDURE — 90686 IIV4 VACC NO PRSV 0.5 ML IM: CPT | Performed by: FAMILY MEDICINE

## 2022-01-03 PROCEDURE — 90471 IMMUNIZATION ADMIN: CPT | Performed by: FAMILY MEDICINE

## 2022-01-03 PROCEDURE — 83036 HEMOGLOBIN GLYCOSYLATED A1C: CPT | Performed by: FAMILY MEDICINE

## 2022-01-03 RX ORDER — SITAGLIPTIN AND METFORMIN HYDROCHLORIDE 50; 1000 MG/1; MG/1
1 TABLET, FILM COATED ORAL 2 TIMES DAILY WITH MEALS
Qty: 60 TABLET | Refills: 2 | Status: SHIPPED | OUTPATIENT
Start: 2022-01-03 | End: 2022-03-24 | Stop reason: SDUPTHER

## 2022-01-03 RX ORDER — SULFAMETHOXAZOLE AND TRIMETHOPRIM 800; 160 MG/1; MG/1
1 TABLET ORAL 2 TIMES DAILY
Qty: 20 TABLET | Refills: 0 | Status: SHIPPED | OUTPATIENT
Start: 2022-01-03 | End: 2022-01-13

## 2022-01-03 NOTE — PROGRESS NOTES
Subjective:     Chief Complaint   Patient presents with    Diabetes Care Management         He  is a 55y.o. year old male  With  hx of DM-2. HLD , is here  for follow up on diabetes, HLD.  He is here with his wife. Last seen on 12/30/2020. Hx of non compliance with his meds. He stopped taking all his med for a while. Patient reports that he has been taking Glipizide and Lipitor only X 2 weeks. Cannot afford Levemir or any long acting Insulin so I did prescribe Humulin in 2020 but he never took the Insulin he reports. Checks BS rarely. Last A1c was 11.2     Patient was admitted in [facility] in August 2021 with vertigo and left sided week ness. Stroke work up was negative. He reports that he has to drag his left leg when he walks . Left arm feels week. Denies any neck pain but he sometimes has back pain. Denies any urinary symptoms. He has been seeing ENT for vertigo. Has an appointment for MRI on 1/10/2022      He is also here with a complain of painful bump in his upper back which has been oozing. Its been there for months but recently gotten worse. Lab Results   Component Value Date/Time    Cholesterol, total 130 08/10/2021 07:00 AM    HDL Cholesterol 38 08/10/2021 07:00 AM    LDL, calculated 76.2 08/10/2021 07:00 AM    VLDL, calculated 15.8 08/10/2021 07:00 AM    Triglyceride 79 08/10/2021 07:00 AM    CHOL/HDL Ratio 3.4 08/10/2021 07:00 AM         Lab Results   Component Value Date/Time    Hemoglobin A1c 11.2 (H) 08/16/2021 09:36 AM    Hemoglobin A1c (POC) 11.5 12/30/2020 08:33 AM    Hemoglobin A1c, External 9.2 01/14/2015 12:00 AM         Pertinent items are noted in HPI.   Objective:     Vitals:    01/03/22 0841   BP: 108/73   Pulse: 90   Resp: 18   Temp: 98 °F (36.7 °C)   TempSrc: Temporal   SpO2: 97%   Weight: 231 lb (104.8 kg)   Height: 5' 7\" (1.702 m)       Physical Examination: General appearance - alert, well appearing, and in no distress, oriented to person, place, and time and overweight  Mental status - alert, oriented to person, place, and time, normal mood, behavior, speech, dress, motor activity, and thought processes  Chest - clear to auscultation, no wheezes, rales or rhonchi, symmetric air entry  Heart - normal rate, regular rhythm, normal S1, S2, no murmurs, rubs, clicks or gallops  Neurological - normal muscle tone, no tremors, strength 5/5 in all extremity however he is dragging his left leg when he walks. Skin - There is an infected cyst in right upper back with clear discharge noted. No Known Allergies   Social History     Socioeconomic History    Marital status: SINGLE   Tobacco Use    Smoking status: Current Every Day Smoker     Packs/day: 1.00    Smokeless tobacco: Never Used   Vaping Use    Vaping Use: Never used   Substance and Sexual Activity    Alcohol use: Yes     Comment: holidays, several timses a yr    Drug use: Never    Sexual activity: Yes     Partners: Female      Family History   Problem Relation Age of Onset    Diabetes Mother     Alcohol abuse Father     No Known Problems Sister     Diabetes Brother     Stroke Brother     No Known Problems Brother     No Known Problems Brother     No Known Problems Sister       History reviewed. No pertinent surgical history. Past Medical History:   Diagnosis Date    Abscess     Diabetes ([facility] [address]     Hypercholesterolemia     Sleep apnea     Vertigo       Current Outpatient Medications   Medication Sig Dispense Refill    atorvastatin (LIPITOR) 40 mg tablet Take 1 Tablet by mouth daily. 30 Tablet 0    glipiZIDE (GLUCOTROL) 10 mg tablet Take 1 Tablet by mouth two (2) times a day. 60 Tablet 0    Blood-Glucose Meter (ONETOUCH ULTRA2 METER) monitoring kit Check blood sugar in AM ( fasting ) and 2 hours after dinner.  1 Kit 0    Janumet 50-1,000 mg per tablet TAKE 1 TABLET BY MOUTH TWICE DAILY WITH MEALS (Patient not taking: Reported on 1/3/2022) 60 Tablet 0    meclizine (ANTIVERT) 25 mg tablet Take 1 Tablet by mouth three (3) times daily as needed for Dizziness. (Patient not taking: Reported on 1/3/2022) 20 Tablet 0    meclizine (ANTIVERT) 25 mg tablet Take 1 tab TID X 2 days then TID PRN (Patient not taking: Reported on 1/3/2022) 30 Tablet 0    insulin nph-regular human rec (HUMULIN 70-30) 100 unit/mL (70-30) inpn 12 Units by SubCUTAneous route two (2) times daily (with meals). (Patient not taking: Reported on 1/3/2022) 18 Adjustable Dose Pre-filled Pen Syringe 3    Insulin Needles, Disposable, 31 gauge x 5/16\" ndle Use once/day (Patient not taking: Reported on 1/3/2022) 1 Package 11    Insulin Needles, Disposable, (Esther Pen Needle) 32 gauge x 5/32\" ndle 1 Units by SubCUTAneous route daily. (Patient not taking: Reported on 1/3/2022) 3 Pen Needle 6    lancets (ONETOUCH DELICA LANCETS) 30 gauge misc Check blood sugar in AM ( fasting ) and 2 hours after dinner. (Patient not taking: Reported on 1/3/2022) 100 Lancet 11    glucose blood VI test strips (ONETOUCH ULTRA TEST) strip Check blood sugar in AM ( fasting ) and 2 hours after dinner. (Patient not taking: Reported on 1/3/2022) 100 Strip 11        Assessment/ Plan:   Diagnoses and all orders for this visit:    1. Type 2 diabetes mellitus without complication, with long-term current use of insulin (HCC)  -     AMB POC HEMOGLOBIN A1C     Patient has hx of non compliant. Discussed how important to take his med regularly. Discussed side effects of uncontrolled DM including stroke, blindness, kidney failure. Last Point of Care HGB A1C  Hemoglobin A1c (POC)   Date Value Ref Range Status   01/03/2022 13.1 % Final        -    Start  insulin nph-regular human rec (HUMULIN 70-30) 100 unit/mL (70-30) inpn; 12 Units by SubCUTAneous route two (2) times daily (with meals). -     METABOLIC PANEL, COMPREHENSIVE; Future  -     MICROALBUMIN, UR, RAND W/ MICROALB/CREAT RATIO; Future  -     REFERRAL TO ENDOCRINOLOGY  -     SITagliptin-metFORMIN (Janumet) 50-1,000 mg per tablet;  Take 1 Tablet by mouth two (2) times daily (with meals). Continue Glipizide. Strongly advised to check BS three times/day and bring long book. 2. Non compliance w medication regimen  -   Refer to #1    insulin nph-regular human rec (HUMULIN 70-30) 100 unit/mL (70-30) inpn; 12 Units by SubCUTAneous route two (2) times daily (with meals). 3. Hyperlipidemia with target LDL less than 70  -     LIPID PANEL; Future  -     METABOLIC PANEL, COMPREHENSIVE; Future    4. Left-sided weakness  -     REFERRAL TO NEUROLOGY              He may need MRI back . MRI brain is pending. 5. Infected sebaceous cyst  -    Start  trimethoprim-sulfamethoxazole (BACTRIM DS, SEPTRA DS) 160-800 mg per tablet; Take 1 Tablet by mouth two (2) times a day for 10 days. 6. Needs flu shot  -     INFLUENZA VIRUS VAC QUAD,SPLIT,PRESV FREE SYRINGE IM           Medication risks/benefits/costs/interactions/alternatives discussed with patient. Advised patient to call back or return to office if symptoms worsen/change/persist. If patient cannot reach us or should anything more severe/urgent arise he/she should proceed directly to the nearest emergency department. Discussed expected course/resolution/complications of diagnosis in detail with patient. Patient given a written after visit summary which includes her diagnoses, current medications and vitals. Patient expressed understanding with the diagnosis and plan. Follow-up and Dispositions    · Return in about 6 weeks (around 2/14/2022), or if symptoms worsen or fail to improve, for Bring diabetic log book. Jamal Diaz

## 2022-01-03 NOTE — PROGRESS NOTES
Identified pt with two pt identifiers(name and ). Chief Complaint   Patient presents with    Diabetes Care Management         3 most recent PHQ Screens 1/3/2022   Little interest or pleasure in doing things Not at all   Feeling down, depressed, irritable, or hopeless More than half the days   Total Score PHQ 2 2        Vitals:    22 0841   BP: 108/73   Pulse: 90   Resp: 18   Temp: 98 °F (36.7 °C)   TempSrc: Temporal   SpO2: 97%   Weight: 231 lb (104.8 kg)   Height: 5' 7\" (1.702 m)       Health Maintenance Due   Topic    Hepatitis C Screening     COVID-19 Vaccine (1)    Pneumococcal 0-64 years (1 of 2 - PPSV23)    Eye Exam Retinal or Dilated     DTaP/Tdap/Td series (1 - Tdap)    Colorectal Cancer Screening Combo     Shingrix Vaccine Age 50> (1 of 2)    Flu Vaccine (1)    Foot Exam Q1     A1C test (Diabetic or Prediabetic)     MICROALBUMIN Q1        1. Have you been to the ER, urgent care clinic since your last visit? Hospitalized since your last visit? ED - [facility] - [place]     2. Have you seen or consulted any other health care providers outside of the [address] [place] [address] since your last visit? Include any pap smears or colon screening.  No

## 2022-01-10 ENCOUNTER — TELEPHONE (OUTPATIENT)
Dept: PRIMARY CARE CLINIC | Age: 57
End: 2022-01-10

## 2022-01-10 ENCOUNTER — HOSPITAL ENCOUNTER (OUTPATIENT)
Dept: MRI IMAGING | Age: 57
Discharge: HOME | End: 2022-01-10
Attending: SPECIALIST
Payer: COMMERCIAL

## 2022-01-10 DIAGNOSIS — H83.2X3 LABYRINTHINE DYSFUNCTION, BILATERAL: ICD-10-CM

## 2022-01-10 DIAGNOSIS — M62.81 LEFT-SIDED MUSCLE WEAKNESS: ICD-10-CM

## 2022-01-10 DIAGNOSIS — R53.1 LEFT-SIDED WEAKNESS: ICD-10-CM

## 2022-01-10 PROCEDURE — 70553 MRI BRAIN STEM W/O & W/DYE: CPT

## 2022-01-10 PROCEDURE — A9576 INJ PROHANCE MULTIPACK: HCPCS | Performed by: SPECIALIST

## 2022-01-10 PROCEDURE — 74011250636 HC RX REV CODE- 250/636: Performed by: SPECIALIST

## 2022-01-10 RX ADMIN — GADOTERIDOL 20 ML: 279.3 INJECTION, SOLUTION INTRAVENOUS at 11:02

## 2022-01-10 NOTE — TELEPHONE ENCOUNTER
----- Message from Jere Hamilton MD sent at 1/7/2022  2:04 PM EST -----  Please mail letter:    Urine is normal. No protein. Great! Kidney and liver function is fine. Cholesterol level went up from last time as expected since you were not taking the med regularly. Please take the atorvastatin regularly and follow up.

## 2022-02-08 ENCOUNTER — HOSPITAL ENCOUNTER (INPATIENT)
Age: 57
LOS: 2 days | Discharge: HOME | DRG: 065 | End: 2022-02-10
Attending: EMERGENCY MEDICINE | Admitting: INTERNAL MEDICINE
Payer: COMMERCIAL

## 2022-02-08 ENCOUNTER — APPOINTMENT (OUTPATIENT)
Dept: CT IMAGING | Age: 57
DRG: 065 | End: 2022-02-08
Attending: EMERGENCY MEDICINE
Payer: COMMERCIAL

## 2022-02-08 ENCOUNTER — APPOINTMENT (OUTPATIENT)
Dept: MRI IMAGING | Age: 57
DRG: 065 | End: 2022-02-08
Attending: INTERNAL MEDICINE
Payer: COMMERCIAL

## 2022-02-08 DIAGNOSIS — E11.9 TYPE 2 DIABETES MELLITUS WITHOUT COMPLICATION, WITH LONG-TERM CURRENT USE OF INSULIN (HCC): ICD-10-CM

## 2022-02-08 DIAGNOSIS — Z79.4 TYPE 2 DIABETES MELLITUS WITHOUT COMPLICATION, WITH LONG-TERM CURRENT USE OF INSULIN (HCC): ICD-10-CM

## 2022-02-08 DIAGNOSIS — R53.1 ACUTE LEFT-SIDED WEAKNESS: ICD-10-CM

## 2022-02-08 DIAGNOSIS — I63.9 CEREBROVASCULAR ACCIDENT (CVA), UNSPECIFIED MECHANISM (HCC): Primary | ICD-10-CM

## 2022-02-08 DIAGNOSIS — Z91.14 NON COMPLIANCE W MEDICATION REGIMEN: ICD-10-CM

## 2022-02-08 LAB
ALBUMIN SERPL-MCNC: 3.7 G/DL (ref 3.5–5)
ALBUMIN/GLOB SERPL: 1 {RATIO} (ref 1.1–2.2)
ALP SERPL-CCNC: 80 U/L (ref 45–117)
ALT SERPL-CCNC: 20 U/L (ref 12–78)
ANION GAP SERPL CALC-SCNC: 3 MMOL/L (ref 5–15)
AST SERPL-CCNC: 10 U/L (ref 15–37)
BASOPHILS # BLD: 0.1 K/UL (ref 0–0.1)
BASOPHILS NFR BLD: 1 % (ref 0–1)
BILIRUB SERPL-MCNC: 0.3 MG/DL (ref 0.2–1)
BUN SERPL-MCNC: 10 MG/DL (ref 6–20)
BUN/CREAT SERPL: 11 (ref 12–20)
CALCIUM SERPL-MCNC: 9.1 MG/DL (ref 8.5–10.1)
CHLORIDE SERPL-SCNC: 104 MMOL/L (ref 97–108)
CO2 SERPL-SCNC: 31 MMOL/L (ref 21–32)
CREAT SERPL-MCNC: 0.89 MG/DL (ref 0.7–1.3)
DIFFERENTIAL METHOD BLD: NORMAL
EOSINOPHIL # BLD: 0.2 K/UL (ref 0–0.4)
EOSINOPHIL NFR BLD: 2 % (ref 0–7)
ERYTHROCYTE [DISTWIDTH] IN BLOOD BY AUTOMATED COUNT: 12.5 % (ref 11.5–14.5)
GLOBULIN SER CALC-MCNC: 3.8 G/DL (ref 2–4)
GLUCOSE BLD STRIP.AUTO-MCNC: 144 MG/DL (ref 65–117)
GLUCOSE SERPL-MCNC: 209 MG/DL (ref 65–100)
HCT VFR BLD AUTO: 42.4 % (ref 36.6–50.3)
HGB BLD-MCNC: 14 G/DL (ref 12.1–17)
IMM GRANULOCYTES # BLD AUTO: 0 K/UL (ref 0–0.04)
IMM GRANULOCYTES NFR BLD AUTO: 0 % (ref 0–0.5)
INR PPP: 1 (ref 0.9–1.1)
LYMPHOCYTES # BLD: 3.2 K/UL (ref 0.8–3.5)
LYMPHOCYTES NFR BLD: 41 % (ref 12–49)
MCH RBC QN AUTO: 30.7 PG (ref 26–34)
MCHC RBC AUTO-ENTMCNC: 33 G/DL (ref 30–36.5)
MCV RBC AUTO: 93 FL (ref 80–99)
MONOCYTES # BLD: 0.6 K/UL (ref 0–1)
MONOCYTES NFR BLD: 8 % (ref 5–13)
NEUTS SEG # BLD: 3.6 K/UL (ref 1.8–8)
NEUTS SEG NFR BLD: 48 % (ref 32–75)
NRBC # BLD: 0 K/UL (ref 0–0.01)
NRBC BLD-RTO: 0 PER 100 WBC
PLATELET # BLD AUTO: 162 K/UL (ref 150–400)
PMV BLD AUTO: 11.1 FL (ref 8.9–12.9)
POTASSIUM SERPL-SCNC: 4.2 MMOL/L (ref 3.5–5.1)
PROT SERPL-MCNC: 7.5 G/DL (ref 6.4–8.2)
PROTHROMBIN TIME: 10.4 SEC (ref 9–11.1)
RBC # BLD AUTO: 4.56 M/UL (ref 4.1–5.7)
SERVICE CMNT-IMP: ABNORMAL
SODIUM SERPL-SCNC: 138 MMOL/L (ref 136–145)
WBC # BLD AUTO: 7.6 K/UL (ref 4.1–11.1)

## 2022-02-08 PROCEDURE — 65270000029 HC RM PRIVATE

## 2022-02-08 PROCEDURE — 85610 PROTHROMBIN TIME: CPT

## 2022-02-08 PROCEDURE — 70496 CT ANGIOGRAPHY HEAD: CPT

## 2022-02-08 PROCEDURE — 70450 CT HEAD/BRAIN W/O DYE: CPT

## 2022-02-08 PROCEDURE — 74011000636 HC RX REV CODE- 636: Performed by: EMERGENCY MEDICINE

## 2022-02-08 PROCEDURE — 82962 GLUCOSE BLOOD TEST: CPT

## 2022-02-08 PROCEDURE — 94762 N-INVAS EAR/PLS OXIMTRY CONT: CPT

## 2022-02-08 PROCEDURE — 93005 ELECTROCARDIOGRAM TRACING: CPT

## 2022-02-08 PROCEDURE — 99284 EMERGENCY DEPT VISIT MOD MDM: CPT

## 2022-02-08 PROCEDURE — 70551 MRI BRAIN STEM W/O DYE: CPT

## 2022-02-08 PROCEDURE — 36415 COLL VENOUS BLD VENIPUNCTURE: CPT

## 2022-02-08 PROCEDURE — 4A03X5D MEASUREMENT OF ARTERIAL FLOW, INTRACRANIAL, EXTERNAL APPROACH: ICD-10-PCS | Performed by: RADIOLOGY

## 2022-02-08 PROCEDURE — 85025 COMPLETE CBC W/AUTO DIFF WBC: CPT

## 2022-02-08 PROCEDURE — 74011636637 HC RX REV CODE- 636/637: Performed by: INTERNAL MEDICINE

## 2022-02-08 PROCEDURE — 80053 COMPREHEN METABOLIC PANEL: CPT

## 2022-02-08 RX ORDER — INSULIN GLARGINE 100 [IU]/ML
16 INJECTION, SOLUTION SUBCUTANEOUS
Status: DISCONTINUED | OUTPATIENT
Start: 2022-02-08 | End: 2022-02-10 | Stop reason: HOSPADM

## 2022-02-08 RX ORDER — ONDANSETRON 2 MG/ML
4 INJECTION INTRAMUSCULAR; INTRAVENOUS
Status: DISCONTINUED | OUTPATIENT
Start: 2022-02-08 | End: 2022-02-10 | Stop reason: HOSPADM

## 2022-02-08 RX ORDER — DEXTROSE MONOHYDRATE 100 MG/ML
125-250 INJECTION, SOLUTION INTRAVENOUS AS NEEDED
Status: DISCONTINUED | OUTPATIENT
Start: 2022-02-08 | End: 2022-02-10 | Stop reason: HOSPADM

## 2022-02-08 RX ORDER — GUAIFENESIN 100 MG/5ML
81 LIQUID (ML) ORAL DAILY
Status: DISCONTINUED | OUTPATIENT
Start: 2022-02-09 | End: 2022-02-10 | Stop reason: HOSPADM

## 2022-02-08 RX ORDER — MAGNESIUM SULFATE 100 %
4 CRYSTALS MISCELLANEOUS AS NEEDED
Status: DISCONTINUED | OUTPATIENT
Start: 2022-02-08 | End: 2022-02-10 | Stop reason: HOSPADM

## 2022-02-08 RX ORDER — ENOXAPARIN SODIUM 100 MG/ML
40 INJECTION SUBCUTANEOUS EVERY 24 HOURS
Status: DISCONTINUED | OUTPATIENT
Start: 2022-02-09 | End: 2022-02-10 | Stop reason: HOSPADM

## 2022-02-08 RX ORDER — ACETAMINOPHEN 650 MG/1
650 SUPPOSITORY RECTAL
Status: DISCONTINUED | OUTPATIENT
Start: 2022-02-08 | End: 2022-02-10 | Stop reason: HOSPADM

## 2022-02-08 RX ORDER — ACETAMINOPHEN 325 MG/1
650 TABLET ORAL
Status: DISCONTINUED | OUTPATIENT
Start: 2022-02-08 | End: 2022-02-10 | Stop reason: HOSPADM

## 2022-02-08 RX ORDER — LABETALOL HYDROCHLORIDE 5 MG/ML
5 INJECTION, SOLUTION INTRAVENOUS
Status: DISCONTINUED | OUTPATIENT
Start: 2022-02-08 | End: 2022-02-10 | Stop reason: HOSPADM

## 2022-02-08 RX ORDER — INSULIN LISPRO 100 [IU]/ML
INJECTION, SOLUTION INTRAVENOUS; SUBCUTANEOUS
Status: DISCONTINUED | OUTPATIENT
Start: 2022-02-08 | End: 2022-02-10 | Stop reason: HOSPADM

## 2022-02-08 RX ADMIN — IOPAMIDOL 100 ML: 755 INJECTION, SOLUTION INTRAVENOUS at 18:24

## 2022-02-08 RX ADMIN — INSULIN GLARGINE 16 UNITS: 100 INJECTION, SOLUTION SUBCUTANEOUS at 22:20

## 2022-02-08 NOTE — ED PROVIDER NOTES
EMERGENCY DEPARTMENT HISTORY AND PHYSICAL EXAM      Date: 2/8/2022  Patient Name: Chuy Nolen    History of Presenting Illness     Chief Complaint   Patient presents with    Extremity Weakness     Left arm weakness this morning at 0530 when pt awoke. Arm remained weak, then pt had a fall at 1330. last known well 2000       History Provided By: Patient    HPI: Chuy Nolen, 55 y.o. male presents to the ED with cc of left arm and leg weakness. 43-year-old male with a history of diabetes, vertigo and previous stroke in August presents emergency department with left arm and leg weakness. Patient reports symptoms have been ongoing since August but patient has been able to ambulate with just some mild weakness in his left upper extremity. Patient reports he went to bed in his baseline state at approximately 9 PM, this morning upon awakening he noted his left arm was weak. He denies any change in sensation. Patient went to work and had difficulty working due to arm weakness. He reports he also had difficulty ambulating and had a fall due to left leg weakness. Patient denies headache, not on any blood thinners. Denies chest pain or shortness of breath. He denies abdominal pain, nausea, or diarrhea. No trouble speech or vision. There are no other complaints, changes, or physical findings at this time. PCP: Haley Wallis MD    No current facility-administered medications on file prior to encounter. Current Outpatient Medications on File Prior to Encounter   Medication Sig Dispense Refill    insulin nph-regular human rec (HUMULIN 70-30) 100 unit/mL (70-30) inpn 12 Units by SubCUTAneous route two (2) times daily (with meals). 18 Adjustable Dose Pre-filled Pen Syringe 1    SITagliptin-metFORMIN (Janumet) 50-1,000 mg per tablet Take 1 Tablet by mouth two (2) times daily (with meals). 60 Tablet 2    atorvastatin (LIPITOR) 40 mg tablet Take 1 Tablet by mouth daily.  30 Tablet 0    glipiZIDE (GLUCOTROL) 10 mg tablet Take 1 Tablet by mouth two (2) times a day. 60 Tablet 0    meclizine (ANTIVERT) 25 mg tablet Take 1 Tablet by mouth three (3) times daily as needed for Dizziness. (Patient not taking: Reported on 1/3/2022) 20 Tablet 0    meclizine (ANTIVERT) 25 mg tablet Take 1 tab TID X 2 days then TID PRN (Patient not taking: Reported on 1/3/2022) 30 Tablet 0    Insulin Needles, Disposable, 31 gauge x 5/16\" ndle Use once/day (Patient not taking: Reported on 1/3/2022) 1 Package 11    Insulin Needles, Disposable, (Esther Pen Needle) 32 gauge x 5/32\" ndle 1 Units by SubCUTAneous route daily. (Patient not taking: Reported on 1/3/2022) 3 Pen Needle 6    lancets (ONETOUCH DELICA LANCETS) 30 gauge misc Check blood sugar in AM ( fasting ) and 2 hours after dinner. (Patient not taking: Reported on 1/3/2022) 100 Lancet 11    Blood-Glucose Meter (ONETOUCH ULTRA2 METER) monitoring kit Check blood sugar in AM ( fasting ) and 2 hours after dinner. 1 Kit 0    glucose blood VI test strips (ONETOUCH ULTRA TEST) strip Check blood sugar in AM ( fasting ) and 2 hours after dinner. (Patient not taking: Reported on 1/3/2022) 100 Strip 11       Past History     Past Medical History:  Past Medical History:   Diagnosis Date    Abscess     Diabetes ([address]     Hypercholesterolemia     Sleep apnea     Vertigo        Past Surgical History:  No past surgical history on file.     Family History:  Family History   Problem Relation Age of Onset    Diabetes Mother     Alcohol abuse Father     No Known Problems Sister     Diabetes Brother     Stroke Brother     No Known Problems Brother     No Known Problems Brother     No Known Problems Sister        Social History:  Social History     Tobacco Use    Smoking status: Current Every Day Smoker     Packs/day: 1.00    Smokeless tobacco: Never Used   Vaping Use    Vaping Use: Never used   Substance Use Topics    Alcohol use: Yes     Comment: holidays, several timses a yr    Drug use: Never       Allergies:  No Known Allergies      Review of Systems   Review of Systems   Constitutional: Negative for chills and fever. HENT: Negative for voice change. Eyes: Negative for pain and redness. Respiratory: Negative for cough and chest tightness. Cardiovascular: Negative for chest pain and leg swelling. Gastrointestinal: Negative for abdominal pain, diarrhea, nausea and vomiting. Genitourinary: Negative for hematuria. Musculoskeletal: Negative for gait problem. Skin: Negative for color change, pallor and rash. Neurological: Positive for weakness and headaches. Negative for facial asymmetry, light-headedness and numbness. Hematological: Does not bruise/bleed easily. Psychiatric/Behavioral: Negative for behavioral problems. All other systems reviewed and are negative. Physical Exam   Physical Exam  Vitals and nursing note reviewed. Constitutional:       Comments: 60-year-old male, sitting in chair, no acute distress   HENT:      Head: Normocephalic and atraumatic. Nose: Nose normal.      Mouth/Throat:      Mouth: Mucous membranes are moist.   Eyes:      Pupils: Pupils are equal, round, and reactive to light. Cardiovascular:      Rate and Rhythm: Normal rate and regular rhythm. Pulses: Normal pulses. Heart sounds: No murmur heard. No friction rub. No gallop. Pulmonary:      Effort: Pulmonary effort is normal.      Breath sounds: Normal breath sounds. No wheezing, rhonchi or rales. Abdominal:      General: Abdomen is flat. There is no distension. Palpations: Abdomen is soft. Tenderness: There is no abdominal tenderness. Musculoskeletal:         General: No swelling. Normal range of motion. Cervical back: Normal range of motion. Right lower leg: No edema. Left lower leg: No edema. Skin:     General: Skin is warm and dry. Capillary Refill: Capillary refill takes less than 2 seconds.    Neurological:      Mental Status: He is alert. Comments: Awake and oriented, extraocular movements intact, visual fields intact confrontation. Answers questions appropriately and follows commands. Cranial nerves II through XII intact. There is mild drift of the left upper and lower extremity with difficulty with finger-nose likely secondary to weakness. Equal sensation bilaterally. Gait not tested formally. Psychiatric:         Mood and Affect: Mood normal.         Diagnostic Study Results     Labs -     Recent Results (from the past 12 hour(s))   CBC WITH AUTOMATED DIFF    Collection Time: 02/08/22  4:41 PM   Result Value Ref Range    WBC 7.6 4.1 - 11.1 K/uL    RBC 4.56 4.10 - 5.70 M/uL    HGB 14.0 12.1 - 17.0 g/dL    HCT 42.4 36.6 - 50.3 %    MCV 93.0 80.0 - 99.0 FL    MCH 30.7 26.0 - 34.0 PG    MCHC 33.0 30.0 - 36.5 g/dL    RDW 12.5 11.5 - 14.5 %    PLATELET 329 013 - 147 K/uL    MPV 11.1 8.9 - 12.9 FL    NRBC 0.0 0  WBC    ABSOLUTE NRBC 0.00 0.00 - 0.01 K/uL    NEUTROPHILS 48 32 - 75 %    LYMPHOCYTES 41 12 - 49 %    MONOCYTES 8 5 - 13 %    EOSINOPHILS 2 0 - 7 %    BASOPHILS 1 0 - 1 %    IMMATURE GRANULOCYTES 0 0.0 - 0.5 %    ABS. NEUTROPHILS 3.6 1.8 - 8.0 K/UL    ABS. LYMPHOCYTES 3.2 0.8 - 3.5 K/UL    ABS. MONOCYTES 0.6 0.0 - 1.0 K/UL    ABS. EOSINOPHILS 0.2 0.0 - 0.4 K/UL    ABS. BASOPHILS 0.1 0.0 - 0.1 K/UL    ABS. IMM.  GRANS. 0.0 0.00 - 0.04 K/UL    DF AUTOMATED     METABOLIC PANEL, COMPREHENSIVE    Collection Time: 02/08/22  4:41 PM   Result Value Ref Range    Sodium 138 136 - 145 mmol/L    Potassium 4.2 3.5 - 5.1 mmol/L    Chloride 104 97 - 108 mmol/L    CO2 31 21 - 32 mmol/L    Anion gap 3 (L) 5 - 15 mmol/L    Glucose 209 (H) 65 - 100 mg/dL    BUN 10 6 - 20 MG/DL    Creatinine 0.89 0.70 - 1.30 MG/DL    BUN/Creatinine ratio 11 (L) 12 - 20      GFR est AA >60 >60 ml/min/1.73m2    GFR est non-AA >60 >60 ml/min/1.73m2    Calcium 9.1 8.5 - 10.1 MG/DL    Bilirubin, total 0.3 0.2 - 1.0 MG/DL    ALT (SGPT) 20 12 - 78 U/L    AST (SGOT) 10 (L) 15 - 37 U/L    Alk. phosphatase 80 45 - 117 U/L    Protein, total 7.5 6.4 - 8.2 g/dL    Albumin 3.7 3.5 - 5.0 g/dL    Globulin 3.8 2.0 - 4.0 g/dL    A-G Ratio 1.0 (L) 1.1 - 2.2     PROTHROMBIN TIME + INR    Collection Time: 02/08/22  4:41 PM   Result Value Ref Range    INR 1.0 0.9 - 1.1      Prothrombin time 10.4 9.0 - 11.1 sec   EKG, 12 LEAD, INITIAL    Collection Time: 02/08/22  7:03 PM   Result Value Ref Range    Ventricular Rate 87 BPM    Atrial Rate 87 BPM    P-R Interval 204 ms    QRS Duration 72 ms    Q-T Interval 360 ms    QTC Calculation (Bezet) 433 ms    Calculated P Axis 59 degrees    Calculated R Axis 5 degrees    Calculated T Axis -25 degrees    Diagnosis       Sinus rhythm with occasional premature ventricular complexes  Low voltage QRS  Nonspecific T wave abnormality  When compared with ECG of 18-AUG-2021 12:11,  premature ventricular complexes are now present  Nonspecific T wave abnormality, worse in Anterolateral leads         Radiologic Studies -   CT HEAD WO CONT   Final Result   No acute intracranial process identified      CTA HEAD NECK W CONT           CT Results  (Last 48 hours)               02/08/22 1824  CT HEAD WO CONT Final result    Impression:  No acute intracranial process identified       Narrative:  EXAM: CT HEAD WO CONT       INDICATION: L arm and leg weakness       COMPARISON: 1/10/2022. CONTRAST: None. TECHNIQUE: Unenhanced CT of the head was performed using 5 mm images. Brain and   bone windows were generated. Coronal and sagittal reformats. CT dose reduction   was achieved through use of a standardized protocol tailored for this   examination and automatic exposure control for dose modulation. FINDINGS:   The ventricles and sulci are normal in size, shape and configuration. . There is   no significant white matter disease. There is no intracranial hemorrhage,   extra-axial collection, or mass effect. The basilar cisterns are open.  No CT evidence of acute infarct. The bone windows demonstrate no abnormalities. The visualized portions of the   paranasal sinuses and mastoid air cells are clear. 02/08/22 1824  CTA HEAD NECK W CONT Preliminary result    Narrative:  **PRELIMINARY REPORT**       No large vessel occlusion        Preliminary report was provided by Dr. Jovanni Finney, the on-call radiologist, at [address]   hours       Final report to follow. **END PRELIMINARY REPORT**                                   CXR Results  (Last 48 hours)    None          Medical Decision Making   I am the first provider for this patient. I reviewed the vital signs, available nursing notes, past medical history, past surgical history, family history and social history. Vital Signs-Reviewed the patient's vital signs. Patient Vitals for the past 12 hrs:   Temp Pulse Resp BP SpO2   02/08/22 1547 98.5 °F (36.9 °C) 68 16 (!) 181/147 98 %     Records Reviewed: Nursing Notes and Old Medical Records    Provider Notes (Medical Decision Making):     72-year-old male presents emergency department with left upper and lower extremity weakness. Vital signs are stable with exception of hypertension. My concern is for acute stroke, consider ischemic or hemorrhagic causes. Not a candidate for TPA given last known well, given isolated motor findings, low suspicion for large vessel occlusion. Will CT head and CTA head and neck. Doubt seizure, lower suspicion for recrudescence of previous stroke. Check basic labs, reassess. ED Course:   Initial assessment performed. The patients presenting problems have been discussed, and they are in agreement with the care plan formulated and outlined with them. I have encouraged them to ask questions as they arise throughout their visit. ED Course as of 02/08/22 1920 Tue Feb 08, 2022   6119 Labs unremarkable. CTH and CTA negative for hemorrhage or LVO. D/w Dr. Jerri Habermann for admission.  [MB]      ED Course User Index  [MB] MD Sarah Irwin MD      Disposition:    Admitted      Diagnosis     Clinical Impression:   1. Cerebrovascular accident (CVA), unspecified mechanism ([address]    2. Acute left-sided weakness        Attestations:    Sarah Negrete MD    Please note that this dictation was completed with LYCE[employer], the computer voice recognition software. Quite often unanticipated grammatical, syntax, homophones, and other interpretive errors are inadvertently transcribed by the computer software. Please disregard these errors. Please excuse any errors that have escaped final proofreading. Thank you.

## 2022-02-09 ENCOUNTER — APPOINTMENT (OUTPATIENT)
Dept: NON INVASIVE DIAGNOSTICS | Age: 57
DRG: 065 | End: 2022-02-09
Attending: INTERNAL MEDICINE
Payer: COMMERCIAL

## 2022-02-09 ENCOUNTER — APPOINTMENT (OUTPATIENT)
Dept: GENERAL RADIOLOGY | Age: 57
DRG: 065 | End: 2022-02-09
Attending: INTERNAL MEDICINE
Payer: COMMERCIAL

## 2022-02-09 LAB
ALBUMIN SERPL-MCNC: 3.2 G/DL (ref 3.5–5)
ALBUMIN/GLOB SERPL: 1 {RATIO} (ref 1.1–2.2)
ALP SERPL-CCNC: 67 U/L (ref 45–117)
ALT SERPL-CCNC: 15 U/L (ref 12–78)
ANION GAP SERPL CALC-SCNC: 4 MMOL/L (ref 5–15)
AST SERPL-CCNC: 6 U/L (ref 15–37)
ATRIAL RATE: 87 BPM
BASOPHILS # BLD: 0.1 K/UL (ref 0–0.1)
BASOPHILS NFR BLD: 1 % (ref 0–1)
BILIRUB SERPL-MCNC: 0.3 MG/DL (ref 0.2–1)
BUN SERPL-MCNC: 9 MG/DL (ref 6–20)
BUN/CREAT SERPL: 13 (ref 12–20)
CALCIUM SERPL-MCNC: 8.6 MG/DL (ref 8.5–10.1)
CALCULATED P AXIS, ECG09: 59 DEGREES
CALCULATED R AXIS, ECG10: 5 DEGREES
CALCULATED T AXIS, ECG11: -25 DEGREES
CHLORIDE SERPL-SCNC: 106 MMOL/L (ref 97–108)
CHOLEST SERPL-MCNC: 175 MG/DL
CO2 SERPL-SCNC: 28 MMOL/L (ref 21–32)
CREAT SERPL-MCNC: 0.67 MG/DL (ref 0.7–1.3)
DIAGNOSIS, 93000: NORMAL
DIFFERENTIAL METHOD BLD: ABNORMAL
ECHO AO ROOT DIAM: 3.6 CM
ECHO AO ROOT INDEX: 1.64 CM/M2
ECHO AV AREA PEAK VELOCITY: 4.4 CM2
ECHO AV AREA PEAK VELOCITY: 4.5 CM2
ECHO AV CUSP MM: 2.3 CM
ECHO AV MEAN GRADIENT: 3 MMHG
ECHO AV MEAN VELOCITY: 0.8 M/S
ECHO AV PEAK GRADIENT: 5 MMHG
ECHO AV PEAK VELOCITY: 1.1 M/S
ECHO AV VELOCITY RATIO: 0.91
ECHO AV VTI: 21.2 CM
ECHO LA DIAMETER INDEX: 1.59 CM/M2
ECHO LA DIAMETER: 3.5 CM
ECHO LA TO AORTIC ROOT RATIO: 0.97
ECHO LA VOL 4C: 45 ML (ref 18–58)
ECHO LA VOLUME INDEX A4C: 20 ML/M2 (ref 16–34)
ECHO LV E' LATERAL VELOCITY: 7 CM/S
ECHO LV E' SEPTAL VELOCITY: 6 CM/S
ECHO LV EDV A4C: 117 ML
ECHO LV EDV INDEX A4C: 53 ML/M2
ECHO LV EJECTION FRACTION A4C: 46 %
ECHO LV ESV A4C: 63 ML
ECHO LV ESV INDEX A4C: 29 ML/M2
ECHO LV FRACTIONAL SHORTENING: 36 % (ref 28–44)
ECHO LV INTERNAL DIMENSION DIASTOLE INDEX: 2.41 CM/M2
ECHO LV INTERNAL DIMENSION DIASTOLIC: 5.3 CM (ref 4.2–5.9)
ECHO LV INTERNAL DIMENSION SYSTOLIC INDEX: 1.55 CM/M2
ECHO LV INTERNAL DIMENSION SYSTOLIC: 3.4 CM
ECHO LV IVSD: 0.8 CM (ref 0.6–1)
ECHO LV MASS 2D: 174.5 G (ref 88–224)
ECHO LV MASS INDEX 2D: 79.3 G/M2 (ref 49–115)
ECHO LV POSTERIOR WALL DIASTOLIC: 1 CM (ref 0.6–1)
ECHO LV RELATIVE WALL THICKNESS RATIO: 0.38
ECHO LVOT AREA: 4.9 CM2
ECHO LVOT DIAM: 2.5 CM
ECHO LVOT PEAK GRADIENT: 4 MMHG
ECHO LVOT PEAK VELOCITY: 1 M/S
ECHO MV A VELOCITY: 0.99 M/S
ECHO MV E DECELERATION TIME (DT): 111.1 MS
ECHO MV E VELOCITY: 0.99 M/S
ECHO MV E/A RATIO: 1
ECHO MV E/E' LATERAL: 14.14
ECHO MV E/E' RATIO (AVERAGED): 15.32
ECHO MV E/E' SEPTAL: 16.5
ECHO MV MAX VELOCITY: 1 M/S
ECHO MV MEAN GRADIENT: 2 MMHG
ECHO MV MEAN VELOCITY: 0.7 M/S
ECHO MV PEAK GRADIENT: 4 MMHG
ECHO MV REGURGITANT ALIASING (NYQUIST) VELOCITY: 28 CM/S
ECHO MV REGURGITANT PEAK GRADIENT: 149 MMHG
ECHO MV REGURGITANT PEAK VELOCITY: 6.1 M/S
ECHO MV REGURGITANT VELOCITY PISA: 5.4 M/S
ECHO MV REGURGITANT VTIA: 187.9 CM
ECHO MV VTI: 24.2 CM
ECHO PV MAX VELOCITY: 1.3 M/S
ECHO PV PEAK GRADIENT: 6 MMHG
EOSINOPHIL # BLD: 0.2 K/UL (ref 0–0.4)
EOSINOPHIL NFR BLD: 2 % (ref 0–7)
ERYTHROCYTE [DISTWIDTH] IN BLOOD BY AUTOMATED COUNT: 12.6 % (ref 11.5–14.5)
EST. AVERAGE GLUCOSE BLD GHB EST-MCNC: 252 MG/DL
GLOBULIN SER CALC-MCNC: 3.2 G/DL (ref 2–4)
GLUCOSE BLD STRIP.AUTO-MCNC: 130 MG/DL (ref 65–117)
GLUCOSE BLD STRIP.AUTO-MCNC: 140 MG/DL (ref 65–117)
GLUCOSE BLD STRIP.AUTO-MCNC: 171 MG/DL (ref 65–117)
GLUCOSE BLD STRIP.AUTO-MCNC: 266 MG/DL (ref 65–117)
GLUCOSE SERPL-MCNC: 165 MG/DL (ref 65–100)
HBA1C MFR BLD: 10.4 % (ref 4–5.6)
HCT VFR BLD AUTO: 38.8 % (ref 36.6–50.3)
HDLC SERPL-MCNC: 48 MG/DL
HDLC SERPL: 3.6 {RATIO} (ref 0–5)
HGB BLD-MCNC: 13.1 G/DL (ref 12.1–17)
IMM GRANULOCYTES # BLD AUTO: 0 K/UL (ref 0–0.04)
IMM GRANULOCYTES NFR BLD AUTO: 0 % (ref 0–0.5)
LDLC SERPL CALC-MCNC: 113.4 MG/DL (ref 0–100)
LYMPHOCYTES # BLD: 2.9 K/UL (ref 0.8–3.5)
LYMPHOCYTES NFR BLD: 41 % (ref 12–49)
MCH RBC QN AUTO: 30.8 PG (ref 26–34)
MCHC RBC AUTO-ENTMCNC: 33.8 G/DL (ref 30–36.5)
MCV RBC AUTO: 91.1 FL (ref 80–99)
MONOCYTES # BLD: 0.5 K/UL (ref 0–1)
MONOCYTES NFR BLD: 8 % (ref 5–13)
NEUTS SEG # BLD: 3.5 K/UL (ref 1.8–8)
NEUTS SEG NFR BLD: 48 % (ref 32–75)
NRBC # BLD: 0 K/UL (ref 0–0.01)
NRBC BLD-RTO: 0 PER 100 WBC
P-R INTERVAL, ECG05: 204 MS
PLATELET # BLD AUTO: 147 K/UL (ref 150–400)
PMV BLD AUTO: 11.3 FL (ref 8.9–12.9)
POTASSIUM SERPL-SCNC: 4 MMOL/L (ref 3.5–5.1)
PROT SERPL-MCNC: 6.4 G/DL (ref 6.4–8.2)
Q-T INTERVAL, ECG07: 360 MS
QRS DURATION, ECG06: 72 MS
QTC CALCULATION (BEZET), ECG08: 433 MS
RBC # BLD AUTO: 4.26 M/UL (ref 4.1–5.7)
SERVICE CMNT-IMP: ABNORMAL
SODIUM SERPL-SCNC: 138 MMOL/L (ref 136–145)
TRIGL SERPL-MCNC: 68 MG/DL (ref ?–150)
TROPONIN-HIGH SENSITIVITY: 13 NG/L (ref 0–76)
TSH SERPL DL<=0.05 MIU/L-ACNC: 1.3 UIU/ML (ref 0.36–3.74)
VENTRICULAR RATE, ECG03: 87 BPM
VLDLC SERPL CALC-MCNC: 13.6 MG/DL
WBC # BLD AUTO: 7.2 K/UL (ref 4.1–11.1)

## 2022-02-09 PROCEDURE — 80061 LIPID PANEL: CPT

## 2022-02-09 PROCEDURE — 97535 SELF CARE MNGMENT TRAINING: CPT | Performed by: OCCUPATIONAL THERAPIST

## 2022-02-09 PROCEDURE — 71045 X-RAY EXAM CHEST 1 VIEW: CPT

## 2022-02-09 PROCEDURE — 80053 COMPREHEN METABOLIC PANEL: CPT

## 2022-02-09 PROCEDURE — 83036 HEMOGLOBIN GLYCOSYLATED A1C: CPT

## 2022-02-09 PROCEDURE — 99223 1ST HOSP IP/OBS HIGH 75: CPT | Performed by: PSYCHIATRY & NEUROLOGY

## 2022-02-09 PROCEDURE — 97161 PT EVAL LOW COMPLEX 20 MIN: CPT

## 2022-02-09 PROCEDURE — 74011250637 HC RX REV CODE- 250/637: Performed by: INTERNAL MEDICINE

## 2022-02-09 PROCEDURE — 92610 EVALUATE SWALLOWING FUNCTION: CPT

## 2022-02-09 PROCEDURE — 84443 ASSAY THYROID STIM HORMONE: CPT

## 2022-02-09 PROCEDURE — 65660000000 HC RM CCU STEPDOWN

## 2022-02-09 PROCEDURE — 97165 OT EVAL LOW COMPLEX 30 MIN: CPT | Performed by: OCCUPATIONAL THERAPIST

## 2022-02-09 PROCEDURE — 85025 COMPLETE CBC W/AUTO DIFF WBC: CPT

## 2022-02-09 PROCEDURE — 74011636637 HC RX REV CODE- 636/637: Performed by: INTERNAL MEDICINE

## 2022-02-09 PROCEDURE — 84484 ASSAY OF TROPONIN QUANT: CPT

## 2022-02-09 PROCEDURE — 36415 COLL VENOUS BLD VENIPUNCTURE: CPT

## 2022-02-09 PROCEDURE — 74011250636 HC RX REV CODE- 250/636: Performed by: INTERNAL MEDICINE

## 2022-02-09 PROCEDURE — 97530 THERAPEUTIC ACTIVITIES: CPT

## 2022-02-09 PROCEDURE — 93306 TTE W/DOPPLER COMPLETE: CPT

## 2022-02-09 PROCEDURE — 82962 GLUCOSE BLOOD TEST: CPT

## 2022-02-09 RX ORDER — IBUPROFEN 200 MG
1 TABLET ORAL
Status: DISCONTINUED | OUTPATIENT
Start: 2022-02-09 | End: 2022-02-10 | Stop reason: HOSPADM

## 2022-02-09 RX ORDER — AMLODIPINE BESYLATE 5 MG/1
5 TABLET ORAL DAILY
Status: DISCONTINUED | OUTPATIENT
Start: 2022-02-09 | End: 2022-02-10

## 2022-02-09 RX ORDER — ATORVASTATIN CALCIUM 40 MG/1
40 TABLET, FILM COATED ORAL DAILY
Status: DISCONTINUED | OUTPATIENT
Start: 2022-02-09 | End: 2022-02-09

## 2022-02-09 RX ORDER — ATORVASTATIN CALCIUM 40 MG/1
80 TABLET, FILM COATED ORAL DAILY
Status: DISCONTINUED | OUTPATIENT
Start: 2022-02-10 | End: 2022-02-10 | Stop reason: HOSPADM

## 2022-02-09 RX ADMIN — ATORVASTATIN CALCIUM 40 MG: 40 TABLET, FILM COATED ORAL at 08:51

## 2022-02-09 RX ADMIN — Medication 2 UNITS: at 12:15

## 2022-02-09 RX ADMIN — ASPIRIN 81 MG: 81 TABLET, CHEWABLE ORAL at 08:51

## 2022-02-09 RX ADMIN — AMLODIPINE BESYLATE 5 MG: 5 TABLET ORAL at 15:49

## 2022-02-09 RX ADMIN — INSULIN GLARGINE 16 UNITS: 100 INJECTION, SOLUTION SUBCUTANEOUS at 21:38

## 2022-02-09 RX ADMIN — Medication 3 UNITS: at 21:38

## 2022-02-09 RX ADMIN — Medication 2 UNITS: at 08:51

## 2022-02-09 RX ADMIN — ENOXAPARIN SODIUM 40 MG: 100 INJECTION SUBCUTANEOUS at 08:51

## 2022-02-09 NOTE — PROGRESS NOTES
Transition of Care Plan:    RUR: 7%  Disposition: home with spouse  >OP PT vs HHC pending progress   Follow up appointments: PCP, specialists as indicated   DME needed: RW  Transportation at Discharge: spouse will transport home   [place] or means to access home: yes  IM Medicare Letter: N/A, has [employer]   Is patient a BCPI-A Bundle: No       If yes, was Bundle Letter given?:    Is patient a  and connected with the [place]? No            If yes, was [place] transfer form completed and [facility] notified? Caregiver Contact: Carito Hartmann (spouse) [phone]  Discharge Caregiver contacted prior to discharge? Yes, at bedside   Care Conference needed?: No    Reason for Admission:  CVA                     RUR Score: 7%                    Plan for utilizing home health: TBD- IPR recommended, pt/family declined        PCP: First and Last name:  Leo Mary MD     Name of Practice:    Are you a current patient: Yes/No: yes     Approximate date of last visit: January 2022    Can you participate in a virtual visit with your PCP: yes                    Current Advanced Directive/Advance Care Plan: Full Code    Eunice 13 (ACP) Conversation    Date of Conversation: 2/9/22    Conducted with: Healthcare Decision Maker: Next of Kin by law (only applies in absence of a Healthcare Power of  or Legal Guardian)    Healthcare Decision Maker:   No healthcare decision makers have been documented. Click here to complete [address] including selection of the Healthcare Decision Maker Relationship (ie \"Primary\")    Today we documented Decision Maker(s) consistent with Legal Next of Kin hierarchy.     Content/Action Overview:   DECLINED ACP conversation - will revisit periodically   Reviewed DNR/DNI and patient elects Full Code (Attempt Resuscitation)    Length of Voluntary ACP Conversation in minutes:  <16 minutes (Non-Billable)    Kyeur Kiran Transition of Care Plan: home with spouse, possible OP PT vs HHC pending progress    Chart reviewed. Met with pt/spouse at bedside to complete assessment today. Pt was resting and deferred assessment to spouse at bedside. Verified contact information and demographics. Pt resides with spouse in a two level [facility] with no steps to enter. There are 12 steps inside and bedroom is on 2nd floor. Pt is typically independent with ADLs requiring extra time per spouse. Pt ambulates without DME at baseline. He does not own any DME at home. 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 PCP is Dr. Kamara Towner with last visit about a month ago. Preferred pharmacy is "[facility] on [address] [place] [address]. No prior hx of SNF/rehab stays or HHC reported at this time. Pt w/ hx of OP therapy a few years ago. Spouse will transport home at time of d/c. No ACP on file. Spouse is legal NOK. Spouse identifies adequate family support. Discussed recommendation for IPR placement at d/c. Per spouse, pt will not agree to this and is already eager to leave the hospital and wants to go today. Per spouse, pt left AMA prior hospitalization in August 2021. She shared that he is a \"smoker\" and inpatient options will not work for him. CM attempted to meet with pt later today at bedside to discuss [facility] vs OP PT. Pt was asleep at the time and spouse was not in room. Anticipate d/c tomorrow. Will continue to follow. Care Management Interventions  PCP Verified by CM: Yes  Palliative Care Criteria Met (RRAT>21 & CHF Dx)?: No  Mode of Transport at Discharge:  Other (see comment) (spouse )  Transition of Care Consult (CM Consult): Discharge Planning  Discharge Durable Medical Equipment: No  Physical Therapy Consult: Yes  Occupational Therapy Consult: Yes  Speech Therapy Consult: Yes  Support Systems: Other Family Member(s),Spouse/Significant Other  Confirm Follow Up Transport: Family  The Plan for Transition of Care is Related to the Following Treatment Goals : home with spouse  The Patient and/or Patient Representative was Provided with a Choice of Provider and Agrees with the Discharge Plan?: Yes  Freedom of Choice List was Provided with Basic Dialogue that Supports the Patient's Individualized Plan of Care/Goals, Treatment Preferences and Shares the Quality Data Associated with the Providers?: No  [place] Resource Information Provided?: No  Discharge Location  Patient Expects to be Discharged to[de-identified] [address] [place] [address]  [phone]

## 2022-02-09 NOTE — ED NOTES
Patient is being transferred to [facility] ICU, Room # [phone]. Report given to Mairja Haley RN on Jarome Reading for routine progression of care. Report consisted of the following information SBAR, Kardex, ED Summary, MAR, Recent Results and Med Rec Status. Patient transferred to receiving unit by: Tech (RN or tech name). Outstanding consults needed: No     Next labs due: No     The following personal items will be sent with the patient during transfer to the floor: All valuables:    Cardiac monitoring ordered: Yes    The following CURRENT information was reported to the receiving RN:    Code status: Full Code at time of transfer    Last set of vital signs:  Vital Signs  Level of Consciousness: Alert (0) (02/09/22 0237)  Temp: 97.8 °F (36.6 °C) (02/09/22 0237)  Temp Source: Oral (02/09/22 0237)  Pulse (Heart Rate): 62 (02/09/22 0237)  Resp Rate: 13 (02/09/22 0237)  BP: (!) 154/102 (02/09/22 0237)  MAP (Monitor): 79 (02/09/22 0017)  MAP (Calculated): 119 (02/09/22 0237)  BP 1 Location: Left upper arm (02/09/22 0237)  BP 1 Method: Automatic (02/09/22 0237)  BP Patient Position: At rest (02/09/22 0237)  MEWS Score: 0 (02/09/22 0237)         Oxygen Therapy  O2 Sat (%): 98 % (02/09/22 0237)  Pulse via Oximetry: 84 beats per minute (02/09/22 0017)      Last pain assessment:  Pain 1  Pain Scale 1: Numeric (0 - 10)      Wounds: No     Urinary catheter: voiding  Is there a nowak order: No     LDAs:            Opportunity for questions and clarification was provided.     Yogesh Fraire RN

## 2022-02-09 NOTE — PROGRESS NOTES
Hospitalist Progress Note    NAME: Alberto Middleton   :  1965   MRN:  [id number]       Assessment / Plan:    CVA (cerebral vascular accident) [facility]) : R parietal infarct   Elevated blood pressure  History of diabetes, vertigo and previous stroke   Denies taking aspirin currently  Presented with left arm and leg weakness when he awoke this am              Some baseline left weakness                          Clinic notes from 2022 describe pt having to drag his left leg              Kenyatta Sharps it felt worse this am  Interestingly had an MRI  Brain as outpatient 1/10/2022               Small area of diffusion signal abnormality and enhancement left caudate head                          most consistent with subacute infarction new since 2021. Admit head CT with No acute intracranial process identified  Admit CTA head and neck prelim with no large vessel occlusion   Stroke risk factors: DM, tobacco, elevated cholesterol, family history, brother had a stroke  MRI of the brain showed right parietal infarct  2D echo pending  .4. Continue with baby aspirin and high-dose statin  HbA1c 10.4    Diabetes mellitus type 2 POA, uncontrolled  Home regimen: insulin 70/30 due to cost, oral meds  Diabetic diet  We will continue with Lantus and sliding scale insulin while inpatient  HbA1c 10.4  Hyperlipidemia POA  Continue statin  PRN labetalol  Not currently on BP meds at home, started on Norvasc as BP elevated       Tobacco use POA  1 PPD  Counseled on importance of quitting  Nicotine patch PRN  Updated wife at bedside    30.0 - 39.9 Obese / Body mass index is 38.36 kg/m². Estimated discharge date: February 10  Barriers:    Code status: Full  Prophylaxis: Lovenox  Recommended Disposition: Patient wants to go home with home health versus outpatient rehab   Barriers: Uncontrolled blood pressure     Subjective:     Chief Complaint / Reason for Physician Visit  Follow-up CVA. No acute issues.   Patient reported weakness on his left upper arm discussed with RN events overnight. Review of Systems:  Symptom Y/N Comments  Symptom Y/N Comments   Fever/Chills n   Chest Pain n    Poor Appetite    Edema     Cough    Abdominal Pain n    Sputum    Joint Pain     SOB/BARROSO n   Pruritis/Rash     Nausea/vomit    Tolerating PT/OT     Diarrhea    Tolerating Diet y    Constipation    Other       Could NOT obtain due to:      Objective:     VITALS:   Last 24hrs VS reviewed since prior progress note. Most recent are:  Patient Vitals for the past 24 hrs:   Temp Pulse Resp BP SpO2   02/09/22 1600 -- 70 -- -- --   02/09/22 1500 98.7 °F (37.1 °C) 70 18 (!) 145/71 98 %   02/09/22 1226 97.5 °F (36.4 °C) 72 18 (!) 173/97 97 %   02/09/22 1200 -- 72 -- -- --   02/09/22 0952 -- -- -- (!) 173/115 --   02/09/22 0937 -- 72 -- (!) 157/101 --   02/09/22 0800 -- 72 -- -- --   02/09/22 0706 98.1 °F (36.7 °C) 68 16 (!) 173/115 97 %   02/09/22 0335 97.9 °F (36.6 °C) 76 15 (!) 173/105 95 %   02/09/22 0237 97.8 °F (36.6 °C) 62 13 (!) 154/102 98 %   02/09/22 0017 -- 85 14 126/66 94 %   02/08/22 2332 -- 82 16 (!) 145/73 93 %   02/08/22 2247 -- 81 15 (!) 147/86 94 %   02/08/22 2217 -- 86 17 (!) 161/95 96 %   02/08/22 2147 -- 75 16 128/88 93 %   02/08/22 2143 -- -- -- -- 99 %   02/08/22 1947 -- 81 16 (!) 143/97 99 %   02/08/22 1917 -- 80 16 (!) 149/98 96 %       Intake/Output Summary (Last 24 hours) at 2/9/2022 1612  Last data filed at 2/9/2022 0732  Gross per 24 hour   Intake --   Output 650 ml   Net -650 ml        I had a face to face encounter and independently examined this patient on 2/9/2022, as outlined below:  PHYSICAL EXAM:  General: WD, WN. Alert, cooperative, no acute distress    EENT:  EOMI. Anicteric sclerae. MMM  Resp:  CTA bilaterally, no wheezing or rales. No accessory muscle use  CV:  Regular  rhythm,  No edema  GI:  Soft, Non distended, Non tender.   +Bowel sounds  Neurologic:  Alert and oriented X 3, normal speech, left upper arm strength 3 out of 5  Psych:   fair insight. Not anxious nor agitated  Skin:  No rashes. No jaundice    Reviewed most current lab test results and cultures  YES  Reviewed most current radiology test results   YES  Review and summation of old records today    NO  Reviewed patient's current orders and MAR    YES  PMH/SH reviewed - no change compared to H&P  ________________________________________________________________________  Care Plan discussed with:    Comments   Patient x    Family  x Wife  at bedside   RN x    Care Manager     Consultant                        Multidiciplinary team rounds were held today with , nursing, pharmacist and clinical coordinator. Patient's plan of care was discussed; medications were reviewed and discharge planning was addressed. ________________________________________________________________________  Total NON critical care TIME: 35  Minutes    Total CRITICAL CARE TIME Spent:   Minutes non procedure based      Comments   >50% of visit spent in counseling and coordination of care     ________________________________________________________________________  Marquis Love MD     Procedures: see electronic medical records for all procedures/Xrays and details which were not copied into this note but were reviewed prior to creation of Plan. LABS:  I reviewed today's most current labs and imaging studies.   Pertinent labs include:  Recent Labs     02/09/22 0229 02/08/22  1641   WBC 7.2 7.6   HGB 13.1 14.0   HCT 38.8 42.4   * 162     Recent Labs     02/09/22  0229 02/08/22  1641    138   K 4.0 4.2    104   CO2 28 31   * 209*   BUN 9 10   CREA 0.67* 0.89   CA 8.6 9.1   ALB 3.2* 3.7   TBILI 0.3 0.3   ALT 15 20   INR  --  1.0       Signed: Marquis Love MD

## 2022-02-09 NOTE — ED NOTES
2300: Pt sleeping at this time. Will continue to monitor. 0100: Pt sleeping at this time. Will continue to monitor.

## 2022-02-09 NOTE — PROGRESS NOTES
SPEECH PATHOLOGY BEDSIDE SWALLOW EVALUATION/DISCHARGE  Patient: Felicia Powell (55 y.o. male)  Date: 2/9/2022  Primary Diagnosis: CVA (cerebral vascular accident) [facility]) [I63.9]       Precautions:       ASSESSMENT :  Based on the objective data described below, the patient presents with functional speech, language and swallowing. He ate solids, thins and purees well. No overt s/s of aspiration. He was unhappy because he was hungry and then when told he could not have breakfast due to testing, he wanted to know when he could eat. We will sign off  Discussed his risk factors for stroke being HTN, high cholesterol, DM. Skilled acute therapy provided by a speech-language pathologist is not indicated at this time. PLAN :  Recommendations:  Reg/thins  Discharge Recommendations: None     SUBJECTIVE:   Patient stated he was hungry and needed breakfast.     OBJECTIVE:     Past Medical History:   Diagnosis Date    Abscess     Diabetes [facility] [address]     Hypercholesterolemia     Sleep apnea     Vertigo    History reviewed. No pertinent surgical history.   Prior Level of Function/Home Situation:   Home Situation  Home Environment: Rehabilitation facility  # Steps to Enter: 4  One/Two Story Residence: Two story  # of Interior Steps: 15  Height of Each Step (in): 7 inches  Interior Rails: Both  Lift Chair Available: No  Living Alone: No  Support Systems: Spouse/Significant Other  Patient Expects to be Discharged to[de-identified] Home  Current DME Used/Available at Home: None  Diet prior to admission: reg/thins  Current Diet: NPO  Cognitive and Communication Status:  Neurologic State: Alert,Drowsy  Orientation Level: Oriented X4  Cognition: Appropriate decision making,Appropriate for age attention/concentration,Appropriate safety awareness,Follows commands  Perception: Appears intact  Perseveration: No perseveration noted     Oral Assessment:  Oral Assessment  Labial: No impairment  Dentition: Full  Lingual: No impairment  Mandible: No impairment  P.O. Trials:  Patient Position: upright in bed  Vocal quality prior to P.O.: No impairment  Consistency Presented: Thin liquid;Puree;Mechanical soft  How Presented: Self-fed/presented;Cup/sip; Successive swallows     Bolus Acceptance: No impairment  Bolus Formation/Control: No impairment     Propulsion: No impairment  Oral Residue: None  Initiation of Swallow: No impairment  Laryngeal Elevation: Functional  Aspiration Signs/Symptoms: None  Pharyngeal Phase Characteristics: No impairment, issues, or problems   Effective Modifications: None  Cues for Modifications: None       Oral Phase Severity: No impairment     NOMS:   The NOMS functional outcome measure was used to quantify this patient's level of swallowing impairment. Based on the NOMS, the patient was determined to be at level 7 for swallow function     NOMS Swallowing Levels:  Level 1 (CN): NPO  Level 2 (CM): NPO but takes consistency in therapy  Level 3 (CL): Takes less than 50% of nutrition p.o. and continues with nonoral feedings; and/or safe with mod cues; and/or max diet restriction  Level 4 (CK): Safe swallow but needs mod cues; and/or mod diet restriction; and/or still requires some nonoral feeding/supplements  Level 5 (CJ): Safe swallow with min diet restriction; and/or needs min cues  Level 6 (CI): Independent with p.o.; rare cues; usually self cues; may need to avoid some foods or needs extra time  Level 7 ([address] [place] [address]): Independent for all p.o.  ZACH. (2003). National Outcomes Measurement System (NOMS): Adult Speech-Language Pathology User's Guide. Pain:  Pain Scale 1: Numeric (0 - 10)  Pain Intensity 1: 0     After treatment:   Patient left in no apparent distress in bed    COMMUNICATION/EDUCATION:   Educated that he is able to eat a regular diet/thins     The patient's plan of care including recommendations, planned interventions, and recommended diet changes were discussed with: Registered nurse.      Thank you for this referral.  Elise Childs Delia Ing, SLP  Time Calculation: 10 mins

## 2022-02-09 NOTE — PROGRESS NOTES
End of Shift Note    Bedside shift change report given to JerryRN  (oncoming nurse) by Mackenzie Leger RN (offgoing nurse). Report included the following information SBAR, Kardex, ED Summary and MAR    Shift worked:  Nights    Shift summary and any significant changes:     Pt sleeping most of the night. No complaints of pain. Left hand is slighty swollen +1, non- pitting at admission. At end of shift hand has become red and warm. Concerns for physician to address:  See above    Zone phone for oncoming shift:   [phone]     Patient Information  Eusebio Quarles  55 y.o.  2/8/2022  5:37 PM by Lore Castellano MD. Eusebio Quarles was admitted from Home    Problem List  Patient Active Problem List    Diagnosis Date Noted    [facility]A [facility]cerebral vascular accident) ([facility] [address] 08/16/2021    Dizziness 08/10/2021    Obesity, morbid ([address] 05/01/2018    Non compliance w medication regimen 09/30/2016    Essential hypertension 02/26/2016    Hyperlipidemia with target LDL less than 70 09/28/2015    Type 2 diabetes mellitus without complication ([address] 60/51/4097    Abscess of bursa of left knee 06/10/2013    Cellulitis 08/21/2012     Past Medical History:   Diagnosis Date    Abscess     Diabetes ([address]     Hypercholesterolemia     Sleep apnea     Vertigo        Core Measures:  CVA: Yes Yes  CHF:No Not applicable  PNA:No Not applicable    Activity:  Activity Level: Up with Assistance  Number times ambulated in hallways past shift: 0  Number of times OOB to chair past shift: 0    Cardiac:   Cardiac Monitoring: Yes           Access:   Current line(s): PIV   Central Line? No Placement date   PICC LINE? No Placement date     Genitourinary:   Urinary status: voiding  Urinary Catheter?  No Placement Date     Respiratory:      Chronic home O2 use?: NO  Incentive spirometer at bedside: N/A       GI:  Last Bowel Movement Date: 01/09/22  Current diet:  DIET NPO  Passing flatus: YES  Tolerating current diet: YES       Pain Management:   Patient states pain is manageable on current regimen: YES    Skin:  Armando Score: 20  Interventions: speciality bed    Patient Safety:  Fall Score: Total Score: 2  Interventions: bed/chair alarm, assistive device (walker, cane, etc), gripper socks, pt to call before getting OOB and stay with me (per policy)     @Rollbelt  @dexterity to release roll belt  Yes/No ( must document dexterity  here by stating Yes or No here, otherwise this is a restraint and must follow restraint documentation policy.)    DVT prophylaxis:  DVT prophylaxis Med- Yes  DVT prophylaxis SCD or ADAL- No     Wounds: (If Applicable)  Wounds- Yes  Location Upper back, abscess that is oozing. Present on admission.       Active Consults:  IP CONSULT TO HOSPITALIST  IP CONSULT TO NEUROLOGY    Length of Stay:  Expected LOS: - - -  Actual LOS: 1  Discharge Plan: Yes Home       Ana Pop RN

## 2022-02-09 NOTE — DIABETES MGMT
[address] [facility]    CLINICAL NURSE SPECIALIST CONSULT     Initial Presentation   Lisa Lazar is a 55 y.o. male admitted with stroke-like symptoms. The patient reports having weakness in left arm prior to leaving home for work. While at work the patient and increased weakness and fell. After several minutes  the patient was able to pull himself up and call EMS. Here he underwent stroke work-up including a CTA head and neck and an MRI of the brain that revealed a right lacunar infarction. Of note patient does report that he had symptoms of a stroke in August 2021. HX:   Past Medical History:   Diagnosis Date    Abscess     Diabetes ([facility] [address]     Hypercholesterolemia     Sleep apnea     Vertigo         INITIAL DX:   [facility]ce[facility] [I63.9]     Current Treatment     TX: Clot prevention. Lipitor. BP management. Consulted by Provider for advanced diabetes nursing assessment and care for:   [] Transitioning off Arliss Persons   [x] Inpatient management strategy  [x] Home management assessment  [] Survival skill education    Hospital Course   Clinical progress has been uncomplicated. Diabetes History   The patient reports a 10 year diabetes hx. He takes 12 units BID of 70/30 insulin at home. He also takes glucotrol. The patient has a positive family hx of diabetes, his mother and sister. He is a smoker. The patient works as a heavy equipment . He follows with a PCP, Ame Cornelius M.D. He resides with his wife. She is at the bedside. Diabetes-related Medical History  Macrovascular disease  Cerebral vascular accident      Diabetes Medication History  Key Antihyperglycemic Medications             insulin nph-regular human rec (HUMULIN 70-30) 100 unit/mL (70-30) inpn 12 Units by SubCUTAneous route two (2) times daily (with meals). SITagliptin-metFORMIN (Janumet) 50-1,000 mg per tablet Take 1 Tablet by mouth two (2) times daily (with meals). glipiZIDE (GLUCOTROL) 10 mg tablet Take 1 Tablet by mouth two (2) times a day. Diabetes self-management practices:   Eating pattern   [x] Not eating a carbohydrate-controlled mealplan  [x] Breakfast Eggs, sausage, 4 slices of bread, rice  [x] Lunch  bologna or hotdog  [x] Dinner  Porkchop, corn, fried/bake chicken, 4 slices of bread. [x] Snacks Cakes, chips  [x] Beverages Mountain dew  Physical activity pattern   [x] Not employing a physical activity program to control BG   Monitoring pattern   [x] Not testing BGs sufficiently to inform self-management adjustments*  Taking medications pattern  [x] Inconsistent administration  [x] Affordable  Social determinants of health impacting diabetes self-management practices   Concerned that you need to know more about how to stay healthy with diabetes  Overall evaluation:    [x] Not achieving A1c target with drug therapy & self-care practices    Subjective   I don't know anything about diabetes, I just know I have it.      Objective   Physical exam  General Obese male in no acute distress.  Conversant and cooperative  Neuro  Alert, oriented   Vital Signs   Visit Vitals  BP (!) 145/71   Pulse 70   Temp 98.7 °F (37.1 °C)   Resp 18   Ht 5' 7\" (1.702 m)   Wt 111.1 kg (244 lb 14.9 oz)   SpO2 98%   BMI 38.36 kg/m²       Laboratory  Recent Labs     02/09/22  0229 02/08/22  1641   * 209*   AGAP 4* 3*   TRIGL 68  --    WBC 7.2 7.6   CREA 0.67* 0.89   GFRNA >60 >60   AST 6* 10*   ALT 15 20       Factors impacting BG management  Factor Dose Comments   Nutrition:  Standard meals   75 grams/meal    Other:   Kidney function  Liver function      Normal  AST 6     Blood glucose pattern      Significant diabetes-related events over the past 24-72 hours  BG's ranging 140-171 today    Assessment and Plan   Nursing Diagnosis Risk for unstable blood glucose pattern   Nursing Intervention Domain 6227 Decision-making Support   Nursing Interventions Examined current inpatient diabetes/blood glucose control   Explored factors facilitating and impeding inpatient management  Explored corrective strategies with patient and responsible inpatient provider   Informed patient of rational for insulin strategy while hospitalized     Nursing Diagnosis 05201 Ineffective Health Management   Nursing Intervention Domain 12 Decision-makingSupport   Nursing Interventions Identified diabetes self-management practices impeding diabetes control  Discussed diabetes survival skills related to  1. Healthy Plate eating plan; given handouts  2. Role of physical activity in improving insulin sensitivity and action  3. Procedure for blood glucose monitoring & options for low-cost products available from [facility]   4. Medications plan at discharge     Evaluation   This 55year old, AA, male patient with Type 2 diabetes did not achieved BG control prior to admission as evidenced by an A1C of 10.4%. The patient takes 12 units BID  70/30 insulin and glucotrol at home. The patient reports seeing BG's between 120-140's at home. However the A1C suggest the BG's have been elevated. The patient's are stable inpatient using 16 units Lantus and very little correction insulin. I would recommend that the patient resume home diabetes medications at discharge. I discussed the importance of adhering to the diabetes medication regimen with the patient. Diet and blood glucose monitoring also discussed with patient and his spouse. Handouts provided. This patient would benefit from diabetes self-management education and support NABEEL [facility]) after discharge. Discharge Recommendations    Resume diabetes home medications  And follow up with PCP.        [x] Referral to  [x] Program for Diabetes [facility] (Phone [phone] to schedule appointment) for [facility]      Billing Code(s)   [x] 48286 IP subsequent hospital care - 35 minutes [] 15674 Prolonged Services - 65 minutes [] 59444 Prolonged Services - 110 minutes  [] 01587 IP subsequent hospital care - 25 minutes [] 57384 Prolonged Services - 55 minutes [] 09530 Prolonged Services - 100 minutes  [] 34430 IP subsequent hospital care - 15 minutes [] 93265 Prolonged Services - 45 minutes [] 94875 Prolonged Services - 90 minutes    Before making these care recommendations, I personally reviewed the hospitalization record, including notes, laboratory & diagnostic data and current medications, and examined the patient at the bedside (circumstances permitting) before making care recommendations. More than fifty (50) percent of the time was spent in patient counseling and/or care coordination.   Total minutes: 35 minutes    Leatrice Jeans, CNS  Diabetes Clinical Nurse Specialist  [facility]  Access via [employer]

## 2022-02-09 NOTE — PROGRESS NOTES
Physical Therapy:  Orders received, chart reviewed, and evaluation completed. Patient completes bed mobility Indep, transfers CGA, and ambulation Min A x25ft with RW. Demonstrates L hemiparesis and impaired standing balance/gait mechanics. Recommend acute rehab at discharge. Full note to follow.     April Amanda Rodriguez PT, DPT

## 2022-02-09 NOTE — PROGRESS NOTES
End of Shift Note     Bedside shift change report given to Gin Perry RN  (oncoming nurse) by Ran Fraire RN (offgoing nurse). Report included the following information SBAR, Kardex, ED Summary and MAR     Shift worked:  days   Shift summary and any significant changes:      no significant changes, family has been at bedside most of the day         Concerns for physician to address:  none   Zone phone for oncoming shift:   [phone]      Patient Information  Radha Diaz  55 y.o.  2/8/2022  5:37 PM by Thelma Abraham MD. Radha Diaz was admitted from Home     Problem List       Patient Active Problem List     Diagnosis Date Noted    CVA (cerebral vascular accident) ([facility] 08/16/2021    Dizziness 08/10/2021    Obesity, morbid ([address] 05/01/2018    Non compliance w medication regimen 09/30/2016    Essential hypertension 02/26/2016    Hyperlipidemia with target LDL less than 70 09/28/2015    Type 2 diabetes mellitus without complication ([facility] 02/96/4934    Abscess of bursa of left knee 06/10/2013    Cellulitis 08/21/2012           Past Medical History:   Diagnosis Date    Abscess      Diabetes ([facility]      Hypercholesterolemia      Sleep apnea      Vertigo           Core Measures:  CVA: Yes Yes  CHF:No Not applicable  PNA:No Not applicable     Activity:  Activity Level: Up with Assistance  Number times ambulated in hallways past shift: 0  Number of times OOB to chair past shift: 0     Cardiac:   Cardiac Monitoring: Yes         Access:   Current line(s): PIV   Central Line? No Placement date   PICC LINE? No Placement date      Genitourinary:   Urinary status: voiding  Urinary Catheter?  No Placement Date      Respiratory:   Chronic home O2 use?: NO  Incentive spirometer at bedside: N/A     GI:  Last Bowel Movement Date: 01/09/22  Current diet:  DIET NPO  Passing flatus: YES  Tolerating current diet: YES     Pain Management:   Patient states pain is manageable on current regimen: YES     Skin:  Armando Score: 20  Interventions: speciality bed    Patient Safety:  Fall Score: Total Score: 2  Interventions: bed/chair alarm, assistive device (walker, cane, etc), gripper socks, pt to call before getting OOB and stay with me (per policy)  @Rollbelt  @dexterity to release roll belt  Yes/No ( must document dexterity  here by stating Yes or No here, otherwise this is a restraint and must follow restraint documentation policy.)     DVT prophylaxis:  DVT prophylaxis Med- Yes  DVT prophylaxis SCD or ADAL- No      Wounds: (If Applicable)  Wounds- Yes  Location Upper back, abscess that is oozing.  Present on admission.       Active Consults:  IP CONSULT TO HOSPITALIST  IP CONSULT TO NEUROLOGY     Length of Stay:  Expected LOS: - - -  Actual LOS: 1  Discharge Plan: Yes Home         Aline Rivera RN

## 2022-02-09 NOTE — PROGRESS NOTES
Problem: Self Care Deficits Care Plan (Adult)  Goal: *Acute Goals and Plan of Care (Insert Text)  Description: FUNCTIONAL STATUS PRIOR TO ADMISSION: Patient was independent and active without use of DME. Still works as a . HOME SUPPORT: The patient lived with his wife but did not require assist.    Occupational Therapy Goals  Initiated 2/9/2022   1. Patient will perform self-feeding with modified independence within 7 day(s). 2.  Patient will perform grooming using L UE as a functional assist with SBA within 7 day(s). 3.  Patient will perform lower body dressing with supervision/SBA within 7 day(s). 4.  Patient will perform toilet transfers with modified independence within 7 day(s). 5.  Patient will perform all aspects of toileting with supervision/SBA within 7 day(s). 6.  Patient will participate in upper extremity therapeutic exercise/activities with Min cues within 7 day(s). 7.  Patient will improve their Fugl Moya score by 5 points in prep for ADLs within 7 days. Outcome: Not Met    OCCUPATIONAL THERAPY EVALUATION  Patient: Kenia Chiu (89 y.o. male)  Date: 2/9/2022  Primary Diagnosis: CVA (cerebral vascular accident) [facility]) [I63.9]        Precautions:  Fall    ASSESSMENT  Based on the objective data described below, the patient presents with deficits in self-care, primarily due to decreased activity tolerance, impaired L UE and LE coordination, impaired L UE/LE strength, decreased safety, decreased insight into deficits, impaired standing balance, and mild deficits in tracking/visual attention to the left side. Patient is impulsive, and requires cues throughout for safety. He is concerned about deficits on L side and how they will impact function. Discussed that he needs MD clearance before driving. Patient will benefit from skilled OT treatment to maximize safety and independence in ADL. He is an excellent candidate for inpatient rehab.     Current Level of Function Impacting Discharge (ADLs/self-care): up to Mod A    Functional Outcome Measure: The patient scored Total A-D  Total A-D (Motor Function): 49/66 on the Fugl-Moya Assessment which is indicative of mild impairment in upper extremity functional status. Patient will benefit from skilled therapy intervention to address the above noted impairments. PLAN :  Recommendations and Planned Interventions: self care training, functional mobility training, therapeutic exercise, balance training, visual/perceptual training, therapeutic activities, cognitive retraining, endurance activities, neuromuscular re-education, patient education, home safety training, and family training/education    Frequency/Duration: Patient will be followed by occupational therapy 5 times a week to address goals. Recommendation for discharge: (in order for the patient to meet his/her long term goals)  Therapy 3 hours per day 5-7 days per week    This discharge recommendation:  A follow-up discussion with the attending provider and/or case management is planned    IF patient discharges home will need the following DME: Defer to facility       SUBJECTIVE:   Patient stated Is this going to get any better?     OBJECTIVE DATA SUMMARY:   HISTORY:   Past Medical History:   Diagnosis Date    Abscess     Diabetes ([facility] [address]     Hypercholesterolemia     Sleep apnea     Vertigo    History reviewed. No pertinent surgical history.   Expanded or extensive additional review of patient history:     Home Situation  Home Environment: Rehabilitation facility  # Steps to Enter: 4  One/Two Story Residence: Two story  # of Interior Steps: 15  Height of Each Step (in): 7 inches  Interior Rails: Both  Lift Chair Available: No  Living Alone: No  Support Systems: Spouse/Significant Other  Patient Expects to be Discharged to[de-identified] Home  Current DME Used/Available at Home: None    Hand dominance: Right    EXAMINATION OF PERFORMANCE DEFICITS:  Cognitive/Behavioral Status:  Neurologic State: Alert  Orientation Level: Oriented X4  Cognition: Follows commands; Impaired decision making;Poor safety awareness  Perception: Appears intact  Perseveration: No perseveration noted  Safety/Judgement: Awareness of environment;Decreased awareness of need for safety    Skin: No visible issues    Edema: Mild edema L UE    Hearing: Auditory  Auditory Impairment: None    Vision/Perceptual:    Tracking: Requires cues, head turns, or add eye shifts to track (to L)                 Diplopia: No    Acuity: Impaired near vision    Corrective Lenses: Reading glasses (not here at the hospital)    Range of Motion:  AROM: Generally decreased, functional                         Strength:  Strength: Generally decreased, functional                Coordination:     Fine Motor Skills-Upper: Left Impaired;Right Intact    Gross Motor Skills-Upper: Left Impaired;Right Intact    Tone & Sensation:  Tone: Normal  Sensation: Impaired                      Balance:  Sitting: Intact  Standing: Impaired  Standing - Static: Fair  Standing - Dynamic : Fair;Poor;Constant support    Functional Mobility and Transfers for ADLs:  Bed Mobility:  Supine to Sit: Contact guard assistance  Scooting: Contact guard assistance    Transfers:  Sit to Stand: Contact guard assistance;Minimum assistance  Stand to Sit: Contact guard assistance  Bed to Chair: Contact guard assistance;Minimum assistance  Toilet Transfer : Minimum assistance  Assistive Device : Walker, rolling    ADL Assessment:  Feeding: Setup    Oral Facial Hygiene/Grooming: Minimum assistance    Bathing: Minimum assistance    Upper Body Dressing: Supervision    Lower Body Dressing:  Moderate assistance    Toileting: Minimum assistance                ADL Intervention and task modifications:  Patient instructed and indicated understanding the benefits of maintaining activity tolerance, functional mobility, and independence with self care tasks during acute stay  to ensure safe return home and to baseline. Encouraged patient to increase frequency and duration OOB, be out of bed for all meals, perform daily ADLs (as approved by RN/MD regarding bathing etc), and performing functional mobility to/from bathroom (with assistance). Pt educated on safe transfer techniques, with specific emphasis on proper hand placement to push up from seated surface rather than attempt to pull self up, fully positioning self in-front of desired seated location, feeling chair on back of legs and reaching back with 1-2 UE to slowly lower self to seated position. He requires cues throughout for safety. Patient does have some difficulty with grasping the handle of the RW on the left, but does better with cues. Emphasized the importance of obtaining clearance from MD prior to driving. Cognitive Retraining  Safety/Judgement: Awareness of environment;Decreased awareness of need for safety    Functional Measure:  Fugl-Moya Assessment of Motor Recovery after Stroke:   Reflex Activity  Flexors/Biceps/Fingers: Can be elicited  Extensors/Triceps: Can be elicited  Reflex Subtotal: 4    Volitional Movement Within Synergies  Shoulder Retraction: Full  Shoulder Elevation: Full  Shoulder Abduction (90 degrees): Partial  Shoulder External Rotation: Full  Elbow Flexion: Full  Forearm Supination: Full  Shoulder Adduction/Internal Rotation: Full  Elbow Extension: Full  Forearm Pronation: Full  Subtotal: 17    Volitional Movement Mixing Synergies  Hand to Lumbar Spine: Full  Shoulder Flexion (0-90 degrees): Full  Pronation-Supination: Full  Subtotal: 6    Volitional Movement With Little or No Synergy  Shoulder Abduction (0-90 degrees): Partial  Shoulder Flexion ( degrees): Partial  Pronation/Supination: Full  Subtotal : 4    Normal Reflex Activity  Biceps, Triceps, Finger Flexors:  Full  Subtotal : 2    Upper Extremity Total   Upper Extremity Total: 33    Wrist  Stability at 15 Degree Dorsiflexion: Full  Repeated Dorsiflexion/ Volar Flexion: Full  Stability at 15 Degree Dorsiflexion: Partial  Repeated Dorsiflexion/ Volar Flexion: Partial  Circumduction: Partial  Wrist Total: 7    Hand  Mass Flexion: Partial  Mass Extension: Full  Grasp A: Partial  Grasp B: Partial  Grasp C: None  Grasp D: Partial  Grasp E: Partial  Hand Total: 7    Coordination/Speed  Tremor: None  Dysmetria: Marked  Time: >5s  Coordination/Speed Total : 2    Total A-D  Total A-D (Motor Function): 49/66     This is a reliable/valid measure of arm function after a neurological event. It has established value to characterize functional status and for measuring spontaneous and therapy-induced recovery; tests proximal and distal motor functions. Fugl-Moya Assessment - UE scores recorded between five and 30 days post neurologic event can be used to predict UE recovery at six months post neurologic event. Severe = 0-21 points   Moderately Severe = 22-33 points   Moderate = 34-47 points   Mild = 48-66 points  KRISTEN Chavis, DYAN Chin, & DEBORAH Clay (1992). Measurement of motor recovery after stroke: Outcome assessment and sample size requirements.  Stroke, 23, pp. 2537-3868.   ------------------------------------------------------------------------------------------------------------------------------------------------------------------  MCID:  Stroke:   Soren Jewell et al, 2001; n = 171; mean age 79 (5) years; assessed within 16 (12) days of stroke, Acute Stroke)  FMA Motor Scores from Admission to Discharge   10 point increase in FMA Upper Extremity = 1.5 change in discharge FIM   10 point increase in FMA Lower Extremity = 1.9 change in discharge FIM  MDC:   Stroke:   Neelima Fritter et al, 2008, n = 14, mean age = 59.9 (14.6) years, assessed on average 14 (6.5) months post stroke, Chronic Stroke)   FMA = 5.2 points for the Upper Extremity portion of the assessment     Occupational Therapy Evaluation Charge Determination   History Examination Decision-Making   LOW Complexity : Brief history review  HIGH Complexity : 5 or more performance deficits relating to physical, cognitive , or psychosocial skils that result in activity limitations and / or participation restrictions LOW Complexity : No comorbidities that affect functional and no verbal or physical assistance needed to complete eval tasks       Based on the above components, the patient evaluation is determined to be of the following complexity level: LOW   Pain Rating:  No complaints    Activity Tolerance:   Fair    After treatment patient left in no apparent distress: With transport in w/c to go for testing. COMMUNICATION/EDUCATION:   The patients plan of care was discussed with: Physical therapist and Registered nurse. Patient understands intent and goals of therapy, but is neutral about his/her participation. This patients plan of care is appropriate for delegation to [facility].     Thank you for this referral.  Kathy Fabian OTR/JIL  Time Calculation: 16 mins

## 2022-02-09 NOTE — H&P
Hospitalist Admission Note    NAME:  Mauri Valadez   :   1965   MRN:   [id number]     Date of admit: 2022    PCP: Heron Lorenzo MD    Assessment/Plan:     CVA (cerebral vascular accident) ([facility] [address] (2016) POA  History of diabetes, vertigo and previous stroke   Denies taking aspirin currently  Presented with left arm and leg weakness when he awoke this am   Some baseline left weakness    Clinic notes from 2022 describe pt having to drag his left leg   [place] it felt worse this am  Interestingly had an MRI  Brain as outpatient 1/10/2022    Small area of diffusion signal abnormality and enhancement left caudate head    most consistent with subacute infarction new since 2021. Admit head CT with No acute intracranial process identified  Admit CTA head and neck prelim with no large vessel occlusion   Stroke risk factors: DM, tobacco, elevated cholesterol, family history, brother had a stroke  Admit to neuro tele, monitor for atrial fib  Start ASA for now. MRI of brain   Echo TTE  Check HgBa1c and lipid panel  Continue statin  Permissive hypertension, PRN labetalol if marked HTN  Dysphagia screen before PO intake, speech consult  We discussed importance of risk factor modification to prevent further strokes:   Control DM and HTN, statin if LDL elevated, no smoking  Neurology consult in AM  PT/OT consults    Diabetes mellitus type 2 POA  Home regimen: insulin 70/30 due to cost, oral meds  Diabetic diet  Lantus 16 units at bedside  Hold PO meds, kt metformin with IV contrast  Sliding scale insulin  POC  Check HgBa1C    Hyperlipidemia POA  Continue statin  PRN labetalol or hydralazine  Not currently on BP meds at home  Check lipid panel    Tobacco use POA  1 PPD  Counseled on importance of quitting  Nicotine patch PRN    Obesity POA Body mass index is 38.37 kg/m².     Given the patient's current clinical presentation, I have a high level of concern for decompensation if discharged from the emergency department. My assessment of this patient's clinical condition and my plan of care is as noted above. DVT prophylaxis with Lovenox    Code status:Full code  NOK:    History     CHIEF COMPLAINT: \"My left side felt weaker this Am when I woke up\"    HISTORY OF PRESENT ILLNESS:    55 Y. O. female  History of non compliance per clinic notes, has periodically stopped all meds in past   Last admit [facility] for dizziness, r/o CVA, left AMA before work up completed  History of diabetes, vertigo and previous stroke   Denies taking aspirin currently  Presented with left arm and leg weakness when he awoke this AM   Some baseline left weakness    Clinic notes from Jan 2022 describe pt having to drag his left leg   Said it felt worse this am, definite change   Some trouble walking, fell at work  Interestingly had an MRI  Brain as outpatient 1/10/2022    Mild patchy chronic WM signal abnormalities in the supratentorial brain and kim. Small chronic infarctions bilateral corona radiata   Small area of diffusion signal abnormality and enhancement left caudate head    most consistent with subacute infarction new since August 2021. Came into ED    ED  4/5 left weakness in arm and leg  Admit head CT with No acute intracranial process identified  Admit CTA head and neck prelim with no large vessel occlusion   EKG with NSR  We were called to admit the patient      Past Medical History:   Diagnosis Date    Abscess     Diabetes ([address]     Hypercholesterolemia     Sleep apnea     Vertigo         No past surgical history on file.     Social History     Tobacco Use    Smoking status: Current Every Day Smoker     Packs/day: 1.00    Smokeless tobacco: Never Used   Substance Use Topics    Alcohol use: Yes     Comment: holidays, several timses a yr        Family History   Problem Relation Age of Onset    Diabetes Mother     Alcohol abuse Father     No Known Problems Sister     Diabetes Brother     Stroke Brother     No Known Problems Brother     No Known Problems Brother     No Known Problems Sister         No Known Allergies     Prior to Admission medications    Medication Sig Start Date End Date Taking? Authorizing Provider   insulin nph-regular human rec (HUMULIN 70-30) 100 unit/mL (70-30) inpn 12 Units by SubCUTAneous route two (2) times daily (with meals). 1/3/22   Chadwick Freitas MD   SITagliptin-metFORMIN (Janumet) 50-1,000 mg per tablet Take 1 Tablet by mouth two (2) times daily (with meals). 1/3/22   Chadwick Freitas MD   atorvastatin (LIPITOR) 40 mg tablet Take 1 Tablet by mouth daily. 12/22/21   Chadwick Freitas MD   glipiZIDE (GLUCOTROL) 10 mg tablet Take 1 Tablet by mouth two (2) times a day. 12/22/21   Chadwick Freitas MD   meclizine (ANTIVERT) 25 mg tablet Take 1 Tablet by mouth three (3) times daily as needed for Dizziness. Patient not taking: Reported on 1/3/2022 8/18/21   Zabrina Dunbar MD   meclizine (ANTIVERT) 25 mg tablet Take 1 tab TID X 2 days then TID PRN  Patient not taking: Reported on 1/3/2022 8/10/21   Etienne Alicea MD   Insulin Needles, Disposable, 31 gauge x 5/16\" ndle Use once/day  Patient not taking: Reported on 1/3/2022 12/30/20   Anthony ROBERTS MD   Insulin Needles, Disposable, (Esther Pen Needle) 32 gauge x 5/32\" ndle 1 Units by SubCUTAneous route daily. Patient not taking: Reported on 1/3/2022 9/30/20   Asia Freitas MD   lancets [facility] DELICA LANCETS) 30 gauge misc Check blood sugar in AM ( fasting ) and 2 hours after dinner. Patient not taking: Reported on 1/3/2022 11/13/19   Asia Freitas MD   Blood-Glucose Meter [facility] ULTRA2 METER) monitoring kit Check blood sugar in AM ( fasting ) and 2 hours after dinner. 11/13/19   Chadwick Freitas MD   glucose blood VI test strips (ONETOUCH ULTRA TEST) strip Check blood sugar in AM ( fasting ) and 2 hours after dinner.   Patient not taking: Reported on 1/3/2022 11/13/19   Marcos Gupta MD       Review of symptoms:     POSITIVE= Bold  Negative = not bold  General:  fever, chills, sweats  Eyes:    blurred vision, eye pain, double vision  ENT:    [place], sore throat, trouble swallowing  Respiratory:   cough, sputum, SOB  Cardiology:   chest pain, orthopnea, PND, edema  Gastrointestinal:  abdominal pain , N/V, diarrhea, constipation, melena or BRBPR  Genitourinary:  Urgency, dysuria, hematuria  Muskuloskeletal :  Joint redness, swelling or acute joint pain, myalgias  Hematology:  easy bruising, nose or gum bleeding  Dermatological: rash, ulceration  Endocrine:   Polyuria or polydipsia, heat or hold intolerance  Neurological:  Headache, Left weakness     Speech difficulties, memory loss  Psychological: depression, agitation      Objective:   VITALS:    Patient Vitals for the past 24 hrs:   Temp Pulse Resp BP SpO2   22 1547 98.5 °F (36.9 °C) 68 16 (!) 181/147 98 %     Temp (24hrs), Av.5 °F (36.9 °C), Min:98.5 °F (36.9 °C), Max:98.5 °F (36.9 °C)           Wt Readings from Last 12 Encounters:   22 111.1 kg (245 lb)   22 104.8 kg (231 lb)   21 109.5 kg (241 lb 6.5 oz)   21 105.7 kg (233 lb)   08/10/21 113.4 kg (250 lb)   20 112.4 kg (247 lb 12.8 oz)   20 115.2 kg (254 lb)   20 109.5 kg (241 lb 6.4 oz)   19 108.6 kg (239 lb 6.4 oz)   19 104.3 kg (230 lb)   18 112.5 kg (248 lb)   18 113.4 kg (250 lb)         PHYSICAL EXAM:     I had a face to face encounter and independently examined this patient on 22  as outlined below:    General:    Alert, cooperative in no distress     HEENT: Normocephalic, atraumatic    PERRL,  Sclera no icterus    Nasal mucosa without masses or discharge  Hearing intact to voice    Oropharynx without erythema or exudate   Neck:  No meningismus, trachea midline, no carotid bruits     Thyroid not enlarged, no nodules or tenderness  Lungs:   Clear to auscultation bilaterally. No wheezing or rales    No accessory muscle use or retractions.   Heart:   Regular rate and rhythm,  no murmur or gallop. No LE edema  Abdomen:   Soft, non-tender. Not distended. Bowel sounds normal.     No masses, No Hepatosplenomegaly, No Rebound or guarding  Lymph nodes: No cervical or inguinal MICHELLE  Musculoskeletal:  No Joint swelling, erythema, warmth. No Cyanosis or clubbing  Skin:      No rashes     Not Jaundiced   No nodules or thickening  Neurologic: Alert and oriented X 3, follows commands     Cranial nerves 2 to 12 intact    4/5 left motor strength       LAB DATA REVIEWED:    Recent Results (from the past 12 hour(s))   CBC WITH AUTOMATED DIFF    Collection Time: 02/08/22  4:41 PM   Result Value Ref Range    WBC 7.6 4.1 - 11.1 K/uL    RBC 4.56 4.10 - 5.70 M/uL    HGB 14.0 12.1 - 17.0 g/dL    HCT 42.4 36.6 - 50.3 %    MCV 93.0 80.0 - 99.0 FL    MCH 30.7 26.0 - 34.0 PG    MCHC 33.0 30.0 - 36.5 g/dL    RDW 12.5 11.5 - 14.5 %    PLATELET 201 285 - 365 K/uL    MPV 11.1 8.9 - 12.9 FL    NRBC 0.0 0  WBC    ABSOLUTE NRBC 0.00 0.00 - 0.01 K/uL    NEUTROPHILS 48 32 - 75 %    LYMPHOCYTES 41 12 - 49 %    MONOCYTES 8 5 - 13 %    EOSINOPHILS 2 0 - 7 %    BASOPHILS 1 0 - 1 %    IMMATURE GRANULOCYTES 0 0.0 - 0.5 %    ABS. NEUTROPHILS 3.6 1.8 - 8.0 K/UL    ABS. LYMPHOCYTES 3.2 0.8 - 3.5 K/UL    ABS. MONOCYTES 0.6 0.0 - 1.0 K/UL    ABS. EOSINOPHILS 0.2 0.0 - 0.4 K/UL    ABS. BASOPHILS 0.1 0.0 - 0.1 K/UL    ABS. IMM.  GRANS. 0.0 0.00 - 0.04 K/UL    DF AUTOMATED     METABOLIC PANEL, COMPREHENSIVE    Collection Time: 02/08/22  4:41 PM   Result Value Ref Range    Sodium 138 136 - 145 mmol/L    Potassium 4.2 3.5 - 5.1 mmol/L    Chloride 104 97 - 108 mmol/L    CO2 31 21 - 32 mmol/L    Anion gap 3 (L) 5 - 15 mmol/L    Glucose 209 (H) 65 - 100 mg/dL    BUN 10 6 - 20 MG/DL    Creatinine 0.89 0.70 - 1.30 MG/DL    BUN/Creatinine ratio 11 (L) 12 - 20      GFR est AA >60 >60 ml/min/1.73m2    GFR est non-AA >60 >60 ml/min/1.73m2    Calcium 9.1 8.5 - 10.1 MG/DL    Bilirubin, total 0.3 0.2 - 1.0 MG/DL    ALT (SGPT) 20 12 - 78 U/L    AST (SGOT) 10 (L) 15 - 37 U/L    Alk. phosphatase 80 45 - 117 U/L    Protein, total 7.5 6.4 - 8.2 g/dL    Albumin 3.7 3.5 - 5.0 g/dL    Globulin 3.8 2.0 - 4.0 g/dL    A-G Ratio 1.0 (L) 1.1 - 2.2     PROTHROMBIN TIME + INR    Collection Time: 02/08/22  4:41 PM   Result Value Ref Range    INR 1.0 0.9 - 1.1      Prothrombin time 10.4 9.0 - 11.1 sec   EKG, 12 LEAD, INITIAL    Collection Time: 02/08/22  7:03 PM   Result Value Ref Range    Ventricular Rate 87 BPM    Atrial Rate 87 BPM    P-R Interval 204 ms    QRS Duration 72 ms    Q-T Interval 360 ms    QTC Calculation (Bezet) 433 ms    Calculated P Axis 59 degrees    Calculated R Axis 5 degrees    Calculated T Axis -25 degrees    Diagnosis       Sinus rhythm with occasional premature ventricular complexes  Low voltage QRS  Nonspecific T wave abnormality  When compared with ECG of 18-AUG-2021 12:11,  premature ventricular complexes are now present  Nonspecific T wave abnormality, worse in Anterolateral leads           CT Results  (Last 48 hours)               02/08/22 1824  CT HEAD WO CONT Final result    Impression:  No acute intracranial process identified       Narrative:  EXAM: CT HEAD WO CONT       INDICATION: L arm and leg weakness       COMPARISON: 1/10/2022. CONTRAST: None. TECHNIQUE: Unenhanced CT of the head was performed using 5 mm images. Brain and   bone windows were generated. Coronal and sagittal reformats. CT dose reduction   was achieved through use of a standardized protocol tailored for this   examination and automatic exposure control for dose modulation. FINDINGS:   The ventricles and sulci are normal in size, shape and configuration. . There is   no significant white matter disease. There is no intracranial hemorrhage,   extra-axial collection, or mass effect. The basilar cisterns are open. No CT   evidence of acute infarct. The bone windows demonstrate no abnormalities.  The visualized portions of the   paranasal sinuses and mastoid air cells are clear. 02/08/22 1824  CTA HEAD NECK W CONT Preliminary result    Narrative:  PRELIMINARY REPORT       No large vessel occlusion        Preliminary report was provided by Dr. Minh Sen, the on-call radiologist, at [address]   hours       Final report to follow. END PRELIMINARY REPORT                                       CT HEAD WO CONT    Result Date: 2/8/2022  No acute intracranial process identified        I saw the patient personally, took a history and did a complete physical exam at the bedside. I performed complex decision making in coming up with a diagnostic and treatment plan for the patient. I reviewed the patient's past medical records, current laboratory and radiology results, and actual Xray films/EKG. I have also discussed this case with the involved ED physician.     Care Plan discussed with:    Patient, ED Doc    Risk of deterioration:  High    Total Time Coordinating Admission:   65  minutes    Total Critical Care Time:         Cherelle Clements MD

## 2022-02-09 NOTE — CONSULTS
Date of Consultation:  February 9, 2022    Referring Physician: Roya Brennan MD     Reason for Consultation:  Stroke     Chief Complaint   Patient presents with    Extremity Weakness     Left arm weakness this morning at 0530 when pt awoke. Arm remained weak, then pt had a fall at 1330. last known well 2000       History of Present Illness:   Ham Price is a 55 y.o. male with a history of uncontrolled diabetes, hypertension and hyperlipidemia who is currently admitted to the hospital left-sided weakness. Patient reports that since August 2021 he has had some left-sided weakness however yesterday while at work like this worsened. After lunch he reports that he was unable to bear weight and had a fall. He was brought to the hospital for further work-up. Here he underwent stroke work-up including a CTA head and neck and an MRI of the brain that revealed a right lacunar infarction. He was admitted for further work-up. Of note patient does report that he had symptoms of a stroke in August 2021. He was admitted at that time however left AMA. He returned as his left-sided weakness and numbness did not improve and had an MRI of the brain which was negative. He was discharged to rehab. As his symptoms did not improve he had a follow-up MRI in January 2022 which showed subacute left basal ganglia stroke. He does not appear he had any symptoms of this previously. Past Medical History:   Diagnosis Date    Abscess     Diabetes ([address]     Hypercholesterolemia     Sleep apnea     Vertigo         History reviewed. No pertinent surgical history.      Family History   Problem Relation Age of Onset    Diabetes Mother     Alcohol abuse Father     No Known Problems Sister     Diabetes Brother     Stroke Brother     No Known Problems Brother     No Known Problems Brother     No Known Problems Sister         Social History     Tobacco Use    Smoking status: Current Every Day Smoker     Packs/day: 1.00    Smokeless tobacco: Never Used   Substance Use Topics    Alcohol use: Yes     Comment: holidays, several timses a yr        No Known Allergies     Prior to Admission medications    Medication Sig Start Date End Date Taking? Authorizing Provider   insulin nph-regular human rec (HUMULIN 70-30) 100 unit/mL (70-30) inpn 12 Units by SubCUTAneous route two (2) times daily (with meals). 1/3/22   Chadwick Freitas MD   SITagliptin-metFORMIN (Janumet) 50-1,000 mg per tablet Take 1 Tablet by mouth two (2) times daily (with meals). 1/3/22   Chadwick Freitas MD   atorvastatin (LIPITOR) 40 mg tablet Take 1 Tablet by mouth daily. 12/22/21   Chadwick Freitas MD   glipiZIDE (GLUCOTROL) 10 mg tablet Take 1 Tablet by mouth two (2) times a day. 12/22/21   Chadwick Freitas MD   meclizine (ANTIVERT) 25 mg tablet Take 1 Tablet by mouth three (3) times daily as needed for Dizziness. Patient not taking: Reported on 1/3/2022 8/18/21   Jan Pagan MD   meclizine (ANTIVERT) 25 mg tablet Take 1 tab TID X 2 days then TID PRN  Patient not taking: Reported on 1/3/2022 8/10/21   Tremaine Anna MD   Insulin Needles, Disposable, 31 gauge x 5/16\" ndle Use once/day  Patient not taking: Reported on 1/3/2022 12/30/20   Donny ROBERTS MD   Insulin Needles, Disposable, (Esther Pen Needle) 32 gauge x 5/32\" ndle 1 Units by SubCUTAneous route daily. Patient not taking: Reported on 1/3/2022 9/30/20   Susan Freitas MD   lancets [facility] DELICA LANCETS) 30 gauge misc Check blood sugar in AM ( fasting ) and 2 hours after dinner. Patient not taking: Reported on 1/3/2022 11/13/19   Susan Freitas MD   Blood-Glucose Meter [facility] ULTRA2 METER) monitoring kit Check blood sugar in AM ( fasting ) and 2 hours after dinner. 11/13/19   Chadwick Freitas MD   glucose blood VI test strips (ONETOUCH ULTRA TEST) strip Check blood sugar in AM ( fasting ) and 2 hours after dinner.   Patient not taking: Reported on 1/3/2022 11/13/19   Rodolfo May MD       Review of Systems:    General, constitutional: negative  Eyes, vision: negative  Ears, nose, throat: negative  Cardiovascular, heart: negative  Respiratory: negative  Gastrointestinal: negative  Genitourinary: negative  Musculoskeletal: negative  Skin and integumentary: negative  Psychiatric: negative  Endocrine: negative  Neurological: negative, except for HPI  Hematologic/lymphatic: negative  Allergy/immunology: negative    PHYSICAL EXAMINATION:   Visit Vitals  BP (!) 173/97   Pulse 72   Temp 97.5 °F (36.4 °C)   Resp 18   Ht 5' 7\" (1.702 m)   Wt 244 lb 14.9 oz (111.1 kg)   SpO2 97%   BMI 38.36 kg/m²       Physical Exam:  General:  no acute distress  Neck: no carotid bruits  Lungs: clear to auscultation  Heart:  no murmurs, regular rate and rhythm   Lower extremity: no edema    Neurological exam:  Mental Status: Awake, alert, oriented to person, place and time  Attention and Concentration: able to state the days of the week backwards   Speech and Language: No dysarthria. Able to name, repeat and follow commands   Fund of knowledge was preserved    Cranial nerves: II-XII  Pupils equal and reactive, visual fields intact by confrontation   Extraocular movements intact, no evidence of nystagmus or ptosis   Facial sensation intact   Facial movements symmetric   Hearing intact to soft rub bilaterally   Shoulder shrug symmetric and strong   Tongue protrusion full and midline without fasciculation or atrophy    Motor:   Normal tone and Bulk   Drift: No evidence of pronator drift     Strength testing:   deltoid triceps biceps Wrist ext. Wrist flex. intrinsics   Right 5 5 5 5 5 5   Left 4+ 4+ 4+ 4 4 4-      Hip flex. Hip ext. Knee ext. Knee flex Dorsi flex Plantar flex   Right  5 5 5 5 5 5   Left  4+ 4+ 4+ 4+ 4 4       Sensory:  Sensation is intact to light touch and pinprick.   There is no extinction    Reflexes:   Biceps Triceps  Brachiorad Patellar Achilles Plantar Hoffmans   Right  2 1 2 1 - Down Neg   Left  2 1 2 1 - Down Neg Cerebellar testing: Finger-nose-finger was intact on the right. He did have some trouble with this on the left. Gait was deferred given the weakness on his left side    LAB DATA REVIEWED:    Lab Results   Component Value Date/Time    Hemoglobin A1c 10.4 (H) 02/09/2022 02:29 AM    Sodium 138 02/09/2022 02:29 AM    Potassium 4.0 02/09/2022 02:29 AM    Chloride 106 02/09/2022 02:29 AM    Glucose 165 (H) 02/09/2022 02:29 AM    BUN 9 02/09/2022 02:29 AM    Creatinine 0.67 (L) 02/09/2022 02:29 AM    Calcium 8.6 02/09/2022 02:29 AM    WBC 7.2 02/09/2022 02:29 AM    HCT 38.8 02/09/2022 02:29 AM    HGB 13.1 02/09/2022 02:29 AM    PLATELET 025 (L) 63/32/6382 02:29 AM    Hemoglobin A1c, External 9.2 01/14/2015 12:00 AM       Stroke workup    MRI Brain  Independent review of the MRI of the brain reveals diffusion restriction in the right periventricular area. His T2 flair sequences does have evidence of small. CTA head  CTA head and neck was independently reviewed and revealed very mild calcifications in the anterior circulation. TTE:   Pending    Stroke labs:    HgBA1c    Lab Results   Component Value Date/Time    Hemoglobin A1c 10.4 (H) 02/09/2022 02:29 AM    Hemoglobin A1c, External 9.2 01/14/2015 12:00 AM       LDL   Lab Results   Component Value Date/Time    LDL, calculated 113.4 (H) 02/09/2022 02:29 AM       EKG: Normal sinus rhythm      Assessment and Plan:   Edilson Purdy is a 55 y.o. male with a history of uncontrolled diabetes, hypertension and hyperlipidemia who is currently admitted to the hospital left-sided weakness found to have a right parietal lacunar infarction etiology of this is likely due to small vessel ischemic disease    Right parietal lacunar stroke: Etiology is likely due to small vessel disease. Of note patient did have a recent subacute infarction a few weeks ago the left basal ganglia.   CTA head and neck does not reveal any significant atherosclerosis  - Recommend maintaining BP goal is less than 140/90 (this is the long term goal)   - would recommend to continue aspirin 81mg daily for secondary stroke prevention. Additionally I would add Plavix 75 mg for a total of 21 days.  - Risk factor modification:  and hemoglobin A1c 10.4%  -It is likely that head strokes are secondary to uncontrolled risk factors however patient may benefit from a 30-day event monitor to rule out A. fib   - if LDL > 70, would start atorvastatin 80mg daily   - please obtain TTE to rule out intracardiac thrombus   - would monitor on telemetry to rule out arrhythmias    - please obtain PT/OT and speech consultations     Hypertension: Uncontrolled  -Would resume home medications in a stepwise fashion for a blood pressure goal of 140/90.  -Patient may need additional medication for optimal titration    Diabetes: Uncontrolled  -Patient will need close follow-up with his endocrinologist to obtain a hemoglobin A1c goal of less than 7    Hyperlipidemia:  -We will increase his atorvastatin to 80 mg daily. We discussed extensively the importance of lifestyle modification including smoking cessation, diet, and incorporating exercise into daily routine. Options for tobacco cessation were discussed with the patient/family members     Thank you for the consult will sign off at this time. Please call with any additional questions. ,  Patient will need to follow-up with neurology in approximately 1 month        Gela Schaefer MD

## 2022-02-09 NOTE — ED NOTES
Assumed care of pt at this time. Bedside and Verbal shift change report given to Elmer Bella RN (oncoming nurse) by Flakito Lucero RN (offgoing nurse). Report included the following information SBAR, Kardex, ED Summary, MAR, Recent Results and Med Rec Status. Pt presents to the ED for left sided weakness. Pt has hx of strokes, TIA in the past. Last stroke was in August which left him with left sided deficits/weakness. Pt has weakness at baseline, but pt states he is more weak on that side than normal. Pt speech is clear and sensation is normal. Pt states he felt fine before going to bed at 9 pm last night and woke up with more weakness and went to work.

## 2022-02-09 NOTE — PROGRESS NOTES
Problem: Mobility Impaired (Adult and Pediatric)  Goal: *Acute Goals and Plan of Care (Insert Text)  Description: FUNCTIONAL STATUS PRIOR TO ADMISSION: Patient was independent and active without use of DME. Works full-time as a . Hx of L LE and UE weakness since Aug of last year but it has not limited his ability to perform ambulation, ADLs, or job. HOME SUPPORT PRIOR TO ADMISSION: The patient lived with spouse but did not require assist.    Physical Therapy Goals  Initiated 2/9/2022  1. Patient will move from supine to sit and sit to supine , scoot up and down, and roll side to side in bed with independence within 7 day(s). 2.  Patient will transfer from bed to chair and chair to bed with modified independence using the least restrictive device within 7 day(s). 3.  Patient will perform sit to stand with modified independence within 7 day(s). 4.  Patient will ambulate with modified independence for 300 feet with the least restrictive device within 7 day(s). 5.  Patient will ascend/descend 10 stairs with single handrail(s) with supervision/set-up within 7 day(s). 6.  Patient will improve Joiner Balance score by 7 points within 7 days. Outcome: Not Met   PHYSICAL THERAPY EVALUATION- NEURO POPULATION  Patient: Malika Mario (22 y.o. male)  Date: 2/9/2022  Primary Diagnosis: CVA (cerebral vascular accident) [facility]) [I63.9]        Precautions:  Fall    ASSESSMENT  Based on the objective data described below, the patient presents with LUE and LLE weakness, impaired L sided coordination, impulsiveness, decreased insight into deficits, impaired standing balance, high fall risk, impaired gait mechanics, and decreased overall independence following admission for acute R parietal infarct. Patient with little attention to task and impulsivily mobilizes through room requiring max education for safety. Completes bed mobility with CGA, transfers Min A, and ambulation Min A x25ft RW.  Initial ambulation without assistive device with significant unsteadiness, patient nearly dragging LLE on ground. Safety and balance improving with walker, ambulates with step to gait pattern and limited weight shifting to LLE. Patient is well below baseline functional status and will require continued skilled PT services to address functional impairments and decrease overall fall risk. Recommend rehab at discharge, patient would benefit from intensive therapy 3hr/day, 5 days per week. Patient educated on L sided deficits and benefits of continued therapy. Current Level of Function Impacting Discharge (mobility/balance): Min A    Functional Outcome Measure: The patient scored Total: 7/56 on the [facility] Assessment which is indicative of high fall risk. Other factors to consider for discharge: poor insight into deficits/poor understanding of condition     Patient will benefit from skilled therapy intervention to address the above noted impairments. PLAN :  Recommendations and Planned Interventions: bed mobility training, transfer training, gait training, therapeutic exercises, neuromuscular re-education, patient and family training/education and therapeutic activities      Frequency/Duration: Patient will be followed by physical therapy:  5 times a week to address goals. Recommendation for discharge: (in order for the patient to meet his/her long term goals)  Therapy 3 hours per day 5-7 days per week    This discharge recommendation:  Has been made in collaboration with the attending provider and/or case management    IF patient discharges home will need the following DME: rolling walker     SUBJECTIVE:   Patient stated My left side just doesn't work, it's how it will be. There is nothing that can be done about it.     OBJECTIVE DATA SUMMARY:   HISTORY:    Past Medical History:   Diagnosis Date    Abscess     Diabetes ([address]     Hypercholesterolemia     Sleep apnea     Vertigo    History reviewed.  No pertinent surgical history. Personal factors and/or comorbidities impacting plan of care: DM, HTN, prior CVA    Home Situation  Home Environment: Rehabilitation facility  # Steps to Enter: 4  One/Two Story Residence: Two story  # of Interior Steps: 15  Height of Each Step (in): 7 inches  Interior Rails: Both  Lift Chair Available: No  Living Alone: No  Support Systems: Spouse/Significant Other  Patient Expects to be Discharged to[de-identified] Home  Current DME Used/Available at Home: None    EXAMINATION/PRESENTATION/DECISION MAKING:   Critical Behavior:  Neurologic State: Alert  Orientation Level: Oriented X4  Cognition: Follows commands,Impaired decision making,Poor safety awareness  Safety/Judgement: Awareness of environment,Decreased awareness of need for safety  Hearing: Auditory  Auditory Impairment: None    Range Of Motion:  AROM: Generally decreased, functional  Strength:    Strength: Generally decreased, functional  Tone & Sensation:   Tone: Normal  Sensation: Impaired    Vision:   Tracking: Requires cues, head turns, or add eye shifts to track (to L)  Diplopia: No  Acuity: Impaired near vision  Corrective Lenses: Reading glasses (not here at the hospital)  Functional Mobility:  Bed Mobility:  Supine to Sit: Contact guard assistance  Scooting: Contact guard assistance  Transfers:  Sit to Stand: Contact guard assistance;Minimum assistance  Stand to Sit: Contact guard assistance  Bed to Chair: Contact guard assistance;Minimum assistance  Balance:   Sitting: Intact  Standing: Impaired  Standing - Static: Fair  Standing - Dynamic : Fair;Poor;Constant support  Ambulation/Gait Training:  Distance (ft): 25 Feet (ft)  Assistive Device: Walker, rolling;Gait belt  Ambulation - Level of Assistance: Minimal assistance  Gait Abnormalities: Ataxic;Decreased step clearance; Step to gait  Base of Support: Widened  Speed/Eileen: Pace decreased (<100 feet/min); Accelerated  Step Length: Right shortened  Swing Pattern: Left asymmetrical    Functional Measure  Joiner Balance Test:    Sitting to Standin  Standing Unsupported: 0  Sitting with Back Unsupported: 4  Standing to Sittin  Transfers: 1  Standing Unsupported with Eyes Closed: 0  Standing Unsupported with Feet Together: 0  Reach Forward with Outstretched Arm: 0   Object: 0  Turn to Look Over Shoulders: 0  Turn 360 Degrees: 0  Alternate Foot on Step/Stool: 0  Standing Unsupported One Foot in Front: 0  Stand on One Le  Total:          56=Maximum possible score;   0-20=High fall risk  21-40=Moderate fall risk   41-56=Low fall risk        Physical Therapy Evaluation Charge Determination   History Examination Presentation Decision-Making   MEDIUM  Complexity : 1-2 comorbidities / personal factors will impact the outcome/ POC  MEDIUM Complexity : 3 Standardized tests and measures addressing body structure, function, activity limitation and / or participation in recreation  LOW Complexity : Stable, uncomplicated  Other outcome measures Joiner Balance   HIGH       Based on the above components, the patient evaluation is determined to be of the following complexity level: LOW     Pain Rating:  Denies pain    Activity Tolerance:   Fair, SpO2 stable on RA, requires rest breaks and observed SOB with activity      After treatment patient left in no apparent distress:   Sitting in chair, Call bell within reach and with transportation    COMMUNICATION/EDUCATION:   The patients plan of care was discussed with: Occupational therapist, Registered nurse and Case management. Patient was educated regarding his deficit(s) of LUE and LLE as this relates to his diagnosis of CVA. He demonstrated Fair understanding as evidenced by need for further education . Fall prevention education was provided and the patient/caregiver indicated understanding., Patient/family have participated as able in goal setting and plan of care. and Patient/family agree to work toward stated goals and plan of care.     Thank you for this referral.  Sirisha Davila, PT   Time Calculation: 16 mins

## 2022-02-10 VITALS
HEIGHT: 67 IN | TEMPERATURE: 98 F | DIASTOLIC BLOOD PRESSURE: 98 MMHG | BODY MASS INDEX: 38.44 KG/M2 | HEART RATE: 68 BPM | RESPIRATION RATE: 18 BRPM | SYSTOLIC BLOOD PRESSURE: 155 MMHG | OXYGEN SATURATION: 97 % | WEIGHT: 244.93 LBS

## 2022-02-10 LAB
GLUCOSE BLD STRIP.AUTO-MCNC: 163 MG/DL (ref 65–117)
SERVICE CMNT-IMP: ABNORMAL

## 2022-02-10 PROCEDURE — 74011250637 HC RX REV CODE- 250/637: Performed by: INTERNAL MEDICINE

## 2022-02-10 PROCEDURE — 74011250636 HC RX REV CODE- 250/636: Performed by: INTERNAL MEDICINE

## 2022-02-10 PROCEDURE — 74011250637 HC RX REV CODE- 250/637: Performed by: PSYCHIATRY & NEUROLOGY

## 2022-02-10 PROCEDURE — 74011636637 HC RX REV CODE- 636/637: Performed by: INTERNAL MEDICINE

## 2022-02-10 PROCEDURE — 82962 GLUCOSE BLOOD TEST: CPT

## 2022-02-10 RX ORDER — IBUPROFEN 200 MG
1 TABLET ORAL
Qty: 30 PATCH | Refills: 0 | Status: SHIPPED | OUTPATIENT
Start: 2022-02-10 | End: 2022-03-12

## 2022-02-10 RX ORDER — ATORVASTATIN CALCIUM 80 MG/1
80 TABLET, FILM COATED ORAL DAILY
Qty: 30 TABLET | Refills: 2 | Status: SHIPPED | OUTPATIENT
Start: 2022-02-11 | End: 2022-05-12

## 2022-02-10 RX ORDER — AMLODIPINE BESYLATE 5 MG/1
10 TABLET ORAL DAILY
Status: DISCONTINUED | OUTPATIENT
Start: 2022-02-11 | End: 2022-02-10 | Stop reason: HOSPADM

## 2022-02-10 RX ORDER — GUAIFENESIN 100 MG/5ML
81 LIQUID (ML) ORAL DAILY
Qty: 30 TABLET | Refills: 2 | Status: SHIPPED | OUTPATIENT
Start: 2022-02-11 | End: 2022-05-12

## 2022-02-10 RX ORDER — AMLODIPINE BESYLATE 10 MG/1
10 TABLET ORAL DAILY
Qty: 30 TABLET | Refills: 2 | Status: SHIPPED | OUTPATIENT
Start: 2022-02-11 | End: 2022-05-12

## 2022-02-10 RX ADMIN — ASPIRIN 81 MG: 81 TABLET, CHEWABLE ORAL at 08:30

## 2022-02-10 RX ADMIN — ATORVASTATIN CALCIUM 80 MG: 40 TABLET, FILM COATED ORAL at 08:30

## 2022-02-10 RX ADMIN — AMLODIPINE BESYLATE 5 MG: 5 TABLET ORAL at 08:30

## 2022-02-10 RX ADMIN — Medication 2 UNITS: at 08:31

## 2022-02-10 RX ADMIN — ENOXAPARIN SODIUM 40 MG: 100 INJECTION SUBCUTANEOUS at 08:30

## 2022-02-10 NOTE — PROGRESS NOTES
Attempted to schedule hospital follow up PCP appointment. Patient has an existing  appointment with PCP on 2/14/22 at . [address]. Pending patient discharge.  Vito Monahan, Care Management Specialist

## 2022-02-10 NOTE — PROGRESS NOTES
Problem: Falls - Risk of  Goal: *Absence of Falls  Description: Document Ruma Embs Fall Risk and appropriate interventions in the flowsheet.   Outcome: Progressing Towards Goal  Note: Fall Risk Interventions:  Mobility Interventions: Bed/chair exit alarm         Medication Interventions: Bed/chair exit alarm         History of Falls Interventions: Bed/chair exit alarm         Problem: Patient Education: Go to Patient Education Activity  Goal: Patient/Family Education  Outcome: Progressing Towards Goal     Problem: Patient Education: Go to Patient Education Activity  Goal: Patient/Family Education  Outcome: Progressing Towards Goal     Problem: TIA/CVA Stroke: 0-24 hours  Goal: Off Pathway (Use only if patient is Off Pathway)  Outcome: Progressing Towards Goal  Goal: Activity/Safety  Outcome: Progressing Towards Goal  Goal: Consults, if ordered  Outcome: Progressing Towards Goal  Goal: Diagnostic Test/Procedures  Outcome: Progressing Towards Goal  Goal: Nutrition/Diet  Outcome: Progressing Towards Goal  Goal: Discharge Planning  Outcome: Progressing Towards Goal  Goal: Medications  Outcome: Progressing Towards Goal  Goal: Respiratory  Outcome: Progressing Towards Goal  Goal: Treatments/Interventions/Procedures  Outcome: Progressing Towards Goal  Goal: Minimize risk of bleeding post-thrombolytic infusion  Outcome: Progressing Towards Goal  Goal: Monitor for complications post-thrombolytic infusion  Outcome: Progressing Towards Goal  Goal: Psychosocial  Outcome: Progressing Towards Goal  Goal: *Hemodynamically stable  Outcome: Progressing Towards Goal  Goal: *Neurologically stable  Description: Absence of additional neurological deficits    Outcome: Progressing Towards Goal  Goal: *Verbalizes anxiety and depression are reduced or absent  Outcome: Progressing Towards Goal  Goal: *Absence of Signs of Aspiration on Current Diet  Outcome: Progressing Towards Goal  Goal: *Absence of deep venous thrombosis signs and symptoms(Stroke Metric)  Outcome: Progressing Towards Goal  Goal: *Ability to perform ADLs and demonstrates progressive mobility and function  Outcome: Progressing Towards Goal  Goal: *Stroke education started(Stroke Metric)  Outcome: Progressing Towards Goal  Goal: *Dysphagia screen performed(Stroke Metric)  Outcome: Progressing Towards Goal  Goal: *Rehab consulted(Stroke Metric)  Outcome: Progressing Towards Goal     Problem: TIA/CVA Stroke: Day 2 Until Discharge  Goal: Off Pathway (Use only if patient is Off Pathway)  Outcome: Progressing Towards Goal  Goal: Activity/Safety  Outcome: Progressing Towards Goal  Goal: Diagnostic Test/Procedures  Outcome: Progressing Towards Goal  Goal: Nutrition/Diet  Outcome: Progressing Towards Goal  Goal: Discharge Planning  Outcome: Progressing Towards Goal  Goal: Medications  Outcome: Progressing Towards Goal  Goal: Respiratory  Outcome: Progressing Towards Goal  Goal: Treatments/Interventions/Procedures  Outcome: Progressing Towards Goal  Goal: Psychosocial  Outcome: Progressing Towards Goal  Goal: *Verbalizes anxiety and depression are reduced or absent  Outcome: Progressing Towards Goal  Goal: *Absence of aspiration  Outcome: Progressing Towards Goal  Goal: *Absence of deep venous thrombosis signs and symptoms(Stroke Metric)  Outcome: Progressing Towards Goal  Goal: *Optimal pain control at patient's stated goal  Outcome: Progressing Towards Goal  Goal: *Tolerating diet  Outcome: Progressing Towards Goal  Goal: *Ability to perform ADLs and demonstrates progressive mobility and function  Outcome: Progressing Towards Goal  Goal: *Stroke education continued(Stroke Metric)  Outcome: Progressing Towards Goal     Problem: Ischemic Stroke: Discharge Outcomes  Goal: *Verbalizes anxiety and depression are reduced or absent  Outcome: Progressing Towards Goal  Goal: *Verbalize understanding of risk factor modification(Stroke Metric)  Outcome: Progressing Towards Goal  Goal: *Hemodynamically stable  Outcome: Progressing Towards Goal  Goal: *Absence of aspiration pneumonia  Outcome: Progressing Towards Goal  Goal: *Aware of needed dietary changes  Outcome: Progressing Towards Goal  Goal: *Verbalize understanding of prescribed medications including anti-coagulants, anti-lipid, and/or anti-platelets(Stroke Metric)  Outcome: Progressing Towards Goal  Goal: *Tolerating diet  Outcome: Progressing Towards Goal  Goal: *Aware of follow-up diagnostics related to anticoagulants  Outcome: Progressing Towards Goal  Goal: *Ability to perform ADLs and demonstrates progressive mobility and function  Outcome: Progressing Towards Goal  Goal: *Absence of DVT(Stroke Metric)  Outcome: Progressing Towards Goal  Goal: *Absence of aspiration  Outcome: Progressing Towards Goal  Goal: *Optimal pain control at patient's stated goal  Outcome: Progressing Towards Goal  Goal: *Home safety concerns addressed  Outcome: Progressing Towards Goal  Goal: *Describes available resources and support systems  Outcome: Progressing Towards Goal  Goal: *Verbalizes understanding of activation of EMS(911) for stroke symptoms(Stroke Metric)  Outcome: Progressing Towards Goal  Goal: *Understands and describes signs and symptoms to report to providers(Stroke Metric)  Outcome: Progressing Towards Goal  Goal: *Neurolgocially stable (absence of additional neurological deficits)  Outcome: Progressing Towards Goal  Goal: *Verbalizes importance of follow-up with primary care physician(Stroke Metric)  Outcome: Progressing Towards Goal  Goal: *Smoking cessation discussed,if applicable(Stroke Metric)  Outcome: Progressing Towards Goal  Goal: *Depression screening completed(Stroke Metric)  Outcome: Progressing Towards Goal     Problem: Diabetes Self-Management  Goal: *Disease process and treatment process  Description: Define diabetes and identify own type of diabetes; list 3 options for treating diabetes.   Outcome: Progressing Towards Goal  Goal: *Incorporating nutritional management into lifestyle  Description: Describe effect of type, amount and timing of food on blood glucose; list 3 methods for planning meals. Outcome: Progressing Towards Goal  Goal: *Incorporating physical activity into lifestyle  Description: State effect of exercise on blood glucose levels. Outcome: Progressing Towards Goal  Goal: *Developing strategies to promote health/change behavior  Description: Define the ABC's of diabetes; identify appropriate screenings, schedule and personal plan for screenings. Outcome: Progressing Towards Goal  Goal: *Using medications safely  Description: State effect of diabetes medications on diabetes; name diabetes medication taking, action and side effects. Outcome: Progressing Towards Goal  Goal: *Monitoring blood glucose, interpreting and using results  Description: Identify recommended blood glucose targets  and personal targets. Outcome: Progressing Towards Goal  Goal: *Prevention, detection, treatment of acute complications  Description: List symptoms of hyper- and hypoglycemia; describe how to treat low blood sugar and actions for lowering  high blood glucose level. Outcome: Progressing Towards Goal  Goal: *Prevention, detection and treatment of chronic complications  Description: Define the natural course of diabetes and describe the relationship of blood glucose levels to long term complications of diabetes.   Outcome: Progressing Towards Goal  Goal: *Developing strategies to address psychosocial issues  Description: Describe feelings about living with diabetes; identify support needed and support network  Outcome: Progressing Towards Goal  Goal: *Insulin pump training  Outcome: Progressing Towards Goal  Goal: *Sick day guidelines  Outcome: Progressing Towards Goal  Goal: *Patient Specific Goal (EDIT GOAL, INSERT TEXT)  Outcome: Progressing Towards Goal     Problem: Patient Education: Go to Patient Education Activity  Goal: Patient/Family Education  Outcome: Progressing Towards Goal

## 2022-02-10 NOTE — DISCHARGE INSTRUCTIONS
DISCHARGE DIAGNOSIS:  CVA (cerebral vascular accident) ([facility] [address] : R parietal infarct   Elevated blood pressure  Diabetes mellitus type 2 POA, uncontrolled  HbA1c 10.4  Hyperlipidemia POA  Tobacco use POA    MEDICATIONS:  · It is important that you take the medication exactly as they are prescribed. · Keep your medication in the bottles provided by the pharmacist and keep a list of the medication names, dosages, and times to be taken in your wallet. · Do not take other medications without consulting your doctor. Pain Management: per above medications    What to do at Home    Recommended diet:  Cardiac Diet and diabetic diet     Recommended activity: Activity as tolerated    If you have questions regarding the hospital related prescriptions or hospital related issues please call [address] [place] [address] at . You can always direct your questions to your primary care doctor if you are unable to reach your hospital physician; your PCP works as an extension of your hospital doctor just like your hospital doctor is an extension of your PCP for your time at the hospital [facility]). If you experience any of the following symptoms then please call your primary care physician or return to the emergency room if you cannot get hold of your doctor:  Fever, chills, nausea, vomiting, diarrhea, change in mentation, falling, bleeding, shortness of breath  Patient Education        DASH Diet: Care Instructions  Your Care Instructions     The DASH diet is an eating plan that can help lower your blood pressure. DASH stands for Dietary Approaches to Stop Hypertension. Hypertension is high blood pressure. The DASH diet focuses on eating foods that are high in calcium, potassium, and magnesium. These nutrients can lower blood pressure. The foods that are highest in these nutrients are fruits, vegetables, low-fat dairy products, nuts, seeds, and legumes.  But taking calcium, potassium, and magnesium supplements instead of eating foods that are high in those nutrients does not have the same effect. The DASH diet also includes whole grains, fish, and poultry. The DASH diet is one of several lifestyle changes your doctor may recommend to lower your high blood pressure. Your doctor may also want you to decrease the amount of sodium in your diet. Lowering sodium while following the DASH diet can lower blood pressure even further than just the DASH diet alone. Follow-up care is a key part of your treatment and safety. Be sure to make and go to all appointments, and call your doctor if you are having problems. It's also a good idea to know your test results and keep a list of the medicines you take. How can you care for yourself at home? Following the DASH diet  · Eat 4 to 5 servings of fruit each day. A serving is 1 medium-sized piece of fruit, ½ cup chopped or canned fruit, 1/4 cup dried fruit, or 4 ounces (½ cup) of fruit juice. Choose fruit more often than fruit juice. · Eat 4 to 5 servings of vegetables each day. A serving is 1 cup of lettuce or raw leafy vegetables, ½ cup of chopped or cooked vegetables, or 4 ounces (½ cup) of vegetable juice. Choose vegetables more often than vegetable juice. · Get 2 to 3 servings of low-fat and fat-free dairy each day. A serving is 8 ounces of milk, 1 cup of yogurt, or 1 ½ ounces of cheese. · Eat 6 to 8 servings of grains each day. A serving is 1 slice of bread, 1 ounce of dry cereal, or ½ cup of cooked rice, pasta, or cooked cereal. Try to choose whole-grain products as much as possible. · Limit lean meat, poultry, and fish to 2 servings each day. A serving is 3 ounces, about the size of a deck of cards. · Eat 4 to 5 servings of nuts, seeds, and legumes (cooked dried beans, lentils, and split peas) each week. A serving is 1/3 cup of nuts, 2 tablespoons of seeds, or ½ cup of cooked beans or peas. · Limit fats and oils to 2 to 3 servings each day.  A serving is 1 teaspoon of vegetable oil or 2 tablespoons of salad dressing. · Limit sweets and added sugars to 5 servings or less a week. A serving is 1 tablespoon jelly or jam, ½ cup sorbet, or 1 cup of lemonade. · Eat less than 2,300 milligrams (mg) of sodium a day. If you limit your sodium to 1,500 mg a day, you can lower your blood pressure even more. · Be aware that all of these are the suggested number of servings for people who eat 1,800 to 2,000 calories a day. Your recommended number of servings may be different if you need more or fewer calories. Tips for success  · Start small. Do not try to make dramatic changes to your diet all at once. You might feel that you are missing out on your favorite foods and then be more likely to not follow the plan. Make small changes, and stick with them. Once those changes become habit, add a few more changes. · Try some of the following:  ? Make it a goal to eat a fruit or vegetable at every meal and at snacks. This will make it easy to get the recommended amount of fruits and vegetables each day. ? Try yogurt topped with fruit and nuts for a snack or healthy dessert. ? Add lettuce, tomato, cucumber, and onion to sandwiches. ? Combine a ready-made pizza crust with low-fat mozzarella cheese and lots of vegetable toppings. Try using tomatoes, squash, spinach, broccoli, carrots, cauliflower, and onions. ? Have a variety of cut-up vegetables with a low-fat dip as an appetizer instead of chips and dip. ? Sprinkle sunflower seeds or chopped almonds over salads. Or try adding chopped walnuts or almonds to cooked vegetables. ? Try some vegetarian meals using beans and peas. Add garbanzo or kidney beans to salads. Make burritos and tacos with mashed lazo beans or black beans. Where can you learn more? Go to http://www.nuñez.com/  Enter H967 in the search box to learn more about \"DASH Diet: Care Instructions. \"  Current as of: April 29, 2021               Content Version: 13.0  © 5437-9180 [employer]. Care instructions adapted under license by [employer] (which disclaims liability or warranty for this information). If you have questions about a medical condition or this instruction, always ask your healthcare professional. [place] [address] any warranty or liability for your use of this information.

## 2022-02-10 NOTE — PROGRESS NOTES
Physical Therapy Note    Chart reviewed. Noted patient with impending discharge. Patient received in room getting dressed to prepare for discharge with wife assisting. Attempted to see patient for PT intervention, however patient adamantly refusing as he wanted to get ready for discharge. Patient and wife without questions or concerns regarding plan for discharge. PT has recommended IPR at discharge, however patient has declined and is planning to return home with family assist and OPPT at this time. Recommend RW for discharge. Will defer.     Thank you,  Ninoska Short, PT, DPT

## 2022-02-10 NOTE — PROGRESS NOTES
End of Shift Note     Bedside shift change report given to Glenys Hill RN  (oncoming nurse) by Shahrzad Franco RN (offgoing nurse). Report included the following information SBAR, Kardex, ED Summary and MAR     Shift worked:  nights   Shift summary and any significant changes:      no significant changes,        Concerns for physician to address:  none   Zone phone for oncoming shift:   [phone]      Patient Information  Eusebio Quarles  55 y.o.  2/8/2022  5:37 PM by Lore Castellano MD. Eusebio Quarles was admitted from Home     Problem List       Patient Active Problem List     Diagnosis Date Noted    CVA (cerebral vascular accident) ([facility] 08/16/2021    Dizziness 08/10/2021    Obesity, morbid ([address] 05/01/2018    Non compliance w medication regimen 09/30/2016    Essential hypertension 02/26/2016    Hyperlipidemia with target LDL less than 70 09/28/2015    Type 2 diabetes mellitus without complication ([address] 72/43/0437    Abscess of bursa of left knee 06/10/2013    Cellulitis 08/21/2012           Past Medical History:   Diagnosis Date    Abscess      Diabetes ([address]      Hypercholesterolemia      Sleep apnea      Vertigo           Core Measures:  CVA: Yes Yes  CHF:No Not applicable  PNA:No Not applicable     Activity:  Activity Level: Up with Assistance  Number times ambulated in hallways past shift: 0  Number of times OOB to chair past shift: 0     Cardiac:   Cardiac Monitoring: Yes         Access:   Current line(s): PIV   Central Line? No Placement date   PICC LINE? No Placement date      Genitourinary:   Urinary status: voiding  Urinary Catheter?  No Placement Date      Respiratory:   Chronic home O2 use?: NO  Incentive spirometer at bedside: N/A     GI:  Last Bowel Movement Date: 01/09/22  Current diet:  DIET NPO  Passing flatus: YES  Tolerating current diet: YES     Pain Management:   Patient states pain is manageable on current regimen: YES     Skin:  Armando Score: 20  Interventions: speciality bed    Patient Safety:  Fall Score: Total Score: 2  Interventions: bed/chair alarm, assistive device (walker, cane, etc), gripper socks, pt to call before getting OOB and stay with me (per policy)  @Rollbelt  @dexterity to release roll belt  Yes/No ( must document dexterity  here by stating Yes or No here, otherwise this is a restraint and must follow restraint documentation policy.)     DVT prophylaxis:  DVT prophylaxis Med- Yes  DVT prophylaxis SCD or ADAL- No      Wounds: (If Applicable)  Wounds- Yes  Location Upper back, abscess that is oozing.  Present on admission.       Active Consults:  IP CONSULT TO HOSPITALIST  IP CONSULT TO NEUROLOGY     Length of Stay:  Expected LOS: - - -  Actual LOS: 1  Discharge Plan: Yes Home         Brsisa Ewing RN

## 2022-02-10 NOTE — PROGRESS NOTES
Discahrge instructions reviewed with patient and wife including follow up appointments ,new medications,changes to medications and diet and smoking cessation. Patient verbalized understanding.  Discharged by volunteer via wheelchair

## 2022-02-10 NOTE — PROGRESS NOTES
Transition of Care Plan:     RUR: 7%  Disposition: home with spouse  >OP PT prescription provided   Follow up appointments: PCP, specialists as indicated   DME needed: RW- family to purchase after d/c   Transportation at Discharge: spouse will transport home, at bedside    [employer] or means to access home: yes  IM Medicare Letter: N/A, has [employer]   Is patient a BCPI-A Bundle: No                  If yes, was Bundle Letter given?:    Is patient a  and connected with the [place]? No            If yes, was [place] transfer form completed and [facility] notified? Caregiver Contact: Sarah Pineda (spouse) [phone]  Discharge Caregiver contacted prior to discharge? Yes, at bedside   Care Conference needed?: No    Chart reviewed. CM aware of discharge order. Met with pt and spouse at bedside this AM to finalize and review d/c plan. Pt has opted to utilize OP therapy after discharge. He declined HHC and/or IPR placement. OP PT prescription provided prior to d/c. He plans to set up his own appointment and will return to same OP therapy center he has used in the past. Spouse is transporting pt home today and is present at bedside. PCP appointment previously scheduled for 2/14 @ 8:15AM. Pt/family to purchase own RW after d/c if desired. No further CM needs identified at this time. Ready for d/c from CM standpoint. Care Management Interventions  PCP Verified by CM: Yes  Palliative Care Criteria Met (RRAT>21 & CHF Dx)?: No  Mode of Transport at Discharge:  Other (see comment) (spouse)  Transition of Care Consult (CM Consult): Discharge Planning  Discharge Durable Medical Equipment: No  Physical Therapy Consult: Yes  Occupational Therapy Consult: Yes  Speech Therapy Consult: Yes  Support Systems: Spouse/Significant Other,Other Family Member(s)  Confirm Follow Up Transport: Family  The Plan for Transition of Care is Related to the Following Treatment Goals : OP therapy  The Patient and/or Patient Representative was Provided with a Choice of Provider and Agrees with the Discharge Plan?: Yes  Name of the Patient Representative Who was Provided with a Choice of Provider and Agrees with the Discharge Plan: spouse  Freedom of Choice List was Provided with Basic Dialogue that Supports the Patient's Individualized Plan of Care/Goals, Treatment Preferences and Shares the Quality Data Associated with the Providers?: No   Resource Information Provided?: No  Discharge Location  Patient Expects to be Discharged to[de-identified] Home with outpatient services    [address] [place], [address], [place] [facility]  [phone]

## 2022-02-10 NOTE — PROGRESS NOTES
Chart reviewed. Noted patient with impending discharge. Patient received in room getting dressed to prepare for discharge with wife assisting. Attempted to see patient for OT intervention, however patient adamantly refusing as he wanted to get ready for discharge. Patient and wife without questions or concerns regarding plan for discharge. OT has recommended IPR at discharge, however patient has declined and is planning to return home with family assist and OPPT at this time. Recommend RW for discharge.  Will defer DISCHARGE

## 2022-02-10 NOTE — DISCHARGE SUMMARY
Hospitalist Discharge Summary     Patient ID:  Kirsten Phipps  [id number]  55 y.o.  1965 2/8/2022    PCP on record: David Cortez MD    Admit date: 2/8/2022  Discharge date and time: 2/10/2022    DISCHARGE DIAGNOSIS:  CVA (cerebral vascular accident) [facility]) : R parietal infarct   Elevated blood pressure  Diabetes mellitus type 2 POA, uncontrolled  HbA1c 10.4  Hyperlipidemia POA  Tobacco use POA    CONSULTATIONS:  IP CONSULT TO HOSPITALIST  IP CONSULT TO NEUROLOGY    Excerpted HPI from H&P of Ellen Espino MD:  CHIEF COMPLAINT: \"My left side felt weaker this Am when I woke up\"     HISTORY OF PRESENT ILLNESS:     55 Y. O. female  History of non compliance per clinic notes, has periodically stopped all meds in past              Last admit [facility] for dizziness, r/o CVA, left AMA before work up completed  History of diabetes, vertigo and previous stroke   Denies taking aspirin currently  Presented with left arm and leg weakness when he awoke this AM              Some baseline left weakness                          Clinic notes from Jan 2022 describe pt having to drag his left leg              Said it felt worse this am, definite change              Some trouble walking, fell at work  Interestingly had an MRI  Brain as outpatient 1/10/2022               Mild patchy chronic WM signal abnormalities in the supratentorial brain and kim. Small chronic infarctions bilateral corona radiata              Small area of diffusion signal abnormality and enhancement left caudate head                          most consistent with subacute infarction new since August 2021.   Came into ED     ED  4/5 left weakness in arm and leg  Admit head CT with No acute intracranial process identified  Admit CTA head and neck prelim with no large vessel occlusion   EKG with NSR  We were called to admit the patient    ______________________________________________________________________  DISCHARGE SUMMARY/HOSPITAL COURSE:  for full details see H&P, daily progress notes, labs, consult notes. CVA (cerebral vascular accident) [facility]) : R parietal infarct   Elevated blood pressure  History of diabetes, vertigo and previous stroke   Denies taking aspirin currently  Presented with left arm and leg weakness when he awoke this am              Some baseline left weakness                          Clinic notes from Jan 2022 describe pt having to drag his left leg              Said it felt worse this am  Interestingly had an MRI  Brain as outpatient 1/10/2022               Small area of diffusion signal abnormality and enhancement left caudate head                          most consistent with subacute infarction new since August 2021. Admit head CT with No acute intracranial process identified  Admit CTA head and neck prelim with no large vessel occlusion   Stroke risk factors: DM, tobacco, elevated cholesterol, family history, brother had a stroke  MRI of the brain showed right parietal infarct  2D echo  Showed EF 50-55 % no PFO    .4. Continue with baby aspirin and high-dose statin  HbA1c 10.4     Diabetes mellitus type 2 POA, uncontrolled  Home regimen: insulin 70/30 due to cost, oral meds  Diabetic diet  We will continue with Lantus and sliding scale insulin while inpatient  Resume home dose NPH   HbA1c 10.4  Hyperlipidemia POA  Continue statin  PRN labetalol  Not currently on BP meds at home, started on Norvasc as BP elevated  Counseled about compliance         Tobacco use POA  1 PPD  Counseled on importance of quitting  Nicotine patch PRN  Updated wife at bedside        _______________________________________________________________________  Patient seen and examined by me on discharge day. Pertinent Findings:  Gen:    Not in distress  Chest: Clear lungs  CVS:   Regular rhythm.   No edema  Abd:  Soft, not distended, not tender  Neuro:  Alert, [id number]  _______________________________________________________________________  DISCHARGE MEDICATIONS:   Current Discharge Medication List      START taking these medications    Details   amLODIPine (NORVASC) 10 mg tablet Take 1 Tablet by mouth daily for 90 days. Qty: 30 Tablet, Refills: 2  Start date: 2/11/2022, End date: 5/12/2022      aspirin 81 mg chewable tablet Take 1 Tablet by mouth daily for 90 days. Qty: 30 Tablet, Refills: 2  Start date: 2/11/2022, End date: 5/12/2022      nicotine (NICODERM CQ) 14 mg/24 hr patch 1 Patch by TransDERmal route daily as needed for PRN Reason (Other) (nicotine withdrawal) for up to 30 days. Qty: 30 Patch, Refills: 0  Start date: 2/10/2022, End date: 3/12/2022      [facility], Need PT/OT for 5 times a week  bed mobility training, transfer training, gait training, therapeutic exercises, neuromuscular re-education, patient and family training/education and therapeutic activities  Qty: 1 Each, Refills: 0  Start date: 2/10/2022         CONTINUE these medications which have CHANGED    Details   atorvastatin (LIPITOR) 80 mg tablet Take 1 Tablet by mouth daily for 90 days. Qty: 30 Tablet, Refills: 2  Start date: 2/11/2022, End date: 5/12/2022      insulin nph-regular human rec (HUMULIN 70-30) 100 unit/mL (70-30) inpn 15 Units by SubCUTAneous route two (2) times daily (with meals). Qty: 18 Adjustable Dose Pre-filled Pen Syringe, Refills: 1  Start date: 2/10/2022    Associated Diagnoses: Type 2 diabetes mellitus without complication, with long-term current use of insulin ([address] Non compliance w medication regimen         CONTINUE these medications which have NOT CHANGED    Details   SITagliptin-metFORMIN (Janumet) 50-1,000 mg per tablet Take 1 Tablet by mouth two (2) times daily (with meals).   Qty: 60 Tablet, Refills: 2    Associated Diagnoses: Type 2 diabetes mellitus without complication, with long-term current use of insulin (H[facility])      glipiZIDE (GLUCOTROL) 10 mg tablet Take 1 Tablet by mouth two (2) times a day. Qty: 60 Tablet, Refills: 0    Comments: Must need appointment  Associated Diagnoses: Type 2 diabetes mellitus without complication, with long-term current use of insulin ([facility]      ! ! meclizine (ANTIVERT) 25 mg tablet Take 1 Tablet by mouth three (3) times daily as needed for Dizziness. Qty: 20 Tablet, Refills: 0      !! meclizine (ANTIVERT) 25 mg tablet Take 1 tab TID X 2 days then TID PRN  Qty: 30 Tablet, Refills: 0      Insulin Needles, Disposable, 31 gauge x 5/16\" ndle Use once/day  Qty: 1 Package, Refills: 11    Associated Diagnoses: Type 2 diabetes mellitus without complication, with long-term current use of insulin ([facility])      Insulin Needles, Disposable, (Esther Pen Needle) 32 gauge x 5/32\" ndle 1 Units by SubCUTAneous route daily. Qty: 3 Pen Needle, Refills: 6    Associated Diagnoses: Type 2 diabetes mellitus without complication, with long-term current use of insulin ([facility])      lancets (ONETOUCH DELICA LANCETS) 30 gauge misc Check blood sugar in AM ( fasting ) and 2 hours after dinner. Qty: 100 Lancet, Refills: 11      Blood-Glucose Meter (ONETOUCH ULTRA2 METER) monitoring kit Check blood sugar in AM ( fasting ) and 2 hours after dinner. Qty: 1 Kit, Refills: 0      glucose blood VI test strips (ONETOUCH ULTRA TEST) strip Check blood sugar in AM ( fasting ) and 2 hours after dinner. Qty: 100 Strip, Refills: 11       !! - Potential duplicate medications found. Please discuss with provider. Patient Follow Up Instructions:    Activity: Activity as tolerated  Diet: Cardiac Diet  Wound Care: None needed        Follow-up Information     Follow up With Specialties Details Why Contact Info    Jeancarlos Donnelly MD Family Medicine   [address] [place] [address]  [phone]          ________________________________________________________________    Risk of deterioration: Moderate    Condition at Discharge: Stable  __________________________________________________________________    Disposition  home with outpatient therapy     ____________________________________________________________________    Code Status: Full Code  ___________________________________________________________________      Total time in minutes spent coordinating this discharge (includes going over instructions, follow-up, prescriptions, and preparing report for sign off to her PCP) :  >30 minutes    Signed:  Rosalind Mata MD

## 2022-02-14 ENCOUNTER — OFFICE VISIT (OUTPATIENT)
Dept: PRIMARY CARE CLINIC | Age: 57
End: 2022-02-14
Payer: COMMERCIAL

## 2022-02-14 VITALS
TEMPERATURE: 97.6 F | HEART RATE: 88 BPM | RESPIRATION RATE: 18 BRPM | OXYGEN SATURATION: 97 % | WEIGHT: 231 LBS | HEIGHT: 67 IN | SYSTOLIC BLOOD PRESSURE: 113 MMHG | DIASTOLIC BLOOD PRESSURE: 74 MMHG | BODY MASS INDEX: 36.26 KG/M2

## 2022-02-14 DIAGNOSIS — Z79.4 TYPE 2 DIABETES MELLITUS WITHOUT COMPLICATION, WITH LONG-TERM CURRENT USE OF INSULIN (HCC): ICD-10-CM

## 2022-02-14 DIAGNOSIS — Z09 HOSPITAL DISCHARGE FOLLOW-UP: ICD-10-CM

## 2022-02-14 DIAGNOSIS — E11.9 TYPE 2 DIABETES MELLITUS WITHOUT COMPLICATION, WITH LONG-TERM CURRENT USE OF INSULIN (HCC): ICD-10-CM

## 2022-02-14 DIAGNOSIS — Z91.14 NON COMPLIANCE W MEDICATION REGIMEN: ICD-10-CM

## 2022-02-14 DIAGNOSIS — I10 ESSENTIAL HYPERTENSION, BENIGN: ICD-10-CM

## 2022-02-14 DIAGNOSIS — Z86.73 HISTORY OF CVA (CEREBROVASCULAR ACCIDENT): Primary | ICD-10-CM

## 2022-02-14 DIAGNOSIS — E78.5 HYPERLIPIDEMIA WITH TARGET LDL LESS THAN 70: ICD-10-CM

## 2022-02-14 DIAGNOSIS — R53.1 LEFT-SIDED WEAKNESS: ICD-10-CM

## 2022-02-14 LAB — GLUCOSE POC: 198 MG/DL

## 2022-02-14 PROCEDURE — 99214 OFFICE O/P EST MOD 30 MIN: CPT | Performed by: FAMILY MEDICINE

## 2022-02-14 PROCEDURE — 82962 GLUCOSE BLOOD TEST: CPT | Performed by: FAMILY MEDICINE

## 2022-02-14 PROCEDURE — 1111F DSCHRG MED/CURRENT MED MERGE: CPT | Performed by: FAMILY MEDICINE

## 2022-02-14 RX ORDER — GLIPIZIDE 10 MG/1
10 TABLET ORAL 2 TIMES DAILY
Qty: 180 TABLET | Refills: 0 | Status: SHIPPED | OUTPATIENT
Start: 2022-02-14 | End: 2022-06-15 | Stop reason: SDUPTHER

## 2022-02-14 NOTE — PROGRESS NOTES
Transitional Care Management Progress Note    Patient: Mauri Valadez  : 1965  PCP: [place] MD    Date of office visit: 2022   Date of admission: 22  Date of discharge: 2/10/22  Hospital: [facility]    Call initiated w/i 2 business dates of discharge: *No response documented in the last 14 days   Date of the most recent call to the patient: *No documented post hospital discharge outreach found in the last 14 days      Assessment/Plan:     Diagnoses and all orders for this visit:    1. History of CVA (cerebrovascular accident)  -     REFERRAL TO NEUROLOGY- new referral provided. -     [address] [place] [address]    2. Hospital discharge follow-up  -     [place] DISCHARGE MEDS RECONCILED W/ CURRENT OUTPATIENT MED LIST        Start PT/OT  3. Type 2 diabetes mellitus without complication, with long-term current use of insulin (HCC)  -     AMB POC GLUCOSE BLOOD, BY GLUCOSE MONITORING DEVICE  -    Restart  glipiZIDE (GLUCOTROL) 10 mg tablet; Take 1 Tablet by mouth two (2) times a day. Continue Insulin and Janumet. 4. Non compliance w medication regimen      Counseling provided. 5. Hyperlipidemia with target LDL less than 70     Continue Lipitor  6. Left-sided weakness  -     REFERRAL TO HOME HEALTH    7. Essential hypertension, benign      BP well controlled. Consider adding ACE/ARB    Follow-up and Dispositions    · Return in about 2 months (around 2022), or if symptoms worsen or fail to improve, for diabetes, cholesterol. Subjective:   Mauri Valadez is a 55 y.o. male presenting today for follow-up after hospital discharge. This encounter and supporting documentation was reviewed if available. Medication reconciliation was performed today. The main problem requiring admission was left sided week ness.    Complications during admission: none      Mauri Valadez is a 55 y.o. male with a history of uncontrolled diabetes, non compliance with treatment , hypertension and hyperlipidemia who was taken to [facility] ER with a c/o  left-sided weakness. MRI showed Acute deep white matter right parietal infarct. He was advised to take Aspirin 81 mg daily by Neurologist.    Insulin was increased to 15 units BID and continued Janumet. He is not taking Glipizide as prescribed. FBS has been running <126 per patient. Lipitor was increased to 80 mg.    BP found to elevated during his stay so he was started on Amlodipine 10 mg. BP well controlled. Interval history/Current status: left upper extremity very week. Has been using walker. He did not start PT/OT yet. Admitting symptoms have: not changed      Medications marked \"taking\" at this time:  Prior to Admission medications    Medication Sig Start Date End Date Taking? Authorizing Provider   atorvastatin (LIPITOR) 80 mg tablet Take 1 Tablet by mouth daily for 90 days. 2/11/22 5/12/22 Yes Katelynn Brasher MD   amLODIPine (NORVASC) 10 mg tablet Take 1 Tablet by mouth daily for 90 days. 2/11/22 5/12/22 Yes Katelynn Brasher MD   aspirin 81 mg chewable tablet Take 1 Tablet by mouth daily for 90 days. 2/11/22 5/12/22 Yes Katelynn Brasher MD   insulin nph-regular human rec (HUMULIN 70-30) 100 unit/mL (70-30) inpn 15 Units by SubCUTAneous route two (2) times daily (with meals). 2/10/22  Yes Katelynn Brasher MD   OTHER,NON-FORMULARY, Need PT/OT for 5 times a week  bed mobility training, transfer training, gait training, therapeutic exercises, neuromuscular re-education, patient and family training/education and therapeutic activities 2/10/22  Yes Katelynn Brasher MD   SITagliptin-metFORMIN (Janumet) 50-1,000 mg per tablet Take 1 Tablet by mouth two (2) times daily (with meals). 1/3/22  Yes Chadwick Freitas MD   Insulin Needles, Disposable, 31 gauge x 5/16\" ndle Use once/day 12/30/20  Yes Chadwick Freitas MD   Insulin Needles, Disposable, (Esther Pen Needle) 32 gauge x 5/32\" ndle 1 Units by SubCUTAneous route daily.  9/30/20  Yes Summer Freitas MD   nicotine (NICODERM CQ) 14 mg/24 hr patch 1 Patch by TransDERmal route daily as needed for PRN Reason (Other) (nicotine withdrawal) for up to 30 days. Patient not taking: Reported on 2/14/2022 2/10/22 3/12/22  Britta Sotelo MD   glipiZIDE (GLUCOTROL) 10 mg tablet Take 1 Tablet by mouth two (2) times a day. Patient not taking: Reported on 2/14/2022 12/22/21   Elza ROBERTS MD   meclizine (ANTIVERT) 25 mg tablet Take 1 Tablet by mouth three (3) times daily as needed for Dizziness. Patient not taking: Reported on 1/3/2022 8/18/21   Timmy Lehman MD   meclizine (ANTIVERT) 25 mg tablet Take 1 tab TID X 2 days then TID PRN  Patient not taking: Reported on 1/3/2022 8/10/21   Madison Licea MD   lancets [facility] DELICA LANCETS) 30 gauge misc Check blood sugar in AM ( fasting ) and 2 hours after dinner. Patient not taking: Reported on 1/3/2022 11/13/19   Mike Freitas MD   Blood-Glucose Meter [facility] ULTRA2 METER) monitoring kit Check blood sugar in AM ( fasting ) and 2 hours after dinner. Patient not taking: Reported on 2/14/2022 11/13/19   Chadwick Freitas MD   glucose blood VI test strips (ONETOUCH ULTRA TEST) strip Check blood sugar in AM ( fasting ) and 2 hours after dinner. Patient not taking: Reported on 1/3/2022 11/13/19   [place] MD        ROS:  Negative except HPI       Patient Active Problem List   Diagnosis Code    Cellulitis L03.90    Abscess of bursa of left knee M71.062    Hyperlipidemia with target LDL less than 70 E78.5    Type 2 diabetes mellitus without complication ([address] Y12.5    Essential hypertension I10    Non compliance w medication regimen Z91.14    Obesity, morbid ([facility] E66.01    Dizziness R42    CVA (cerebral vascular accident) ([facility] I63.9     Current Outpatient Medications   Medication Sig Dispense Refill    glipiZIDE (GLUCOTROL) 10 mg tablet Take 1 Tablet by mouth two (2) times a day.  180 Tablet 0    atorvastatin (LIPITOR) 80 mg tablet Take 1 Tablet by mouth daily for 90 days. 30 Tablet 2    amLODIPine (NORVASC) 10 mg tablet Take 1 Tablet by mouth daily for 90 days. 30 Tablet 2    aspirin 81 mg chewable tablet Take 1 Tablet by mouth daily for 90 days. 30 Tablet 2    insulin nph-regular human rec (HUMULIN 70-30) 100 unit/mL (70-30) inpn 15 Units by SubCUTAneous route two (2) times daily (with meals). 18 Adjustable Dose Pre-filled Pen Syringe 1    OTHER,NON-FORMULARY, Need PT/OT for 5 times a week  bed mobility training, transfer training, gait training, therapeutic exercises, neuromuscular re-education, patient and family training/education and therapeutic activities 1 Each 0    SITagliptin-metFORMIN (Janumet) 50-1,000 mg per tablet Take 1 Tablet by mouth two (2) times daily (with meals). 60 Tablet 2    Insulin Needles, Disposable, 31 gauge x 5/16\" ndle Use once/day 1 Package 11    Insulin Needles, Disposable, (Esther Pen Needle) 32 gauge x 5/32\" ndle 1 Units by SubCUTAneous route daily. 3 Pen Needle 6    nicotine (NICODERM CQ) 14 mg/24 hr patch 1 Patch by TransDERmal route daily as needed for PRN Reason (Other) (nicotine withdrawal) for up to 30 days. (Patient not taking: Reported on 2/14/2022) 30 Patch 0    lancets (ONETOUCH DELICA LANCETS) 30 gauge misc Check blood sugar in AM ( fasting ) and 2 hours after dinner. (Patient not taking: Reported on 1/3/2022) 100 Lancet 11    Blood-Glucose Meter (ONETOUCH ULTRA2 METER) monitoring kit Check blood sugar in AM ( fasting ) and 2 hours after dinner. (Patient not taking: Reported on 2/14/2022) 1 Kit 0    glucose blood VI test strips (ONETOUCH ULTRA TEST) strip Check blood sugar in AM ( fasting ) and 2 hours after dinner.  (Patient not taking: Reported on 1/3/2022) 100 Strip 11     No Known Allergies  Past Medical History:   Diagnosis Date    Abscess     Diabetes ([facility]     Hypercholesterolemia     Sleep apnea     Stroke ([facility]     Vertigo      Family History   Problem Relation Age of Onset    Diabetes Mother     Alcohol abuse Father     No Known Problems Sister     Diabetes Brother     Stroke Brother     No Known Problems Brother     No Known Problems Brother     No Known Problems Sister      Social History     Tobacco Use    Smoking status: Current Every Day Smoker     Packs/day: 1.00    Smokeless tobacco: Never Used   Substance Use Topics    Alcohol use: Yes     Comment: holidays, several timses a yr          Objective:     Visit Vitals  /74 (BP 1 Location: Right arm, BP Patient Position: Sitting, BP Cuff Size: Small infant)   Pulse 88   Temp 97.6 °F (36.4 °C) (Temporal)   Resp 18   Ht 5' 7\" (1.702 m)   Wt 231 lb (104.8 kg)   SpO2 97%   BMI 36.18 kg/m²        Physical Examination: General appearance - alert, well appearing, and in no distress, oriented to person, place, and time and overweight  Mental status - alert, oriented to person, place, and time, normal mood, behavior, speech, dress, motor activity, and thought processes  Mouth - mucous membranes moist, pharynx normal without lesions  Neck - supple, no significant adenopathy  Chest - clear to auscultation, no wheezes, rales or rhonchi, symmetric air entry  Heart - normal rate, regular rhythm, normal S1, S2, no murmurs, rubs, clicks or gallops  Neurological - flaccid paralysis in left upper extermity. 4/5 strength in left lower extermity. foot frop noted. Extremities - no pedal edema noted       We discussed the expected course, resolution and complications of the diagnosis(es) in detail. Medication risks, benefits, costs, interactions, and alternatives were discussed as indicated. I advised him to contact the office if his condition worsens, changes or fails to improve as anticipated. He expressed understanding with the diagnosis(es) and plan. Juana Cornelius MD     Follow-up and Dispositions    · Return in about 2 months (around 4/14/2022), or if symptoms worsen or fail to improve, for diabetes, cholesterol.

## 2022-02-14 NOTE — PROGRESS NOTES
Identified pt with two pt identifiers(name and ). Chief Complaint   Patient presents with   [facility] Follow Up        3 most recent PHQ Screens 1/3/2022   Little interest or pleasure in doing things Not at all   Feeling down, depressed, irritable, or hopeless More than half the days   Total Score PHQ 2 2        There were no vitals filed for this visit. Health Maintenance Due   Topic    Hepatitis C Screening     COVID-19 Vaccine (1)    Pneumococcal 0-64 years (1 of 2 - PPSV23)    Eye Exam Retinal or Dilated     DTaP/Tdap/Td series (1 - Tdap)    Colorectal Cancer Screening Combo     Shingrix Vaccine Age 50> (1 of 2)    Foot Exam Q1        1. Have you been to the ER, urgent care clinic since your last visit? Hospitalized since your last visit? YES- ED 6 weeks ago     2. Have you seen or consulted any other health care providers outside of the [address] [place] [address] since your last visit? Include any pap smears or colon screening.  No

## 2022-02-15 ENCOUNTER — TELEPHONE (OUTPATIENT)
Dept: PRIMARY CARE CLINIC | Age: 57
End: 2022-02-15

## 2022-02-15 NOTE — TELEPHONE ENCOUNTER
Cornelius Miles with [employer] calling, states  order was faxed there in error. She states order should go to [facility] area.     Questions call [phone]

## 2022-02-21 ENCOUNTER — HOME HEALTH ADMISSION (OUTPATIENT)
Dept: HOME HEALTH SERVICES | Facility: HOME HEALTH | Age: 57
End: 2022-02-21
Payer: COMMERCIAL

## 2022-02-23 ENCOUNTER — HOME CARE VISIT (OUTPATIENT)
Dept: SCHEDULING | Facility: HOME HEALTH | Age: 57
End: 2022-02-23
Payer: COMMERCIAL

## 2022-02-23 PROCEDURE — G0151 HHCP-SERV OF PT,EA 15 MIN: HCPCS

## 2022-02-23 PROCEDURE — 400013 HH SOC

## 2022-02-24 ENCOUNTER — HOME CARE VISIT (OUTPATIENT)
Dept: SCHEDULING | Facility: HOME HEALTH | Age: 57
End: 2022-02-24
Payer: COMMERCIAL

## 2022-02-24 VITALS
HEART RATE: 98 BPM | OXYGEN SATURATION: 96 % | RESPIRATION RATE: 18 BRPM | TEMPERATURE: 98.6 F | DIASTOLIC BLOOD PRESSURE: 70 MMHG | SYSTOLIC BLOOD PRESSURE: 118 MMHG

## 2022-02-24 PROCEDURE — G0152 HHCP-SERV OF OT,EA 15 MIN: HCPCS

## 2022-02-25 ENCOUNTER — HOME CARE VISIT (OUTPATIENT)
Dept: SCHEDULING | Facility: HOME HEALTH | Age: 57
End: 2022-02-25
Payer: COMMERCIAL

## 2022-02-25 VITALS
RESPIRATION RATE: 17 BRPM | BODY MASS INDEX: 36.41 KG/M2 | HEART RATE: 82 BPM | SYSTOLIC BLOOD PRESSURE: 128 MMHG | WEIGHT: 232 LBS | OXYGEN SATURATION: 97 % | HEIGHT: 67 IN | TEMPERATURE: 98.1 F | DIASTOLIC BLOOD PRESSURE: 78 MMHG

## 2022-02-25 PROCEDURE — G0151 HHCP-SERV OF PT,EA 15 MIN: HCPCS

## 2022-02-26 VITALS
SYSTOLIC BLOOD PRESSURE: 132 MMHG | TEMPERATURE: 98.2 F | HEART RATE: 82 BPM | DIASTOLIC BLOOD PRESSURE: 80 MMHG | OXYGEN SATURATION: 97 %

## 2022-02-28 ENCOUNTER — HOME CARE VISIT (OUTPATIENT)
Dept: HOME HEALTH SERVICES | Facility: HOME HEALTH | Age: 57
End: 2022-02-28
Payer: COMMERCIAL

## 2022-02-28 VITALS
OXYGEN SATURATION: 96 % | RESPIRATION RATE: 18 BRPM | SYSTOLIC BLOOD PRESSURE: 128 MMHG | DIASTOLIC BLOOD PRESSURE: 82 MMHG | HEART RATE: 74 BPM | TEMPERATURE: 98.7 F

## 2022-02-28 PROCEDURE — G0157 HHC PT ASSISTANT EA 15: HCPCS

## 2022-03-01 ENCOUNTER — HOME CARE VISIT (OUTPATIENT)
Dept: SCHEDULING | Facility: HOME HEALTH | Age: 57
End: 2022-03-01
Payer: COMMERCIAL

## 2022-03-01 VITALS
HEART RATE: 81 BPM | DIASTOLIC BLOOD PRESSURE: 62 MMHG | SYSTOLIC BLOOD PRESSURE: 118 MMHG | OXYGEN SATURATION: 98 % | TEMPERATURE: 96.9 F

## 2022-03-01 PROCEDURE — G0158 HHC OT ASSISTANT EA 15: HCPCS

## 2022-03-03 ENCOUNTER — HOME CARE VISIT (OUTPATIENT)
Dept: SCHEDULING | Facility: HOME HEALTH | Age: 57
End: 2022-03-03
Payer: COMMERCIAL

## 2022-03-03 ENCOUNTER — HOME CARE VISIT (OUTPATIENT)
Dept: HOME HEALTH SERVICES | Facility: HOME HEALTH | Age: 57
End: 2022-03-03
Payer: COMMERCIAL

## 2022-03-03 VITALS
HEART RATE: 71 BPM | TEMPERATURE: 97 F | SYSTOLIC BLOOD PRESSURE: 124 MMHG | DIASTOLIC BLOOD PRESSURE: 74 MMHG | OXYGEN SATURATION: 98 %

## 2022-03-03 PROCEDURE — G0158 HHC OT ASSISTANT EA 15: HCPCS

## 2022-03-03 PROCEDURE — G0157 HHC PT ASSISTANT EA 15: HCPCS

## 2022-03-04 ENCOUNTER — PATIENT MESSAGE (OUTPATIENT)
Dept: PRIMARY CARE CLINIC | Age: 57
End: 2022-03-04

## 2022-03-04 VITALS
RESPIRATION RATE: 18 BRPM | TEMPERATURE: 98.7 F | OXYGEN SATURATION: 96 % | HEART RATE: 97 BPM | SYSTOLIC BLOOD PRESSURE: 136 MMHG | DIASTOLIC BLOOD PRESSURE: 78 MMHG

## 2022-03-07 ENCOUNTER — HOME CARE VISIT (OUTPATIENT)
Dept: SCHEDULING | Facility: HOME HEALTH | Age: 57
End: 2022-03-07
Payer: COMMERCIAL

## 2022-03-07 ENCOUNTER — HOME CARE VISIT (OUTPATIENT)
Dept: HOME HEALTH SERVICES | Facility: HOME HEALTH | Age: 57
End: 2022-03-07
Payer: COMMERCIAL

## 2022-03-07 VITALS
HEART RATE: 95 BPM | SYSTOLIC BLOOD PRESSURE: 135 MMHG | OXYGEN SATURATION: 98 % | RESPIRATION RATE: 16 BRPM | TEMPERATURE: 97 F | DIASTOLIC BLOOD PRESSURE: 80 MMHG

## 2022-03-07 PROCEDURE — G0157 HHC PT ASSISTANT EA 15: HCPCS

## 2022-03-07 PROCEDURE — G0152 HHCP-SERV OF OT,EA 15 MIN: HCPCS

## 2022-03-09 VITALS
OXYGEN SATURATION: 98 % | DIASTOLIC BLOOD PRESSURE: 80 MMHG | HEART RATE: 95 BPM | RESPIRATION RATE: 16 BRPM | SYSTOLIC BLOOD PRESSURE: 135 MMHG | TEMPERATURE: 97 F

## 2022-03-10 ENCOUNTER — HOME CARE VISIT (OUTPATIENT)
Dept: SCHEDULING | Facility: HOME HEALTH | Age: 57
End: 2022-03-10
Payer: COMMERCIAL

## 2022-03-10 ENCOUNTER — TELEPHONE (OUTPATIENT)
Dept: PRIMARY CARE CLINIC | Age: 57
End: 2022-03-10

## 2022-03-10 ENCOUNTER — HOME CARE VISIT (OUTPATIENT)
Dept: HOME HEALTH SERVICES | Facility: HOME HEALTH | Age: 57
End: 2022-03-10
Payer: COMMERCIAL

## 2022-03-10 VITALS — OXYGEN SATURATION: 98 % | RESPIRATION RATE: 18 BRPM | TEMPERATURE: 97.2 F | HEART RATE: 94 BPM

## 2022-03-10 PROCEDURE — G0157 HHC PT ASSISTANT EA 15: HCPCS

## 2022-03-10 PROCEDURE — G0152 HHCP-SERV OF OT,EA 15 MIN: HCPCS

## 2022-03-10 NOTE — TELEPHONE ENCOUNTER
Was able to get Skilled nursing added to pt order they will start next week.      [facility] Per Dr. Stpeh Galvan

## 2022-03-10 NOTE — TELEPHONE ENCOUNTER
Douglas Sage is the  for patient. She says it's very important the patient has a Nursing Evaluation. He wants to quit smoking and really needs someone to monitor and help him. Urgent.      Douglas Sage with [facility]  [phone]  Extension [phone]

## 2022-03-10 NOTE — TELEPHONE ENCOUNTER
Spoke to nikolay she stated she has been working with juan and he could really benefit from having skilled nursing with help checking his sugars and helping him navigate that. She also said patient has script for nicotine patches whish he had no interest in using until now she has been trying to get him to stop smoking. So he could also benefit from someone showing him how to use it. Says wife has no interest in helping and he doesn't have much help.

## 2022-03-14 ENCOUNTER — HOME CARE VISIT (OUTPATIENT)
Dept: HOME HEALTH SERVICES | Facility: HOME HEALTH | Age: 57
End: 2022-03-14
Payer: COMMERCIAL

## 2022-03-14 ENCOUNTER — HOME CARE VISIT (OUTPATIENT)
Dept: SCHEDULING | Facility: HOME HEALTH | Age: 57
End: 2022-03-14
Payer: COMMERCIAL

## 2022-03-14 VITALS
RESPIRATION RATE: 16 BRPM | TEMPERATURE: 98.2 F | OXYGEN SATURATION: 97 % | SYSTOLIC BLOOD PRESSURE: 143 MMHG | DIASTOLIC BLOOD PRESSURE: 80 MMHG | HEART RATE: 86 BPM

## 2022-03-14 VITALS
DIASTOLIC BLOOD PRESSURE: 80 MMHG | SYSTOLIC BLOOD PRESSURE: 112 MMHG | OXYGEN SATURATION: 98 % | HEART RATE: 80 BPM | TEMPERATURE: 98 F

## 2022-03-14 VITALS
OXYGEN SATURATION: 95 % | HEART RATE: 83 BPM | DIASTOLIC BLOOD PRESSURE: 70 MMHG | RESPIRATION RATE: 18 BRPM | TEMPERATURE: 98.2 F | SYSTOLIC BLOOD PRESSURE: 110 MMHG

## 2022-03-14 PROCEDURE — G0157 HHC PT ASSISTANT EA 15: HCPCS

## 2022-03-14 PROCEDURE — G0158 HHC OT ASSISTANT EA 15: HCPCS

## 2022-03-15 ENCOUNTER — HOME CARE VISIT (OUTPATIENT)
Dept: HOME HEALTH SERVICES | Facility: HOME HEALTH | Age: 57
End: 2022-03-15
Payer: COMMERCIAL

## 2022-03-15 NOTE — TELEPHONE ENCOUNTER
Spoke to Eduar and informed I believe new services are starting tomorrow for pt to get skilled nursing

## 2022-03-15 NOTE — TELEPHONE ENCOUNTER
Nurse with [facility] calling to speak with nurse.     Please call her back at [phone]    Ext [phone]

## 2022-03-16 ENCOUNTER — HOME CARE VISIT (OUTPATIENT)
Dept: SCHEDULING | Facility: HOME HEALTH | Age: 57
End: 2022-03-16
Payer: COMMERCIAL

## 2022-03-16 VITALS
TEMPERATURE: 97.3 F | DIASTOLIC BLOOD PRESSURE: 60 MMHG | OXYGEN SATURATION: 95 % | SYSTOLIC BLOOD PRESSURE: 110 MMHG | HEART RATE: 92 BPM | RESPIRATION RATE: 18 BRPM

## 2022-03-16 VITALS
DIASTOLIC BLOOD PRESSURE: 74 MMHG | OXYGEN SATURATION: 98 % | HEART RATE: 79 BPM | TEMPERATURE: 97.1 F | SYSTOLIC BLOOD PRESSURE: 128 MMHG

## 2022-03-16 PROCEDURE — G0158 HHC OT ASSISTANT EA 15: HCPCS

## 2022-03-16 PROCEDURE — G0299 HHS/HOSPICE OF RN EA 15 MIN: HCPCS

## 2022-03-16 NOTE — TELEPHONE ENCOUNTER
Spoke to pt says that as of right now his sister is taking care of paperwork and will call if he needs anything to make a office visit

## 2022-03-16 NOTE — TELEPHONE ENCOUNTER
Anam Wadsworth NP 3/15/2022 11:31 AM EDT    Please schedule AM office visit with patient.   ----- Message -----  From: Merleen Mcardle  Sent: 3/15/2022 8:56 AM EDT  To: Paris Lozano NP  Subject: FW: Disability Paperwork       ----- Message -----  From: Malika Mario  Sent: 3/11/2022 5:34 PM EDT  To: [employer] [facility] [employer] [facility]  Subject: Disability Paperwork     I was seen on 2/14/22 a few days after I was released from the hospital. I can't see a nephrologist until 6/27/22. My notes from the hospital & from your visit isn't enough to fill out the form? I'm currently on a 5 day a week at home rehab schedule so it's not that easy to come out for appointments. Will a call or video visit be acceptable? Please let me know I really need to get this paperwork in for my short term disability.  Thanks

## 2022-03-16 NOTE — CASE COMMUNICATION
SN MICHELET done, pt may need new glucometer; states it isnt working properly, has gotten some \"high\" but doesnt believe its correct, checked sugar while present- WNL, but pt isnt checking daily,  talked about quitting smoking; though pt isnt \"ready\" yet, educated on importance of quitting with history of stroke, will call/ message MD office to see if pt can get new glucometer through insurance, pt has appt in april cant remember when. [facility] SN 2w2, 1w2.

## 2022-03-17 ENCOUNTER — TELEPHONE (OUTPATIENT)
Dept: PRIMARY CARE CLINIC | Age: 57
End: 2022-03-17

## 2022-03-17 ENCOUNTER — HOME CARE VISIT (OUTPATIENT)
Dept: SCHEDULING | Facility: HOME HEALTH | Age: 57
End: 2022-03-17
Payer: COMMERCIAL

## 2022-03-17 VITALS
DIASTOLIC BLOOD PRESSURE: 72 MMHG | SYSTOLIC BLOOD PRESSURE: 128 MMHG | TEMPERATURE: 97.2 F | HEART RATE: 72 BPM | OXYGEN SATURATION: 97 %

## 2022-03-17 PROCEDURE — G0151 HHCP-SERV OF PT,EA 15 MIN: HCPCS

## 2022-03-17 NOTE — TELEPHONE ENCOUNTER
Per call from Jossy with [facility], pt was seen yesterday for a nurses visit. She states that pt need help getting a new glucometer.         Ph [phone]

## 2022-03-18 ENCOUNTER — HOME CARE VISIT (OUTPATIENT)
Dept: HOME HEALTH SERVICES | Facility: HOME HEALTH | Age: 57
End: 2022-03-18
Payer: COMMERCIAL

## 2022-03-18 PROBLEM — R42 DIZZINESS: Status: ACTIVE | Noted: 2021-08-10

## 2022-03-19 PROBLEM — E66.01 OBESITY, MORBID (HCC): Status: ACTIVE | Noted: 2018-05-01

## 2022-03-19 PROBLEM — I63.9 CVA (CEREBRAL VASCULAR ACCIDENT) (HCC): Status: ACTIVE | Noted: 2021-08-16

## 2022-03-21 ENCOUNTER — HOME CARE VISIT (OUTPATIENT)
Dept: SCHEDULING | Facility: HOME HEALTH | Age: 57
End: 2022-03-21
Payer: COMMERCIAL

## 2022-03-21 VITALS
OXYGEN SATURATION: 97 % | SYSTOLIC BLOOD PRESSURE: 140 MMHG | HEART RATE: 97 BPM | TEMPERATURE: 98 F | DIASTOLIC BLOOD PRESSURE: 88 MMHG

## 2022-03-21 PROCEDURE — G0158 HHC OT ASSISTANT EA 15: HCPCS

## 2022-03-22 ENCOUNTER — TELEPHONE (OUTPATIENT)
Dept: PRIMARY CARE CLINIC | Age: 57
End: 2022-03-22

## 2022-03-22 ENCOUNTER — HOME CARE VISIT (OUTPATIENT)
Dept: SCHEDULING | Facility: HOME HEALTH | Age: 57
End: 2022-03-22
Payer: COMMERCIAL

## 2022-03-22 VITALS
DIASTOLIC BLOOD PRESSURE: 72 MMHG | SYSTOLIC BLOOD PRESSURE: 146 MMHG | RESPIRATION RATE: 16 BRPM | TEMPERATURE: 97 F | HEART RATE: 80 BPM | OXYGEN SATURATION: 96 %

## 2022-03-22 PROCEDURE — G0300 HHS/HOSPICE OF LPN EA 15 MIN: HCPCS

## 2022-03-22 NOTE — TELEPHONE ENCOUNTER
Paras from [facility] called to report elevated levels of blood sugar and blood pressure. Pts blood sugar was 444 and blood pressure was 146/72. Nanette Stakes can be reached at [phone].

## 2022-03-23 ENCOUNTER — HOME CARE VISIT (OUTPATIENT)
Dept: HOME HEALTH SERVICES | Facility: HOME HEALTH | Age: 57
End: 2022-03-23
Payer: COMMERCIAL

## 2022-03-23 ENCOUNTER — HOME CARE VISIT (OUTPATIENT)
Dept: SCHEDULING | Facility: HOME HEALTH | Age: 57
End: 2022-03-23
Payer: COMMERCIAL

## 2022-03-23 VITALS
OXYGEN SATURATION: 96 % | SYSTOLIC BLOOD PRESSURE: 145 MMHG | TEMPERATURE: 98 F | RESPIRATION RATE: 16 BRPM | HEART RATE: 88 BPM | DIASTOLIC BLOOD PRESSURE: 70 MMHG

## 2022-03-23 DIAGNOSIS — E11.8 CONTROLLED TYPE 2 DIABETES MELLITUS WITH COMPLICATION, WITH LONG-TERM CURRENT USE OF INSULIN (HCC): Primary | ICD-10-CM

## 2022-03-23 DIAGNOSIS — Z79.4 CONTROLLED TYPE 2 DIABETES MELLITUS WITH COMPLICATION, WITH LONG-TERM CURRENT USE OF INSULIN (HCC): Primary | ICD-10-CM

## 2022-03-23 PROCEDURE — 3046F HEMOGLOBIN A1C LEVEL >9.0%: CPT | Performed by: NURSE PRACTITIONER

## 2022-03-23 PROCEDURE — G0152 HHCP-SERV OF OT,EA 15 MIN: HCPCS

## 2022-03-23 RX ORDER — BLOOD SUGAR DIAGNOSTIC
STRIP MISCELLANEOUS
Qty: 100 STRIP | Refills: 11 | Status: SHIPPED | OUTPATIENT
Start: 2022-03-23 | End: 2022-06-15 | Stop reason: SDUPTHER

## 2022-03-23 RX ORDER — INSULIN PUMP SYRINGE, 3 ML
EACH MISCELLANEOUS
Qty: 1 KIT | Refills: 0 | Status: SHIPPED | OUTPATIENT
Start: 2022-03-23

## 2022-03-24 ENCOUNTER — HOME CARE VISIT (OUTPATIENT)
Dept: SCHEDULING | Facility: HOME HEALTH | Age: 57
End: 2022-03-24
Payer: COMMERCIAL

## 2022-03-24 ENCOUNTER — OFFICE VISIT (OUTPATIENT)
Dept: PRIMARY CARE CLINIC | Age: 57
End: 2022-03-24
Payer: COMMERCIAL

## 2022-03-24 VITALS
OXYGEN SATURATION: 97 % | HEART RATE: 52 BPM | TEMPERATURE: 96.8 F | DIASTOLIC BLOOD PRESSURE: 72 MMHG | SYSTOLIC BLOOD PRESSURE: 127 MMHG | RESPIRATION RATE: 22 BRPM

## 2022-03-24 VITALS
OXYGEN SATURATION: 99 % | RESPIRATION RATE: 18 BRPM | HEIGHT: 67 IN | DIASTOLIC BLOOD PRESSURE: 72 MMHG | HEART RATE: 95 BPM | TEMPERATURE: 97.3 F | WEIGHT: 238 LBS | BODY MASS INDEX: 37.35 KG/M2 | SYSTOLIC BLOOD PRESSURE: 113 MMHG

## 2022-03-24 DIAGNOSIS — Z86.73 HISTORY OF CVA (CEREBROVASCULAR ACCIDENT): ICD-10-CM

## 2022-03-24 DIAGNOSIS — E11.9 TYPE 2 DIABETES MELLITUS WITHOUT COMPLICATION, WITH LONG-TERM CURRENT USE OF INSULIN (HCC): Primary | ICD-10-CM

## 2022-03-24 DIAGNOSIS — Z79.4 TYPE 2 DIABETES MELLITUS WITHOUT COMPLICATION, WITH LONG-TERM CURRENT USE OF INSULIN (HCC): Primary | ICD-10-CM

## 2022-03-24 DIAGNOSIS — I10 ESSENTIAL HYPERTENSION, BENIGN: ICD-10-CM

## 2022-03-24 DIAGNOSIS — E78.5 HYPERLIPIDEMIA WITH TARGET LDL LESS THAN 70: ICD-10-CM

## 2022-03-24 DIAGNOSIS — R53.1 LEFT-SIDED WEAKNESS: ICD-10-CM

## 2022-03-24 PROCEDURE — G0300 HHS/HOSPICE OF LPN EA 15 MIN: HCPCS

## 2022-03-24 PROCEDURE — 3046F HEMOGLOBIN A1C LEVEL >9.0%: CPT | Performed by: INTERNAL MEDICINE

## 2022-03-24 PROCEDURE — 99214 OFFICE O/P EST MOD 30 MIN: CPT | Performed by: INTERNAL MEDICINE

## 2022-03-24 RX ORDER — SITAGLIPTIN AND METFORMIN HYDROCHLORIDE 50; 1000 MG/1; MG/1
1 TABLET, FILM COATED ORAL 2 TIMES DAILY WITH MEALS
Qty: 180 TABLET | Refills: 2 | Status: SHIPPED | OUTPATIENT
Start: 2022-03-24 | End: 2022-03-28 | Stop reason: ALTCHOICE

## 2022-03-24 NOTE — PROGRESS NOTES
Chief Complaint   Patient presents with    Hypertension    Diabetes        Visit Vitals  /72 (BP 1 Location: Right upper arm, BP Patient Position: Sitting)   Pulse 95   Temp 97.3 °F (36.3 °C) (Temporal)   Resp 18   Ht 5' 7\" (1.702 m)   Wt 238 lb (108 kg)   SpO2 99%   BMI 37.28 kg/m²      Glucose 350     1. Have you been to the ER, urgent care clinic since your last visit? Hospitalized since your last visit? No    2. Have you seen or consulted any other health care providers outside of the [address] [place] [address] since your last visit? Include any pap smears or colon screening.  No

## 2022-03-24 NOTE — PROGRESS NOTES
Donata Fothergill is a 55 y.o.  male and presents with     Chief Complaint   Patient presents with    Hypertension    Diabetes     Pt is emeli to establish care. Pt has h/o DM, HTN, hyprchol, CADE. Pt had gait imbalance in August 2021 when he was admitted to hospital. MRI showed small lacunar infacrt. Pt was re admitted to hospital in February for left sided weakness  Repeat MRI brain showed small parietal infarct. Pt  Was seen by Neurology. Pt has follow up appt [facility] Neurology in June  Pt had ECHO that showed normal EF , no shunting of blood. Good DM and HTN control was emphazised. Pts does not have Janumet. Blood sugars are running high  BP seems to be controlled. Pt is getting PT. Past Medical History:   Diagnosis Date    Abscess     Diabetes ([address]     Hypercholesterolemia     Sleep apnea     Stroke ([facility]     Vertigo      No past surgical history on file. Current Outpatient Medications   Medication Sig    SITagliptin-metFORMIN (Janumet) 50-1,000 mg per tablet Take 1 Tablet by mouth two (2) times daily (with meals).  Blood-Glucose Meter monitoring kit Please take blood sugar three times per day.  glucose blood VI test strips (OneTouch Ultra Test) strip Check blood sugar in AM ( fasting ) and 2 hours after dinner.  glipiZIDE (GLUCOTROL) 10 mg tablet Take 1 Tablet by mouth two (2) times a day.  atorvastatin (LIPITOR) 80 mg tablet Take 1 Tablet by mouth daily for 90 days.  amLODIPine (NORVASC) 10 mg tablet Take 1 Tablet by mouth daily for 90 days.  aspirin 81 mg chewable tablet Take 1 Tablet by mouth daily for 90 days.  insulin nph-regular human rec (HUMULIN 70-30) 100 unit/mL (70-30) inpn 15 Units by SubCUTAneous route two (2) times daily (with meals).  Insulin Needles, Disposable, 31 gauge x 5/16\" ndle Use once/day    Insulin Needles, Disposable, (Esther Pen Needle) 32 gauge x 5/32\" ndle 1 Units by SubCUTAneous route daily.     OTHER,NON-FORMULARY, Need PT/OT for 5 times a week  bed mobility training, transfer training, gait training, therapeutic exercises, neuromuscular re-education, patient and family training/education and therapeutic activities (Patient not taking: Reported on 3/24/2022)    lancets (ONETOUCH DELICA LANCETS) 30 gauge misc Check blood sugar in AM ( fasting ) and 2 hours after dinner. (Patient not taking: Reported on 1/3/2022)     No current facility-administered medications for this visit. Health Maintenance   Topic Date Due    Hepatitis C Screening  Never done    COVID-19 Vaccine (1) Never done    Pneumococcal 0-64 years (1 of 2 - PPSV23) Never done    Eye Exam Retinal or Dilated  Never done    DTaP/Tdap/Td series (1 - Tdap) Never done    Colorectal Cancer Screening Combo  Never done    Shingrix Vaccine Age 50> (1 of 2) Never done    Foot Exam Q1  09/30/2021    A1C test (Diabetic or Prediabetic)  05/09/2022    MICROALBUMIN Q1  01/03/2023    Lipid Screen  02/09/2023    Depression Screen  02/14/2023    Flu Vaccine  Completed     Immunization History   Administered Date(s) Administered    Influenza Vaccine (Quad) PF (>6 Mo Flulaval, Fluarix, and >3 Yrs [address] [place] [address], [place] [address]) 01/03/2022     No LMP for male patient. Allergies and Intolerances:   No Known Allergies    Family History:   Family History   Problem Relation Age of Onset    Diabetes Mother     Alcohol abuse Father     No Known Problems Sister     Diabetes Brother     Stroke Brother     No Known Problems Brother     No Known Problems Brother     No Known Problems Sister        Social History:   He  reports that he has been smoking cigarettes. He started smoking about 38 years ago. He has a 38.00 pack-year smoking history. He has never used smokeless tobacco.  He  reports current alcohol use.             Review of Systems:   General: negative for - chills, fatigue, fever, weight change  Psych: negative for - anxiety, depression, irritability or mood swings  ENT: negative for - headaches, hearing change, nasal congestion, oral lesions, sneezing or sore throat  Heme/ Lymph: negative for - bleeding problems, bruising, pallor or swollen lymph nodes  Endo: negative for - hot flashes, polydipsia/polyuria or temperature intolerance  Resp: negative for - cough, shortness of breath or wheezing  CV: negative for - chest pain, edema or palpitations  GI: negative for - abdominal pain, change in bowel habits, constipation, diarrhea or nausea/vomiting  : negative for - dysuria, hematuria, incontinence, pelvic pain or vulvar/vaginal symptoms  MSK: negative for - joint pain, joint swelling or muscle pain  Neuro: negative for - confusion, headaches, seizures or weakness  Derm: negative for - dry skin, hair changes, rash or skin lesion changes          Physical:   Vitals:   Vitals:    03/24/22 1149   BP: 113/72   Pulse: 95   Resp: 18   Temp: 97.3 °F (36.3 °C)   TempSrc: Temporal   SpO2: 99%   Weight: 238 lb (108 kg)   Height: 5' 7\" (1.702 m)           Exam:   HEENT- atraumatic,normocephalic, awake, oriented, well nourished  Neck - supple,no enlarged lymph nodes, no JVD, no thyromegaly  Chest- CTA, no rhonchi, no crackles  Heart- rrr, no murmurs / gallop/rub  Abdomen- soft,BS+,NT, no hepatosplenomegaly  Ext - no c/c/edema   Neuro- no focal deficits. Power 5/5 all extremities, finger nose test left side - poor co ordination, limps hile walking, left lower  Weak. Skin - warm,dry, no obvious rashes.           Review of Data:   LABS:   Lab Results   Component Value Date/Time    WBC 7.2 02/09/2022 02:29 AM    HGB 13.1 02/09/2022 02:29 AM    HCT 38.8 02/09/2022 02:29 AM    PLATELET 891 (L) 16/73/1444 02:29 AM     Lab Results   Component Value Date/Time    Sodium 138 02/09/2022 02:29 AM    Potassium 4.0 02/09/2022 02:29 AM    Chloride 106 02/09/2022 02:29 AM    CO2 28 02/09/2022 02:29 AM    Glucose 165 (H) 02/09/2022 02:29 AM    BUN 9 02/09/2022 02:29 AM    Creatinine 0.67 (L) 02/09/2022 02:29 AM     Lab Results Component Value Date/Time    Cholesterol, total 175 02/09/2022 02:29 AM    HDL Cholesterol 48 02/09/2022 02:29 AM    LDL, calculated 113.4 (H) 02/09/2022 02:29 AM    Triglyceride 68 02/09/2022 02:29 AM     No components found for: GPT        Impression / Plan:        ICD-10-CM ICD-9-CM    1. Type 2 diabetes mellitus without complication, with long-term current use of insulin (HCC)  E11.9 250.00 SITagliptin-metFORMIN (Janumet) 50-1,000 mg per tablet    Z79.4 V58.67 REFERRAL TO ENDOCRINOLOGY   2. History of CVA (cerebrovascular accident)  Z86.73 V12.54    3. Hyperlipidemia with target LDL less than 70  E78.5 272.4    4. Essential hypertension, benign  I10 401.1    5. Left-sided weakness  R53.1 728.87          Monitor  blood sugars. Bring blood sugar log , follow up in 2 months    Handicap parking form filled. follow up with Neurology        Explained to patient risk benefits of the medications. Advised patient to stop meds if having any side effects. Pt verbalized understanding of the instructions. I have discussed the diagnosis with the patient and the intended plan as seen in the above orders. The patient has received an after-visit summary and questions were answered concerning future plans. I have discussed medication side effects and warnings with the patient as well. I have reviewed the plan of care with the patient, accepted their input and they are in agreement with the treatment goals. Reviewed plan of care. Patient has provided input and agrees with goals.         Nicholas Velez MD

## 2022-03-28 ENCOUNTER — TELEPHONE (OUTPATIENT)
Dept: PRIMARY CARE CLINIC | Age: 57
End: 2022-03-28

## 2022-03-28 ENCOUNTER — HOME CARE VISIT (OUTPATIENT)
Dept: SCHEDULING | Facility: HOME HEALTH | Age: 57
End: 2022-03-28
Payer: COMMERCIAL

## 2022-03-28 VITALS
DIASTOLIC BLOOD PRESSURE: 84 MMHG | SYSTOLIC BLOOD PRESSURE: 136 MMHG | TEMPERATURE: 97.8 F | OXYGEN SATURATION: 98 % | HEART RATE: 90 BPM | RESPIRATION RATE: 16 BRPM

## 2022-03-28 DIAGNOSIS — Z79.4 TYPE 2 DIABETES MELLITUS WITHOUT COMPLICATION, WITH LONG-TERM CURRENT USE OF INSULIN (HCC): ICD-10-CM

## 2022-03-28 DIAGNOSIS — Z86.73 HISTORY OF CVA (CEREBROVASCULAR ACCIDENT): Primary | ICD-10-CM

## 2022-03-28 DIAGNOSIS — E11.9 TYPE 2 DIABETES MELLITUS WITHOUT COMPLICATION, WITH LONG-TERM CURRENT USE OF INSULIN (HCC): ICD-10-CM

## 2022-03-28 PROCEDURE — 3046F HEMOGLOBIN A1C LEVEL >9.0%: CPT | Performed by: INTERNAL MEDICINE

## 2022-03-28 PROCEDURE — 400013 HH SOC

## 2022-03-28 PROCEDURE — G0299 HHS/HOSPICE OF RN EA 15 MIN: HCPCS

## 2022-03-28 RX ORDER — METFORMIN HYDROCHLORIDE 1000 MG/1
1000 TABLET ORAL 2 TIMES DAILY WITH MEALS
Qty: 60 TABLET | Refills: 3 | Status: SHIPPED | OUTPATIENT
Start: 2022-03-28 | End: 2022-06-15 | Stop reason: SDUPTHER

## 2022-03-28 NOTE — TELEPHONE ENCOUNTER
Jimbo from [facility] asked since Dr. Lucas Bustamante saw him on 3/24/22, can he write a referral for out patient physical therapy and occupational therapy. Jimbo also said Dr. Lucas Bustamante refilled a prescription for Janumet. The Patients can't afford the medication so Jimbo asked if there is any other medication that can be prescribed to the patient.    [facility]  [phone]

## 2022-04-12 NOTE — TELEPHONE ENCOUNTER
Spoke with patient's sister. Apologized for the delay.  Explained to her where the mixup happened and I would speak with the provider regarding completing the paperwork and will call her back

## 2022-06-15 DIAGNOSIS — Z79.4 CONTROLLED TYPE 2 DIABETES MELLITUS WITH COMPLICATION, WITH LONG-TERM CURRENT USE OF INSULIN (HCC): ICD-10-CM

## 2022-06-15 DIAGNOSIS — E11.9 TYPE 2 DIABETES MELLITUS WITHOUT COMPLICATION, WITH LONG-TERM CURRENT USE OF INSULIN (HCC): ICD-10-CM

## 2022-06-15 DIAGNOSIS — Z91.14 NON COMPLIANCE W MEDICATION REGIMEN: ICD-10-CM

## 2022-06-15 DIAGNOSIS — E11.8 CONTROLLED TYPE 2 DIABETES MELLITUS WITH COMPLICATION, WITH LONG-TERM CURRENT USE OF INSULIN (HCC): ICD-10-CM

## 2022-06-15 DIAGNOSIS — Z79.4 TYPE 2 DIABETES MELLITUS WITHOUT COMPLICATION, WITH LONG-TERM CURRENT USE OF INSULIN (HCC): ICD-10-CM

## 2022-06-15 RX ORDER — BLOOD SUGAR DIAGNOSTIC
STRIP MISCELLANEOUS
Qty: 100 STRIP | Refills: 11 | Status: SHIPPED | OUTPATIENT
Start: 2022-06-15

## 2022-06-15 RX ORDER — PEN NEEDLE, DIABETIC 30 GX3/16"
NEEDLE, DISPOSABLE MISCELLANEOUS
Qty: 100 PEN NEEDLE | Refills: 5 | Status: SHIPPED | OUTPATIENT
Start: 2022-06-15

## 2022-06-15 RX ORDER — GLIPIZIDE 10 MG/1
10 TABLET ORAL 2 TIMES DAILY
Qty: 180 TABLET | Refills: 0 | Status: SHIPPED | OUTPATIENT
Start: 2022-06-15 | End: 2022-08-08

## 2022-06-15 RX ORDER — PEN NEEDLE, DIABETIC 31 GX3/16"
1 NEEDLE, DISPOSABLE MISCELLANEOUS DAILY
Qty: 3 PEN NEEDLE | Refills: 6 | Status: SHIPPED | OUTPATIENT
Start: 2022-06-15

## 2022-06-15 RX ORDER — METFORMIN HYDROCHLORIDE 1000 MG/1
1000 TABLET ORAL 2 TIMES DAILY WITH MEALS
Qty: 60 TABLET | Refills: 3 | Status: SHIPPED | OUTPATIENT
Start: 2022-06-15

## 2022-06-15 NOTE — TELEPHONE ENCOUNTER
PCP: Jd Larson MD    Last appt: 2022  Future Appointments   Date Time Provider Slava Rojas   2022  9:00 AM Aralu, Berniece Severs, MD [facility] BS AMB   2022  1:45 PM Kathy Mohs M, MD [facility] BS AMB       Requested Prescriptions     Pending Prescriptions Disp Refills    metFORMIN (GLUCOPHAGE) 1,000 mg tablet 60 Tablet 3     Sig: Take 1 Tablet by mouth two (2) times daily (with meals).  glipiZIDE (GLUCOTROL) 10 mg tablet 180 Tablet 0     Sig: Take 1 Tablet by mouth two (2) times a day.  insulin nph-regular human rec (HUMULIN 70-30) 100 unit/mL (70-30) inpn 18 Adjustable Dose Pre-filled Pen Syringe 1     Sig: 15 Units by SubCUTAneous route two (2) times daily (with meals).  Insulin Needles, Disposable, 31 gauge x 5/16\" ndle       Sig: Use once/day    glucose blood VI test strips (OneTouch Ultra Test) strip 100 Strip 11     Sig: Check blood sugar in AM ( fasting ) and 2 hours after dinner.  Insulin Needles, Disposable, (Esther Pen Needle) 32 gauge x 5/32\" ndle 3 Pen Needle 6     Si Units by SubCUTAneous route daily.          Other Comments:

## 2022-06-15 NOTE — TELEPHONE ENCOUNTER
Patient is out of medication. Ana Granados 83 [address] [facility]  and you July 6th for follow up. Please call patient when medication has been sent.

## 2022-06-27 ENCOUNTER — OFFICE VISIT (OUTPATIENT)
Dept: NEUROLOGY | Age: 57
End: 2022-06-27
Payer: COMMERCIAL

## 2022-06-27 VITALS
DIASTOLIC BLOOD PRESSURE: 94 MMHG | TEMPERATURE: 97.7 F | HEART RATE: 78 BPM | RESPIRATION RATE: 18 BRPM | HEIGHT: 67 IN | WEIGHT: 238.8 LBS | OXYGEN SATURATION: 98 % | SYSTOLIC BLOOD PRESSURE: 144 MMHG | BODY MASS INDEX: 37.48 KG/M2

## 2022-06-27 DIAGNOSIS — F17.210 CIGARETTE NICOTINE DEPENDENCE WITHOUT COMPLICATION: ICD-10-CM

## 2022-06-27 DIAGNOSIS — I67.82 CEREBRAL ISCHEMIA: ICD-10-CM

## 2022-06-27 DIAGNOSIS — I63.9 CEREBROVASCULAR ACCIDENT (CVA), UNSPECIFIED MECHANISM (HCC): Primary | ICD-10-CM

## 2022-06-27 DIAGNOSIS — R26.9 GAIT DISORDER: ICD-10-CM

## 2022-06-27 DIAGNOSIS — G81.94 LEFT HEMIPARESIS (HCC): ICD-10-CM

## 2022-06-27 PROCEDURE — 99215 OFFICE O/P EST HI 40 MIN: CPT | Performed by: PSYCHIATRY & NEUROLOGY

## 2022-06-27 RX ORDER — FOLIC ACID 1 MG/1
1 TABLET ORAL DAILY
Qty: 30 TABLET | Refills: 11 | Status: SHIPPED | OUTPATIENT
Start: 2022-06-27

## 2022-06-27 RX ORDER — ATORVASTATIN CALCIUM 40 MG/1
40 TABLET, FILM COATED ORAL DAILY
Qty: 30 TABLET | Refills: 11 | Status: SHIPPED | OUTPATIENT
Start: 2022-06-27

## 2022-06-27 NOTE — PROGRESS NOTES
Neurology Consult Note      HISTORY PROVIDED BY: patient    Chief Complaint:   Chief Complaint   Patient presents with    New Patient     referred by pcp for left sided weakness - had stroke the end of february       Subjective:    Nba Corona is a 65 y.o. right handed male who presents in consultation for evaluation and management of CVA  This is a 61-year-old right-handed black man with history of diabetes mellitus, sleep apnea, CVA, vertigo, who was referred to the clinic to evaluate and continue management of cerebrovascular accident  Patient states that he  had stroke in February 8 2022 in which he developed left-sided weakness. Patient apparently had a first episode of left-sided weakness in 2021, was in the emergency room, but left according to records AMA, came back, at that time MRI was unremarkable. He was slightly weak in his left side, was able to work with it. In February 2022, patient developed left-sided weakness more than he used to have since August 2021, he was unable to do his left hand at work so he had to go to emergency room. He was subsequently admitted and noted to have subacute and acute infarcts. He is still on his left side, aside from the weakness, patient does not have any pain. He had a fall because of weakness. Patient did not follow through with physical therapy when he was discharged from the hospital.  Apparently, patient has relocated to [facility] where he would be closer to his family since after the stroke. He says he does not have control of his left side. He denies loss of consciousness, dysphagia or odynophagia.   Review of Systems - General ROS: positive for  - fatigue and sleep disturbance  Psychological ROS: positive for - concentration difficulties and sleep disturbances  Ophthalmic ROS: positive for - decreased vision  ENT ROS: positive for - tinnitus, vertigo and visual changes  Allergy and Immunology ROS: negative  Hematological and Lymphatic ROS: negative  Endocrine ROS: negative  Respiratory ROS: no cough, shortness of breath, or wheezing  Cardiovascular ROS: no chest pain or dyspnea on exertion  Gastrointestinal ROS: no abdominal pain, change in bowel habits, or black or bloody stools  Genito-Urinary ROS: no dysuria, trouble voiding, or hematuria  Musculoskeletal ROS: positive for - gait disturbance, joint pain, joint stiffness, muscle pain and muscular weakness  Neurological ROS: positive for - dizziness, gait disturbance, impaired coordination/balance, numbness/tingling, visual changes and weakness  Dermatological ROS: negative      Past Medical History:   Diagnosis Date    Abscess     Diabetes ([facility]     Hypercholesterolemia     Sleep apnea     Stroke ([facility]     Vertigo       No past surgical history on file. Social History     Socioeconomic History    Marital status: SINGLE     Spouse name: Not on file    Number of children: Not on file    Years of education: Not on file    Highest education level: Not on file   Occupational History    Not on file   Tobacco Use    Smoking status: Current Every Day Smoker     Packs/day: 1.00     Years: 40.00     Pack years: 40.00     Types: Cigarettes     Start date: 3/24/1984    Smokeless tobacco: Never Used   Vaping Use    Vaping Use: Never used   Substance and Sexual Activity    Alcohol use: Not Currently     Comment: holidays, several timses a yr    Drug use: Never    Sexual activity: Yes     Partners: Female   Other Topics Concern    Not on file   Social History Narrative    Not on file     Social Determinants of Health     Financial Resource Strain:     Difficulty of Paying Living Expenses: Not on file   Food Insecurity:     Worried About [address] [employer] in the Last Year: Not on file    [employer] of Food in the Last Year: Not on file   Transportation Needs:     Lack of Transportation (Medical): Not on file    Lack of Transportation (Non-Medical):  Not on file   Physical Activity:     Days of Exercise per Week: Not on file    Minutes of Exercise per Session: Not on file   Stress:     Feeling of Stress : Not on file   Social Connections:     Frequency of Communication with Friends and Family: Not on file    Frequency of Social Gatherings with Friends and Family: Not on file    Attends Church Services: Not on file    Active Member of Clubs or Organizations: Not on file    Attends Club or Organization Meetings: Not on file    Marital Status: Not on file   Intimate Partner Violence:     Fear of Current or Ex-Partner: Not on file    Emotionally Abused: Not on file    Physically Abused: Not on file    Sexually Abused: Not on file   Housing Stability:     Unable to Pay for Housing in the Last Year: Not on file    Number of [place] in the Last Year: Not on file    Unstable Housing in the Last Year: Not on file     Family History   Problem Relation Age of Onset    Diabetes Mother     Alcohol abuse Father     No Known Problems Sister     Diabetes Brother     Stroke Brother     No Known Problems Brother     No Known Problems Brother     No Known Problems Sister          Objective:   ROS  As per HPI  No Known Allergies     Meds:  Outpatient Medications Prior to Visit   Medication Sig Dispense Refill    metFORMIN (GLUCOPHAGE) 1,000 mg tablet Take 1 Tablet by mouth two (2) times daily (with meals). 60 Tablet 3    glipiZIDE (GLUCOTROL) 10 mg tablet Take 1 Tablet by mouth two (2) times a day. 180 Tablet 0    insulin nph-regular human rec (HUMULIN 70-30) 100 unit/mL (70-30) inpn 15 Units by SubCUTAneous route two (2) times daily (with meals). 18 Adjustable Dose Pre-filled Pen Syringe 1    Insulin Needles, Disposable, 31 gauge x 5/16\" ndle Use once/day 100 Pen Needle 5    glucose blood VI test strips (OneTouch Ultra Test) strip Check blood sugar in AM ( fasting ) and 2 hours after dinner.  100 Strip 11    Insulin Needles, Disposable, (Esther Pen Needle) 32 gauge x 5/32\" ndle 1 Units by SubCUTAneous route daily. 3 Pen Needle 6    Blood-Glucose Meter monitoring kit Please take blood sugar three times per day. 1 Kit 0    lancets (ONETOUCH DELICA LANCETS) 30 gauge misc Check blood sugar in AM ( fasting ) and 2 hours after dinner. [address], Need PT/OT for 5 times a week  bed mobility training, transfer training, gait training, therapeutic exercises, neuromuscular re-education, patient and family training/education and therapeutic activities (Patient not taking: Reported on 3/24/2022) 1 Each 0     No facility-administered medications prior to visit. Imaging:  MRI Results (most recent):  Results from Hospital Encounter encounter on 02/08/22    MRI BRAIN WO CONT    Narrative  EXAM: MRI BRAIN WO CONT    INDICATION: left weakness, assess for CVA    COMPARISON: 1/10/2022. CONTRAST: None. TECHNIQUE:  Multiplanar multisequence acquisition without contrast of the brain. FINDINGS:  The ventricles are normal in size and position. There is no acute hemorrhage,  extra-axial fluid collection, or mass effect. There is no cerebellar tonsillar  herniation. Expected arterial flow-voids are present. A moderate underlying  white matter disease is seen. There is a new 1 cm infarct in the deep right  cerebral white matter. The previously described left caudate infarct has  resolved. The paranasal sinuses, mastoid air cells, and middle ears are clear. The orbital  contents are within normal limits. No significant osseous or scalp lesions are  identified. Impression  Acute deep white matter right parietal infarct.   This result was called  by me at 0910 hours to ER  To Dr. Michelle Coleman via [facility]  [phone]     CT Results (most recent):       Reviewed records in [facility] and media tab today    Lab Review   Results for orders placed or performed in visit on 02/14/22   AMB POC GLUCOSE BLOOD, BY GLUCOSE MONITORING DEVICE   Result Value Ref Range    Glucose  MG/DL Exam:  Visit Vitals  BP (!) 144/94 (BP 1 Location: Right arm, BP Patient Position: Sitting, BP Cuff Size: Large adult)   Pulse 78   Temp 97.7 °F (36.5 °C) (Oral)   Resp 18   Ht 5' 7\" (1.702 m)   Wt 238 lb 12.8 oz (108.3 kg)   SpO2 98%   BMI 37.40 kg/m²     General:  Alert, cooperative, no distress. Head:  Normocephalic, without obvious abnormality, atraumatic. Respiratory:  Heart:   Non labored breathing  Regular rate and rhythm, no murmurs   Neck:   2+ carotids, no bruits   Extremities: Warm, no cyanosis or edema. Pulses: 2+ radial pulses. Neurologic:  MS: Alert and oriented x 4, speech intact. Language intact, able to name, repeat, and follow all commands. Attention and fund of knowledge appropriate. Recent and remote memory intact. Cranial Nerves:  II: visual fields Full to confrontation   II: pupils Equal, round, reactive to light   II: optic disc No papilledema   III,VII: ptosis none   III,IV,VI: extraocular muscles  EOMI, no nystagmus or diplopia   V: facial light touch sensation  normal   VII: facial muscle function   symmetric   VIII: hearing intact   IX: soft palate elevation  normal   XI: trapezius strength  5/5   XI: sternocleidomastoid strength 5/5   XII: tongue  Midline     Motor: normal bulk and tone, no tremor              Strength: 3/5 upper extremity, 4/5 left lower extremity, 5/5 right upper and lower extremity, no PD  Sensory: Equivocal sensation to LT, PP, temperature vibration left upper and lower extremity  Coordination: FTN and HTS abnormal left, MATTHEW abnormal left,Romberg positive  Gait: Unsteady  gait, hemiplegic gait, unable to heel, toe, and tandem walk  Reflexes: 3+ left, 2+ right ,  Plantar: Withdrawal right, mute left           Assessment/Plan       ICD-10-CM ICD-9-CM    1.  Cerebrovascular accident (CVA), unspecified mechanism ([facility]  I63.9 434.91 REFERRAL TO PHYSICAL THERAPY   2. Cerebral ischemia  I67.82 437.1 REFERRAL TO PHYSICAL THERAPY   3. Left hemiparesis ([facility] G81.94 342.90 REFERRAL TO PHYSICAL THERAPY   4. Gait disorder  R26.9 781.2 REFERRAL TO PHYSICAL THERAPY   5. Cigarette nicotine dependence without complication  X84.546 406.6    Plan:  Continue Plavix 75 mg p.o. daily  Continue Lipitor 40 mg p.o. nightly  Folic acid 1 mg p.o. daily  Physical therapy  Discussed the risk factors for stroke including cardiac, metabolic, genetic, endocrine and life style. Discussed the importance of identifying these risk factors and addressing the reversible risk factors. Discussed the preventive medications for stroke the benefits and risks of each. Medications discussed and all questions answered. Advised on the dangers of tobacco abuse  Advised on the benefits of discontinuation   Advised on available treatments. 10 minutes spent on counseling. Follow-up and Dispositions    · Return in about 1 year (around 6/27/2023). Thank you very much for this consultation.      Signed:  Gordo De La Garza MD  6/27/2022

## 2022-06-30 ENCOUNTER — HOSPITAL ENCOUNTER (OUTPATIENT)
Dept: PHYSICAL THERAPY | Age: 57
Discharge: HOME | End: 2022-06-30
Payer: COMMERCIAL

## 2022-06-30 PROCEDURE — 97161 PT EVAL LOW COMPLEX 20 MIN: CPT

## 2022-06-30 NOTE — THERAPY EVALUATION
[address] AND [facility], [address]  Ph: [phone]    Fax: [phone]    Plan of Care/Statement of Necessity for Physical Therapy Services  2-15    Patient name: Doc Vergara  : 1965  Provider#: [id number]  Referral source: Ann-Marie Mena MD      Medical/Treatment Diagnosis: Cerebrovascular accident (CVA), unspecified mechanism ([address] [I63.9]  Cerebral ischemia [I67.82]  Left hemiparesis ([address] [G81.94]  Gait disorder [R26.9]     Prior Hospitalization: see medical history     Comorbidities: see medical history  Prior Level of Function: Independent with all ADL's; no use of AD  Medications: Verified on Patient Summary List    Start of Care: 2022      Onset Date: 2022       The Plan of Care and following information is based on the information from the initial evaluation. Assessment/ key information:   Patient presents to physical therapy with decreased balance and lower left extremity weakness limiting functional activities. The patient would benefit from physical therapy to increase ROM, and strength to maximize function. Pt will receive treatment including lower extremity flexibility, ROM, strengthening, functional activities, balance, and proprioception activities. Patient's symptoms are consistent with post CVA resulting in left sided weakness. DPT to communicate with MD to add in occupational therapy services to address left UE weakness and decreased function. Pt is currently high falls risk due to left le weakness and decreased balance. Patient will be instructed in HEP with 1:1 supervision and given pictures to facilitate compliance. Patient would benefit from skilled physical therapy to progress toward goals and maximize function.   Thank you for this referral.        Evaluation Complexity History LOW Complexity : Zero comorbidities / personal factors that will impact the outcome / POC; Examination LOW Complexity : 1-2 Standardized tests and measures addressing body structure, function, activity limitation and / or participation in recreation  ;Presentation LOW Complexity : Stable, uncomplicated  ;Clinical Decision Making TUG Score: 15 seconds  Overall Complexity Rating: LOW     Problem List: decrease ROM, decrease strength, edema affecting function, impaired gait/ balance, decrease ADL/ functional abilitiies, decrease activity tolerance and decrease transfer abilities   Treatment Plan may include any combination of the following: Therapeutic exercise, Therapeutic activities, Neuromuscular re-education, Physical agent/modality, Gait/balance training, Manual therapy, Patient education, Functional mobility training and Stair training  Patient / Family readiness to learn indicated by: asking questions, trying to perform skills and interest  Persons(s) to be included in education: patient (P)  Barriers to Learning/Limitations: None  Patient Goal (s): to get back to normal  Patient Self Reported Health Status: fair  Rehabilitation Potential: good    Short Term Goals: To be accomplished in 5 treatments. 1. Patient will demonstrate independence and compliance with HEP in order to assist with carryover from PT services. 2.   Patient will be able to ambulate for 20 minutes independently without fatigue or LOB to increase functional mobility. 3.   Patient will be able to squat down to  object off ground without lob to increase functional mobility. 4.   Patient will be able to navigate stairs with step over gait pattern and use of B UE without lob to increase functional mobility. 5.   Patient will increase TUG score to 13 seconds in order to ambulate with less falls risk. Long Term Goals: To be accomplished in 10  treatments. 1.   Patient will demonstrate independence and compliance with HEP in order to assist with carryover from PT services.   2.   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 3.   Patient will be able to navigate stairs with step over gait pattern and use of 1 UE without lob to increase functional mobility. 4.   Patient will increase left hip flexion strength to 5/5 in order to improve gait quality. Frequency / Duration: Patient to be seen 2 times per week for 10 treatments. Patient/ Caregiver education and instruction: exercises    [x]  Plan of care has been reviewed with PTA    Luis M Vergara PT , DPT  2022     ________________________________________________________________________    I certify that the above Therapy Services are being furnished while the patient is under my care. I agree with the treatment plan and certify that this therapy is necessary.     [de-identified] Signature:____________________  Date:____________Time: _________    Patient name: Mell Ballard  : 1965  Provider#: [id number]

## 2022-06-30 NOTE — THERAPY EVALUATION
PT INITIAL EVALUATION NOTE 2-15    Patient Name: Ruben Zhang  Date:2022  : 1965  [x]  Patient  Verified  Payor: [employer] [place] / Plan: Mago Anand / Product Type: HMO /    In time:08  Out time:0900  Total Treatment Time (min): 30  Visit #: 1     Treatment Area: Cerebrovascular accident (CVA), unspecified mechanism ([address] [I63.9]  Cerebral ischemia [I67.82]  Left hemiparesis ([address] [G81.94]  Gait disorder [R26.9]    SUBJECTIVE  Pain Level (0-10 scale): 0/10  Any medication changes, allergies to medications, adverse drug reactions, diagnosis change, or new procedure performed?: [] No    [x] Yes (see summary sheet for update)  Subjective:     Pt is 65year old  [place] male who presents to physical therapy post cva resulting in left sided weakness at the end of 2022. Pt reports he also had another stroke about a year ago, he was having vertigo and the doctors attributed his stroke deficits to the vertigo. Pt reports after being hospitalized, he was sent home with home health for about a month with OT and PT. Pt then moved to [address], and has not had any therapy since home health. Pt repots difficulty with using his left leg, and difficulty closing his left hand. Pt reports difficulty lifting with his left arm, resulting in tremors, and wants to be able to get back to normal.  Pt is right handed. Pt reports he tries to walk about 20m a day.     PLOF: Independent with all ADL's; no use of AD  Mechanism of Injury: cva  Previous Treatment/Compliance: home health in 2022  Radiographs: n/a  What increases symptoms: ambulating  What decreases symptoms: rest  Work Hx: ; has not worked since 2022  Living Situation: lives with sister; sister does most of household chores; able to dress/bathe himself independently  Pt Goals: get his arm and leg right  Barriers: continued deficits  Motivation: Good  Substance use: None reported  Cognition: A&O x 4  Fall Assessment: No falls risk assessment indicated at this time. Past Medical History:  Past Medical History:   Diagnosis Date    Abscess     [facility]     Hypercholesterolemia     Sleep apnea     Stroke ([facility]     Vertigo      Past Surgical History:  No past surgical history on file. Current Medications:  Current Outpatient Medications   Medication Instructions    atorvastatin (LIPITOR) 40 mg, Oral, DAILY    Blood-Glucose Meter monitoring kit Please take blood sugar three times per day.  folic acid (FOLVITE) 1 mg, Oral, DAILY    glipiZIDE (GLUCOTROL) 10 mg, Oral, 2 TIMES DAILY    glucose blood VI test strips (OneTouch Ultra Test) strip Check blood sugar in AM ( fasting ) and 2 hours after dinner.  Insulin Needles (Disposable) (KYALH PEN NEEDLE) 1 Units, SubCUTAneous, DAILY    Insulin Needles, Disposable, 31 gauge x 5/16\" ndle Use once/day    insulin nph-regular human rec (HUMULIN 70-30) 100 unit/mL (70-30) inpn 15 Units, SubCUTAneous, 2 TIMES DAILY WITH MEALS    lancets (ONETOUCH DELICA LANCETS) 30 gauge misc Check blood sugar in AM ( fasting ) and 2 hours after dinner.  metFORMIN (GLUCOPHAGE) 1,000 mg, Oral, 2 TIMES DAILY WITH MEALS          OBJECTIVE/EXAMINATION  Posture:  wfl   Gait and Functional Mobility:  Pt ambulates with decreased eccentric L quad control; slight hip hike of left LE due to limited left DF. No use of AD. Limited weight bearing on left le. Palpation: n/a    Hip:  Strength AROM     Right Left Right Left    Flexion  Hip abduction  Hip extension  Hip adduction 5/5 4/5 supine  4/5  3/5  3+/5 [facility] WFL   Knee:  Strength AROM     Right Left Right Left    Flexion 5/5 4/5 [facility]    Extension 5/5 4+/5 [facility]   Ankle  Strength AROM     Right Left Right Left    Dorsiflexion 5/5 5/5 [facility]    Platarflexion 5/5 5/5 [facility] WFL   All ROM measurements are measured in degrees.       Pain Level (0-10 scale) post treatment: 0/10      ASSESSMENT:    [x]  See Plan of Care      Kale Mac Lv Ritter, PT, DPT  6/30/2022

## 2022-06-30 NOTE — PROGRESS NOTES
[address]  [place], [address] [facility]  Ph: [phone]    Fax: [phone]    Physician Communication    Name: Maico Huizar   : 1965   MD: Michael Rucker MD       Treatment Diagnosis: Cerebrovascular accident (CVA), unspecified mechanism ([address] [I63.9]  Cerebral ischemia [I67.82]  Left hemiparesis ([address] [G81.94]  Gait disorder [R26.9]    Patient reports difficulty in ADL's, fine motor skills, and use of left upper extremity since stroke. Patient would benefit from further assessment by Occupational Therapist.      Please sign attached prescription upon MD approval for patient to receive Occupational Therapy evaluation.        Thank you for your referral.         Doreen Das PT, DPT   2022

## 2022-07-05 ENCOUNTER — DOCUMENTATION ONLY (OUTPATIENT)
Dept: NEUROLOGY | Age: 57
End: 2022-07-05

## 2022-07-05 NOTE — PROGRESS NOTES
Faxed signed referral order to [facility] at [phone] and received fax confirmation. For OT therapy.

## 2022-07-06 ENCOUNTER — HOSPITAL ENCOUNTER (OUTPATIENT)
Dept: PHYSICAL THERAPY | Age: 57
Discharge: HOME | End: 2022-07-06
Payer: COMMERCIAL

## 2022-07-06 ENCOUNTER — APPOINTMENT (OUTPATIENT)
Dept: PHYSICAL THERAPY | Age: 57
End: 2022-07-06
Payer: COMMERCIAL

## 2022-07-06 PROCEDURE — 97110 THERAPEUTIC EXERCISES: CPT

## 2022-07-06 PROCEDURE — 97165 OT EVAL LOW COMPLEX 30 MIN: CPT

## 2022-07-06 NOTE — THERAPY EVALUATION
OT INITIAL EVALUATION NOTE 2-15    Patient Name: Francesca Rush  Date:2022  : 1965  [x]  Patient  Verified  Payor: Von Gerber / Plan: Misti Celaya / Product Type: HMO /    In time: 1000  Out time: 1100  Total Treatment Time (min): 60  Total Timed Codes (min): 60  1:1 Treatment Time (MC only): 60   Visit #: 1    Treatment Area: [facility]cerebro[facility] accident) ([facility] [I63.9]    SUBJECTIVE  Pain Level (0-10 scale): 0/10  Any medication changes, allergies to medications, adverse drug reactions, diagnosis change, or new procedure performed?: [x] No    [] Yes (see summary sheet for update)  Subjective functional status/changes:   [] No changes reported  Patient reports he suffered from a stroke at the end of 2022, resulting in left sided weakness. Patient reports he also had another stroke about a year ago, he was having vertigo and the doctors attributed his stroke deficits to the vertigo. Patient reports after being hospitalized, he was sent home with home health for about a month with OT and PT. Patient then moved to [facility] from [place], and has not had any therapy since [facility]. Patient reports difficulty with utilizing his left arm and difficulty closing his left hand. Patient reports he has difficulty lifting with his left arm, resulting in tremors, and wants to be able to get back to normal.  Patient is right handed. Patient reports increased difficulty with his left arm and hand coordination stating \"I can't control my arm. \" Patient states he continues to perform basic ADLs independently. Patient reports increased difficulty grasping items with his L hand and lifting heavier items with his L arm. Patient states he feels like his left arm is weaker than his left leg. PLOF: Independent with all ADLs/IADLs and functional mobility.   Mechanism of Injury: CVA  Previous Treatment/Compliance: home health in 2022  PMHx/Surgical Hx:   Past Medical History:   Diagnosis Date    Abscess     Diabetes ([facility]     Hypercholesterolemia     Sleep apnea     Stroke ([facility]     Vertigo      No past surgical history on file. Work Hx: ; has not worked since February 2022  Living Situation: Lives with his sister in a private residence. Pt Goals: Patient reports \"I want to get my strength back and arm working better. \"  Barriers: continued deficits  Motivation: Good  Substance use: none reported   Cognition: A & O x 4       OBJECTIVE        10 min Therapeutic Exercise:  [x] See flow sheet :   Rationale: increase ROM and increase strength to improve the patients ability to complete ADL/IADL tasks independently.      With   [x] TE   [] TA   [] neuro   [] other: Patient Education: [x] Review HEP    [] Progressed/Changed HEP based on:   [] positioning   [] body mechanics   [] transfers   [] heat/ice application   [] Splint wear/care   [] Sensory re-education   [] scar management      [] other:            Other Objective/Functional Measures:       Coordination: BUE                WFL          Impaired    Gross Motor  X   Fine Motor   X   Crossing the Midline   X   Bilateral Integration   X   Praxis X    Dexterity  X   Visual Motor X          9 hole peg test:   R- 26 sec  L- 55 sec      Strength: BUE                                                        3pt pinch            2pt pinch              Lateral pinch  Right 121# 22# 17# 28#   Left 37# 9# 9# 17#             Range of Motion: BUE     RUE   AROM L UE AROM   Shoulder flexion  152 145   Shoulder abduction  150 138   Forearm supination  65 65   Wrist flexion  65 65   Wrist extension  60 48   Radial Dev   18 16   Ulnar Deviation   40 32     R 4th digit PIP flexion- 80  R 4th digit MCP flexion- 75  R 5th digit PIP flexion-  84  R 5th digit MCP flexion- 78    L 4th digit PIP flexion- 60  L 4th digit MCP flexion- 56  L 5th digit PIP flexion- 66  L 5th digit MCP flexion- 64      Pain Level (0-10 scale) post treatment: 0/10    ASSESSMENT/Changes in Function:  Patient is a pleasant 57 y. o. male who presents to occupational therapy post CVA resulting in left sided weakness at the end of February 2022. Patient reports increased difficulty with utilizing his left UE and difficulty closing his left hand. Patient reports he has difficulty lifting with his left UE, resulting in tremors, and wants to be able to get back to normal.  Patient states he continues to perform basic ADLs independently, but requires increased assistance with IADL tasks. Patient reports increased difficulty grasping items with his left hand and lifting heavier items with his left UE secondary to decreased coordination.  Patient also presents with increased edema in his left hand, stating that has occurred since the stroke.  Patient demonstrates decreased L UE ROM, decreased L UE strength, decreased L UE fine motor coordination and increased L UE edema. Patient would benefit from skilled occupational therapy to improve L UE ROM, L UE strength, L UE fine motor coordination and decrease L UE edema with therapeutic exercises, therapeutic activities and modalities.        [x]  See Plan of [facility], GILL, [facility]/L, 7/6/2022

## 2022-07-06 NOTE — THERAPY EVALUATION
[address]  [place], [address]  Ph: [phone]    Fax: [phone]    Plan of Care/Statement of Necessity for Occupational Therapy Services      Patient name: Katie Posadas Start of Care: 2022   Referral source: Agustín Sales MD : 1965    Medical Diagnosis: CVA (cerebrovascular accident) [facility]) [I63.9]   Onset Date: 2022    Treatment Diagnosis: CVA (cerebrovascular accident) [facility]) [I63.9]   Prior Hospitalization: see medical history Provider#: [id number]   Medications: Verified on Patient summary List    Comorbidities: see medical history   Prior Level of Function:  Independent with all ADLs/IADLs and functional mobility. The Plan of Care and following information is based on the information from the initial evaluation. Assessment/ key information: Patient is a pleasant 57 y. o. male who presents to occupational therapy post CVA resulting in left sided weakness at the end of 2022. Patient reports increased difficulty with utilizing his left UE and difficulty closing his left hand.  Patient reports he has difficulty lifting with his left UE, resulting in tremors, and wants to be able to get back to normal.  Patient states he continues to perform basic ADLs independently, but requires increased assistance with IADL tasks. Patient reports increased difficulty grasping items with his left hand and lifting heavier items with his left UE secondary to decreased coordination.  Patient also presents with increased edema in his left hand, stating that has occurred since the stroke.  Patient demonstrates decreased L UE ROM, decreased L UE strength, decreased L UE fine motor coordination and increased L UE edema. Patient would benefit from skilled occupational therapy to improve L UE ROM, L UE strength, L UE fine motor coordination and decrease L UE edema with therapeutic exercises, therapeutic activities and modalities.  Evaluation Complexity: History LOW Complexity : Brief history review  Examination LOW Complexity : 1-3 performance deficits relating to physical, cognitive , or psychosocial skils that result in activity limitations and / or participation restrictions  Clinical Decision Making LOW Complexity : No comorbidities that affect functional and no verbal or physical assistance needed to complete eval tasks   Overall Complexity Rating: LOW     Problem List: Pain effecting function, Decreased range of motion, Decreased strength, Edema effecting function, Decreased coordination/prehension and Decreased ADL/functional abilities      Treatment Plan may include any combination of the following: Therapeutic exercise, Therapeutic activities, Physical agent/modality, Patient education and ADLs/IADLs    Patient / Family readiness to learn indicated by: asking questions, trying to perform skills and interest    Persons(s) to be included in education:   patient (P)    Barriers to Learning/Limitations: None    Patient Goal (s): \"I want to get my strength back and arm working better. \"    Patient Self Reported Health Status: fair    Rehabilitation Potential: good     Short Term Goals: To be accomplished in 6 treatments:  1- Patient will increase L  strength by 10# to improve L hand function. 2- Patient will increase L pinch strength by 3# each direction to improve L hand function.    3- Patient will improve L 9 hole peg by 20 seconds to improve fine motor coordination. 4- Patient will increase L shoulder abduction and L wrist extension (AROM) by 10 degrees to improve independence with ADL/IADL tasks. 5- Patient will increase L 4th & 5th digit PIP/MCP flexion (AROM) by 5 degrees to improve independence with ADL/IADL tasks.      Long Term Goals: To be accomplished in 16 treatments:     1- Patient will increase L  strength by 20# to improve L hand function. 2- Patient will report performing ADL/IADL tasks independently.   3- Patient will be independent with HEP. 4-  Patient will increase L 4th & 5th digit PIP/MCP flexion (AROM) by 10 degrees to improve independence with ADL/IADL tasks. Frequency / Duration: Patient to be seen 2 times per week for 12 treatments:    Patient/ Caregiver education and instruction: Diagnosis, prognosis, exercises  [x]  Plan of care has been reviewed with GILL Landin, HARJEETR/JIL, 7/6/2022     ________________________________________________________________________    I certify that the above Therapy Services are being furnished while the patient is under my care. I agree with the treatment plan and certify that this therapy is necessary.     Physician's Signature:____________________Date:____________Time:__________           Feng Wheeler MD        Please sign and return to:   [facility]  [address] [place] [address], [place] Jeronimo  Ph: [phone]    Fax: [phone]

## 2022-07-07 ENCOUNTER — HOSPITAL ENCOUNTER (OUTPATIENT)
Dept: PHYSICAL THERAPY | Age: 57
Discharge: HOME | End: 2022-07-07
Payer: COMMERCIAL

## 2022-07-07 PROCEDURE — 97110 THERAPEUTIC EXERCISES: CPT

## 2022-07-07 NOTE — PROGRESS NOTES
PT DAILY TREATMENT NOTE 2-15    Patient Name: Francesca Rush  [id number]  : 1965  [x]  Patient  Verified  Payor: Von Gerber / Plan: Misti Celaya / Product Type: HMO /    In time: 9:47  Out time:10:51  Total Treatment Time (min): 59  Visit #:  2    Treatment Area: Cerebrovascular accident (CVA), unspecified mechanism ([facility] [I63.9]  Cerebral ischemia [I67.82]  Left hemiparesis ([facility] [G81.94]  Gait disorder [R26.9]    SUBJECTIVE  Pain Level (0-10 scale): 0/10  Any medication changes, allergies to medications, adverse drug reactions, diagnosis change, or new procedure performed?: [x] No    [] Yes (see summary sheet for update)  Subjective functional status/changes:     Denies pain. OBJECTIVE            64 min Therapeutic Exercise:  [x] See flow sheet :   Rationale: increase ROM, increase strength, improve coordination, improve balance and increase proprioception to improve the patients ability to attain and maintain highest practicable functional mobility. With   [] TE   [] TA   [] neuro   [] other: Patient Education: [x] Review HEP    [] Progressed/Changed HEP based on:   [] positioning   [] body mechanics   [] transfers   [] heat/ice application    [] other:      Other Objective/Functional Measures: N/A     Pain Level (0-10 scale) post treatment: 0/10    ASSESSMENT/Changes in Function:   Patient required visual and verbal education and occas tactile cues for correct form and technique with exercises to target isolated muscle groups and to achieve optimal strengthening and range of motion. Pt. Required 4   seated rest breaks to complete all standing exercises secondary to poor tolerance to activity. Patient  tolerated treatment with no complaint. Pt. Able to demonstrate reciprocal gait to ascend and descend steps.   Patient will continue to benefit from skilled PT services to modify and progress therapeutic interventions, address functional mobility deficits, address ROM deficits, address strength deficits, analyze and cue movement patterns, assess and modify postural abnormalities and address imbalance/dizziness to attain remaining goals. [x]  See Plan of Care  []  See progress note/recertification  []  See Discharge Summary         Short Term Goals: To be accomplished in 5 treatments. 1. Patient will demonstrate independence and compliance with HEP in order to assist with carryover from PT services. 2.   Patient will be able to ambulate for 20 minutes independently without fatigue or LOB to increase functional mobility. 3.   Patient will be able to squat down to  object off ground without lob to increase functional mobility. 4.   Patient will be able to navigate stairs with step over gait pattern and use of B UE without lob to increase functional mobility. 5.   Patient will increase TUG score to 13 seconds in order to ambulate with less falls risk.        Long Term Goals: To be accomplished in 10  treatments. 1.   Patient will demonstrate independence and compliance with HEP in order to assist with carryover from PT services. 2.   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 3.   Patient will be able to navigate stairs with step over gait pattern and use of 1 UE without lob to increase functional mobility.   4.   Patient will increase left hip flexion strength to 5/5 in order to improve gait quality.     PLAN  [x]  Upgrade activities as tolerated     [x]  Continue plan of care  []  Update interventions per flow sheet       []  Discharge due to:_  []  Other:_      Finn Galvez, ASHLEY, L.P.T.A. 7/7/2022

## 2022-07-11 ENCOUNTER — HOSPITAL ENCOUNTER (OUTPATIENT)
Dept: PHYSICAL THERAPY | Age: 57
Discharge: HOME | End: 2022-07-11
Payer: COMMERCIAL

## 2022-07-11 PROCEDURE — 97016 VASOPNEUMATIC DEVICE THERAPY: CPT

## 2022-07-11 PROCEDURE — 97110 THERAPEUTIC EXERCISES: CPT

## 2022-07-11 NOTE — PROGRESS NOTES
PT DAILY TREATMENT NOTE 2-15    Patient Name: Lilly Gerard  Date:2022  : 1965  [x]  Patient  Verified  Payor: Soco Varghese / Plan: Alexandro Rosa / Product Type: HMO /    In time: 9:10 AM  Out time: 9:50 AM  Total Treatment Time (min): 40  Visit #:  3    Treatment Area: Cerebrovascular accident (CVA), unspecified mechanism ([address] [I63.9]  Cerebral ischemia [I67.82]  Left hemiparesis ([address] [G81.94]  Gait disorder [R26.9]    SUBJECTIVE  Pain Level (0-10 scale): 0/10  Any medication changes, allergies to medications, adverse drug reactions, diagnosis change, or new procedure performed?: [x] No    [] Yes (see summary sheet for update)  Subjective functional status/changes:       \"I took a new medication on Friday, 2022. \" The medication has made it difficult for me to see. It is better now, but I am going to call him today and stop taking the medication. OBJECTIVE    40 min Therapeutic Exercise:  [x] See flow sheet :   Rationale: increase ROM, increase strength, improve coordination, improve balance and increase proprioception   to improve the patients ability to ambulate for 20 minutes independently without fatigue or LOB to increase functional mobility. With   [] TE   [] TA   [] neuro   [] other: Patient Education: [x] Review HEP    [] Progressed/Changed HEP based on:   [] positioning   [] body mechanics   [] transfers   [] heat/ice application    [] other:      Other Objective: Post Vital signs: 140/91 HR 81, Strengthening/balance Exercises of both LE followed by  Rest periods     Pain Level (0-10 scale) post treatment: 0/10    ASSESSMENT/Changes in Function:   Patient tolerated treatment. Continued with standing exercises with frequent rest periods. Noticed that patient was closing his eyes during the exercises. He stated that he was taking a new medication and it affected his vision. He did not know the name of the medication.  Patient was advised to contact his MD regarding visual changes. He is going to contact his MD today after therapy. Patient will continue to benefit from skilled PT services to address functional mobility deficits, address ROM deficits, address strength deficits and analyze and address soft tissue restrictions to attain remaining goals. [x]  See Plan of Care  []  See progress note/recertification  []  See Discharge Summary         Progress towards goals / Updated goals:  Short Term Goals: To be accomplished in 5 treatments. 1. Patient will demonstrate independence and compliance with HEP in order to assist with carryover from PT services. 2.   Patient will be able to ambulate for 20 minutes independently without fatigue or LOB to increase functional mobility. 3.   Patient will be able to squat down to  object off ground without lob to increase functional mobility. 4.   Patient will be able to navigate stairs with step over gait pattern and use of B UE without lob to increase functional mobility. 5.   Patient will increase TUG score to 13 seconds in order to ambulate with less falls risk.        Long Term Goals: To be accomplished in 10  treatments. 1.   Patient will demonstrate independence and compliance with HEP in order to assist with carryover from PT services. 2.   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 3.   Patient will be able to navigate stairs with step over gait pattern and use of 1 UE without lob to increase functional mobility. 4.   Patient will increase left hip flexion strength to 5/5 in order to improve gait quality.     PLAN  [x]  Upgrade activities as tolerated     [x]  Continue plan of care  []  Update interventions per flow sheet       []  Discharge due to:_  []  Other:_      Natasha Smith, PT,  7/11/2022

## 2022-07-11 NOTE — PROGRESS NOTES
OT DAILY TREATMENT NOTE  3-16    Patient Name: Juana Eller  Date:2022  : 1965  [x]  Patient  Verified  Payor: Edwards Guard / Plan: [place] Sirisha / Product Type: HMO /    In time:810  Out time: 907  Total Treatment Time (min): 62  Visit #: 2    Treatment Area: CVA (cerebrovascular accident) ([place] [address].) [I63.9]    SUBJECTIVE  Pain Level (0-10 scale): 0/10  Any medication changes, allergies to medications, adverse drug reactions, diagnosis change, or new procedure performed?: [x] No    [] Yes (see summary sheet for update)  Subjective functional status/changes:   [x] No changes reported      OBJECTIVE    Modality rationale: decrease edema to improve the patients ability to complete ADL/IADL tasks independently with use of L hand.     Min Type Additional Details    [] Estim:  []Unatt       []IFC  []Premod                        []Other:  []w/ice   []w/heat  Position:  Location:    [] Estim: []Att    []TENS instruct  []NMES                    []Other:  []w/US   []w/ice   []w/heat  Position:  Location:    []  Traction: [] Cervical       []Lumbar                       [] Prone          []Supine                       []Intermittent   []Continuous Lbs:  [] before manual  [] after manual    []  Ultrasound: []Continuous   [] Pulsed                           []1MHz   []3MHz W/cm2:  Location:    []  Iontophoresis with dexamethasone         Location: [] Take home patch   [] In clinic    []  Ice     []  heat  []  Ice massage  []  Laser   []  Anodyne Position:  Location:    []  Laser with stim  []  Other:  Position:  Location:   10 [x]  Vasopneumatic Device Pressure:       [] lo [x] med [] hi   Temperature: [x] lo [] med [] hi   [x] Skin assessment post-treatment:  [x]intact []redness- no adverse reaction    []redness - adverse reaction:     47 min Therapeutic Exercise:  [x] See flow sheet :   Rationale: increase ROM and increase strength to improve the patients ability to complete ADL/IADL tasks independently with use of L hand. With   [x] TE   [] TA   [] neuro   [] other: Patient Education: [x] Review HEP    [] Progressed/Changed HEP based on:   [] positioning   [] body mechanics   [] transfers   [] heat/ice application   [] Splint wear/care   [] Sensory re-education   [] scar management      [] other:        Pain Level (0-10 scale) post treatment: 0/10    ASSESSMENT/Changes in Function: Patient tolerated treatment session well with minimal complaints throughout. Patient tolerated 1-2 sets of 10 reps of L UE therapeutic exercises. Patient demonstrated increased difficulty grasping coins from table with L hand. Patient will continue to benefit from skilled OT services to address ROM deficits and address strength deficits to attain remaining goals. [x]  See Plan of Care  []  See progress note/recertification  []  See Discharge Summary         Progress towards goals / Updated goals:  Short Term Goals: To be accomplished in 6 treatments:  1- Patient will increase L  strength by 10# to improve L hand function. 2- Patient will increase L pinch strength by 3# each direction to improve L hand function.    3- Patient will improve L 9 hole peg by 20 seconds to improve fine motor coordination. 4- Patient will increase L shoulder abduction and L wrist extension (AROM) by 10 degrees to improve independence with ADL/IADL tasks. 5- Patient will increase L 4th & 5th digit PIP/MCP flexion (AROM) by 5 degrees to improve independence with ADL/IADL tasks.      Long Term Goals: To be accomplished in 16 treatments:     1- Patient will increase L  strength by 20# to improve L hand function. 2- Patient will report performing ADL/IADL tasks independently. 3- Patient will be independent with HEP. 4-  Patient will increase L 4th & 5th digit PIP/MCP flexion (AROM) by 10 degrees to improve independence with ADL/IADL tasks.      PLAN  [x]  Upgrade activities as tolerated     [x]  Continue plan of care  []  Update interventions per flow sheet       []  Discharge due to:_  []  Other:_      GILL Wiggins, OTR/JIL 7/11/2022

## 2022-07-13 ENCOUNTER — HOSPITAL ENCOUNTER (OUTPATIENT)
Dept: PHYSICAL THERAPY | Age: 57
Discharge: HOME | End: 2022-07-13
Payer: COMMERCIAL

## 2022-07-13 PROCEDURE — 97016 VASOPNEUMATIC DEVICE THERAPY: CPT

## 2022-07-13 PROCEDURE — 97110 THERAPEUTIC EXERCISES: CPT

## 2022-07-13 NOTE — PROGRESS NOTES
PT DAILY TREATMENT NOTE 2-15    Patient Name: Hi Garcia  Date:2022  : 1965  [x]  Patient  Verified  Payor: Melchor Collado / Plan: [employer] / Product Type: HMO /    In time: 9:03  Out time: 9:58  Total Treatment Time (min): 55  Visit #:  4    Treatment Area: Cerebrovascular accident (CVA), unspecified mechanism ([facility] [address] [I63.9]  Cerebral ischemia [I67.82]  Left hemiparesis ([facility] [address] [G81.94]  Gait disorder [R26.9]    SUBJECTIVE  Pain Level (0-10 scale): 0/10  Any medication changes, allergies to medications, adverse drug reactions, diagnosis change, or new procedure performed?: [x] No    [] Yes (see summary sheet for update)  Subjective functional status/changes:     \"I'm doing ok. \"    OBJECTIVE    55 min Therapeutic Exercise:  [x] See flow sheet :   Rationale: increase ROM and increase strength to improve the patients ability to improve functional mobility    With   [x] TE   [] TA   [] neuro   [] other: Patient Education: [x] Review HEP    [] Progressed/Changed HEP based on:   [] positioning   [] body mechanics   [] transfers   [] heat/ice application    [] other:      Pain Level (0-10 scale) post treatment: 0/10    ASSESSMENT/Changes in Function: Patient tolerated treatment fair. Pt required seated rest breaks due to fatigue and decreased endurance. Pt c/o his L leg hurting and L arm during session, which was the affected stroke side. Patient will continue to benefit from skilled PT services to address functional mobility deficits, address ROM deficits and address strength deficits to attain remaining goals. [x]  See Plan of Care  []  See progress note/recertification  []  See Discharge Summary         Progress towards goals / Updated goals:  Short Term Goals: To be accomplished in 5 treatments. 1. Patient will demonstrate independence and compliance with HEP in order to assist with carryover from PT services.   2.   Patient will be able to ambulate for 20 minutes independently without fatigue or LOB to increase functional mobility. 3.   Patient will be able to squat down to  object off ground without lob to increase functional mobility. 4.   Patient will be able to navigate stairs with step over gait pattern and use of B UE without lob to increase functional mobility. 5.   Patient will increase TUG score to 13 seconds in order to ambulate with less falls risk. Long Term Goals: To be accomplished in 10  treatments. 1.   Patient will demonstrate independence and compliance with HEP in order to assist with carryover from PT services. 2.   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 3.   Patient will be able to navigate stairs with step over gait pattern and use of 1 UE without lob to increase functional mobility. 4.   Patient will increase left hip flexion strength to 5/5 in order to improve gait quality.     PLAN  [x]  Upgrade activities as tolerated     [x]  Continue plan of care  []  Update interventions per flow sheet       []  Discharge due to:_  []  Other:_      Ebony Wells PTA, LPTA 7/13/2022

## 2022-07-13 NOTE — PROGRESS NOTES
OT DAILY TREATMENT NOTE  3-16    Patient Name: Siri Aguilar  Date:2022  : 1965  [x]  Patient  Verified  Payor: Shelley Ospina / Plan: Algis Bamberger / Product Type: HMO /    In time:805  Out time: 900  Total Treatment Time (min): 54  Visit #: 3    Treatment Area: CVA (cerebrovascular accident) ([place] [address] [I63.9]    SUBJECTIVE  Pain Level (0-10 scale): 0/10  Any medication changes, allergies to medications, adverse drug reactions, diagnosis change, or new procedure performed?: [x] No    [] Yes (see summary sheet for update)  Subjective functional status/changes:   [] No changes reported  Patient reported \"my hand is still swollen and tight. \"    OBJECTIVE    Modality rationale: decrease edema and decrease pain to improve the patients ability to complete ADL/IADL tasks independently with use of L hand.      Min Type Additional Details    [] Estim:  []Unatt       []IFC  []Premod                        []Other:  []w/ice   []w/heat  Position:  Location:    [] Estim: []Att    []TENS instruct  []NMES                    []Other:  []w/US   []w/ice   []w/heat  Position:  Location:    []  Traction: [] Cervical       []Lumbar                       [] Prone          []Supine                       []Intermittent   []Continuous Lbs:  [] before manual  [] after manual    []  Ultrasound: []Continuous   [] Pulsed                           []1MHz   []3MHz W/cm2:  Location:    []  Iontophoresis with dexamethasone         Location: [] Take home patch   [] In clinic    []  Ice     []  heat  []  Ice massage  []  Laser   []  Anodyne Position:  Location:    []  Laser with stim  []  Other:  Position:  Location:   10 [x]  Vasopneumatic Device Pressure:       [] lo [x] med [] hi   Temperature: [x] lo [] med [] hi   [x] Skin assessment post-treatment:  [x]intact []redness- no adverse reaction    []redness - adverse reaction:     45 min Therapeutic Exercise:  [x] See flow sheet :   Rationale: increase ROM and increase strength to improve the patients ability to complete ADL/IADL tasks independently with use of L hand. With   [x] TE   [] TA   [] neuro   [] other: Patient Education: [x] Review HEP    [] Progressed/Changed HEP based on:   [] positioning   [] body mechanics   [] transfers   [] heat/ice application   [] Splint wear/care   [] Sensory re-education   [] scar management      [] other:              Pain Level (0-10 scale) post treatment: 0/10     ASSESSMENT/Changes in Function: Patient tolerated treatment session fairly well today. Patient reported minimal pain in L hand during therapeutic exercises. Patient tolerated increased reps with L UE therapeutic exercises. Patient will continue to benefit from skilled OT services to address ROM deficits and address strength deficits to attain remaining goals. [x]  See Plan of Care  []  See progress note/recertification  []  See Discharge Summary         Progress towards goals / Updated goals:  Short Term Goals: To be accomplished in 6 treatments:  1- Patient will increase L  strength by 10# to improve L hand function. 2- Patient will increase L pinch strength by 3# each direction to improve L hand function.    3- Patient will improve L 9 hole peg by 20 seconds to improve fine motor coordination. 4- Patient will increase L shoulder abduction and L wrist extension (AROM) by 10 degrees to improve independence with ADL/IADL tasks. 5- Patient will increase L 4th & 5th digit PIP/MCP flexion (AROM) by 5 degrees to improve independence with ADL/IADL tasks.      Long Term Goals: To be accomplished in 16 treatments:     1- Patient will increase L  strength by 20# to improve L hand function. 2- Patient will report performing ADL/IADL tasks independently. 3- Patient will be independent with HEP. 4-  Patient will increase L 4th & 5th digit PIP/MCP flexion (AROM) by 10 degrees to improve independence with ADL/IADL tasks.      PLAN  [x]  Upgrade activities as tolerated     [x]  Continue plan of care  []  Update interventions per flow sheet       []  Discharge due to:_  []  Other:_      GILL Ro, OTR/L 7/13/2022

## 2022-07-18 ENCOUNTER — TELEPHONE (OUTPATIENT)
Dept: NEUROLOGY | Age: 57
End: 2022-07-18

## 2022-07-18 ENCOUNTER — HOSPITAL ENCOUNTER (OUTPATIENT)
Dept: PHYSICAL THERAPY | Age: 57
Discharge: HOME | End: 2022-07-18
Payer: COMMERCIAL

## 2022-07-18 PROCEDURE — 97110 THERAPEUTIC EXERCISES: CPT

## 2022-07-18 PROCEDURE — 97016 VASOPNEUMATIC DEVICE THERAPY: CPT

## 2022-07-18 NOTE — PROGRESS NOTES
Left a Voice Message at Dr. Chip Butts regarding  Marco Core complaint of blurred vision from new medications. New medication per patient was folic acid and Atorvastin (40 mg). Patient stated that he has stopped taking the medications and was advised by therapist to contact MD due to recent CVA and blurred vision. Patient declined to contact MD office.

## 2022-07-18 NOTE — PROGRESS NOTES
PT DAILY TREATMENT NOTE 2-15    Patient Name: Doc Vergara  Date:2022  : 1965  [x]  Patient  Verified  Payor: Shannon Dominguez / Plan: Mayelin Wong / Product Type: HMO /    In time: 9:09  Out time: 10:03  Total Treatment Time (min): 47  Visit #:  5    Treatment Area: Cerebrovascular accident (CVA), unspecified mechanism ([facility] [address] [I63.9]  Cerebral ischemia [I67.82]  Left hemiparesis ([address] [G81.94]  Gait disorder [R26.9]    SUBJECTIVE  Pain Level (0-10 scale): 0/10  Any medication changes, allergies to medications, adverse drug reactions, diagnosis change, or new procedure performed?: [x] No    [] Yes (see summary sheet for update)  Subjective functional status/changes:     Pt saw OT first today. Pt c/o eyesight being fuzzy since starting new meds. New meds added to list in pt's paper chart by [facility]. OBJECTIVE    54 min Therapeutic Exercise:  [x] See flow sheet :   Rationale: increase ROM, increase strength and improve balance to improve the patients ability to improve functional mobility         With   [x] TE   [] TA   [] neuro   [] other: Patient Education: [x] Review HEP    [] Progressed/Changed HEP based on:   [] positioning   [] body mechanics   [] transfers   [] heat/ice application    [] other:      Pain Level (0-10 scale) post treatment: 0/10    ASSESSMENT/Changes in Function: Patient tolerated treatment fairly well. No c/o increased pain and no LOB. Patient will continue to benefit from skilled PT services to address functional mobility deficits, address ROM deficits and address strength deficits to attain remaining goals. [x]  See Plan of Care  []  See progress note/recertification  []  See Discharge Summary         Progress towards goals / Updated goals:  Short Term Goals: To be accomplished in 5 treatments. 1. Patient will demonstrate independence and compliance with HEP in order to assist with carryover from PT services.   2.   Patient will be able to ambulate for 20 minutes independently without fatigue or LOB to increase functional mobility. 3.   Patient will be able to squat down to  object off ground without lob to increase functional mobility. 4.   Patient will be able to navigate stairs with step over gait pattern and use of B UE without lob to increase functional mobility. 5.   Patient will increase TUG score to 13 seconds in order to ambulate with less falls risk.     Long Term Goals: To be accomplished in 10  treatments. 1.   Patient will demonstrate independence and compliance with HEP in order to assist with carryover from PT services. 2.   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 3.   Patient will be able to navigate stairs with step over gait pattern and use of 1 UE without lob to increase functional mobility. 4.   Patient will increase left hip flexion strength to 5/5 in order to improve gait quality.     PLAN  [x]  Upgrade activities as tolerated     [x]  Continue plan of care  []  Update interventions per flow sheet       []  Discharge due to:_  []  Other:_      Erlin Medrano PTA, LPTA 7/18/2022

## 2022-07-18 NOTE — PROGRESS NOTES
OT DAILY TREATMENT NOTE  3-16    Patient Name: Yvonne Hodge  Date:2022  : 1965  [x]  Patient  Verified  Payor: Dali Valencia / Plan: Clemencia Rich / Product Type: HMO /    In time:806  Out time: 903  Total Treatment Time (min): 62  Visit #: 4    Treatment Area: CVA (cerebrovascular accident) ([facility] [I63.9]    SUBJECTIVE  Pain Level (0-10 scale): 0/10  Any medication changes, allergies to medications, adverse drug reactions, diagnosis change, or new procedure performed?: [x] No    [] Yes (see summary sheet for update)  Subjective functional status/changes:   [] No changes reported  Patient stumbled upon arrival to rehab gym. Patient stated \"I think I got up too fast, I feel a little dizzy and my leg feels weak. \"     OBJECTIVE    Modality rationale: decrease edema and decrease pain to improve the patients ability to complete ADL/IADL tasks independently with use of L hand.     Min Type Additional Details    [] Estim:  []Unatt       []IFC  []Premod                        []Other:  []w/ice   []w/heat  Position:  Location:    [] Estim: []Att    []TENS instruct  []NMES                    []Other:  []w/US   []w/ice   []w/heat  Position:  Location:    []  Traction: [] Cervical       []Lumbar                       [] Prone          []Supine                       []Intermittent   []Continuous Lbs:  [] before manual  [] after manual    []  Ultrasound: []Continuous   [] Pulsed                           []1MHz   []3MHz W/cm2:  Location:    []  Iontophoresis with dexamethasone         Location: [] Take home patch   [] In clinic    []  Ice     []  heat  []  Ice massage  []  Laser   []  Anodyne Position:  Location:    []  Laser with stim  []  Other:  Position:  Location:   10 [x]  Vasopneumatic Device Pressure:       [x] lo [] med [] hi   Temperature: [x] lo [] med [] hi   [x] Skin assessment post-treatment:  [x]intact []redness- no adverse reaction    []redness - adverse reaction:     47 min Therapeutic Exercise:  [x] See flow sheet :   Rationale: increase ROM and increase strength to improve the patients ability to complete ADL/IADL tasks independently with use of L hand. With   [x] TE   [] TA   [] neuro   [] other: Patient Education: [x] Review HEP    [] Progressed/Changed HEP based on:   [] positioning   [] body mechanics   [] transfers   [] heat/ice application   [] Splint wear/care   [] Sensory re-education   [] scar management      [] other:                  Pain Level (0-10 scale) post treatment: 0/10    ASSESSMENT/Changes in Function: Patient tolerated treatment session well today. Patient demonstrated improvement with L hand fine motor coordination with grasping various coins from table utilizing pillow case under the coins. OTR checked patient's BP upon entry into rehab gym due to reporting increased dizziness; 159/88. Patient states that is usually his norm. Patient will continue to benefit from skilled OT services to address ROM deficits and address strength deficits to attain remaining goals. [x]  See Plan of Care  []  See progress note/recertification  []  See Discharge Summary         Progress towards goals / Updated goals:  Short Term Goals: To be accomplished in 6 treatments:  1- Patient will increase L  strength by 10# to improve L hand function. 2- Patient will increase L pinch strength by 3# each direction to improve L hand function.    3- Patient will improve L 9 hole peg by 20 seconds to improve fine motor coordination. 4- Patient will increase L shoulder abduction and L wrist extension (AROM) by 10 degrees to improve independence with ADL/IADL tasks. 5- Patient will increase L 4th & 5th digit PIP/MCP flexion (AROM) by 5 degrees to improve independence with ADL/IADL tasks.      Long Term Goals: To be accomplished in 16 treatments:     1- Patient will increase L  strength by 20# to improve L hand function. 2- Patient will report performing ADL/IADL tasks independently.   3- Patient will be independent with HEP.   4-  Patient will increase L 4th & 5th digit PIP/MCP flexion (AROM) by 10 degrees to improve independence with ADL/IADL tasks.     PLAN  [x]  Upgrade activities as tolerated     [x]  Continue plan of care  []  Update interventions per flow sheet       []  Discharge due to:_  []  Other:_      GILL Talley, OTR/L 7/18/2022

## 2022-07-18 NOTE — TELEPHONE ENCOUNTER
WILFRIDO Wong with [facility] called about pt's last appt. Pt is complaining of vision changes and drowziness since starting the Lipitor. Danica advised pt to call us and disclose issues but pt has not called yet. Just wanted Dr. Mary Connor to know.  Call with any questions [phone]

## 2022-07-19 NOTE — TELEPHONE ENCOUNTER
Called pt,verified pt with two pt identifiers, advised pt I had received a call from his PT that he is having side effects from his Lipitor. Pt advised he started the Lipitor about 1 week after his last visit 6/27/22. He said he took the Lipitor that morning and left his house. His vision went, he couldn't see anything. He could not make out people's faces or see in front of him. His balance was even more off than usual. He turned around and went back home. He had these side effects for 4 days. He had no confusion , slurred speech . He states this did not feel like one of his previous strokes. Pt notes his balance is back to baseline for him. His vision is better but still fuzzy at times. He has not taken the Lipitor since that 1 time. He only started the Folic Acid the same day. Advised pt to stay off of the medication and I will send message to  to advise further. Pt verbalized understanding.

## 2022-07-20 ENCOUNTER — HOSPITAL ENCOUNTER (OUTPATIENT)
Dept: PHYSICAL THERAPY | Age: 57
Discharge: HOME | End: 2022-07-20
Payer: COMMERCIAL

## 2022-07-20 PROCEDURE — 97016 VASOPNEUMATIC DEVICE THERAPY: CPT

## 2022-07-20 PROCEDURE — 97110 THERAPEUTIC EXERCISES: CPT

## 2022-07-20 NOTE — PROGRESS NOTES
OT DAILY TREATMENT NOTE  3-16    Patient Name: Jamil Serna  Date:2022  : 1965  [x]  Patient  Verified  Payor: [employer] / Plan: Chris Rankin / Product Type: HMO /    In time:807  Out time:900  Total Treatment Time (min): 48  Visit #: 5    Treatment Area: CVA (cerebrovascular accident) ([facility] [I63.9]    SUBJECTIVE  Pain Level (0-10 scale): 0/10  Any medication changes, allergies to medications, adverse drug reactions, diagnosis change, or new procedure performed?: [x] No    [] Yes (see summary sheet for update)  Subjective functional status/changes:   [] No changes reported  Patient reported \"I feel like I am not progressing much with my arm, I feel like it has just stopped making any progress. \"    OBJECTIVE    Modality rationale: decrease edema and decrease pain to improve the patients ability to complete ADL/IADL tasks independently with use of L hand.     Min Type Additional Details    [] Estim:  []Unatt       []IFC  []Premod                        []Other:  []w/ice   []w/heat  Position:  Location:    [] Estim: []Att    []TENS instruct  []NMES                    []Other:  []w/US   []w/ice   []w/heat  Position:  Location:    []  Traction: [] Cervical       []Lumbar                       [] Prone          []Supine                       []Intermittent   []Continuous Lbs:  [] before manual  [] after manual    []  Ultrasound: []Continuous   [] Pulsed                           []1MHz   []3MHz W/cm2:  Location:    []  Iontophoresis with dexamethasone         Location: [] Take home patch   [] In clinic    []  Ice     []  heat  []  Ice massage  []  Laser   []  Anodyne Position:  Location:    []  Laser with stim  []  Other:  Position:  Location:   10 [x]  Vasopneumatic Device Pressure:       [] lo [x] med [] hi   Temperature: [x] lo [] med [] hi   [x] Skin assessment post-treatment:  [x]intact []redness- no adverse reaction    []redness - adverse reaction:     43 min Therapeutic Exercise:  [x] See flow sheet :   Rationale: increase ROM and increase strength to improve the patients ability to complete ADL/IADL tasks independently with use of L hand. With   [x] TE   [] TA   [] neuro   [] other: Patient Education: [x] Review HEP    [] Progressed/Changed HEP based on:   [] positioning   [] body mechanics   [] transfers   [] heat/ice application   [] Splint wear/care   [] Sensory re-education   [] scar management      [] other:                Pain Level (0-10 scale) post treatment: 0/10    ASSESSMENT/Changes in Function: Patient tolerated treatment session well today. Patient utilized pillowcase under coins and pegs on table to perform FM coordination activity. Patient tolerated increased resistance with L  therapeutic exercise. Patient will continue to benefit from skilled OT services to address ROM deficits and address strength deficits to attain remaining goals. [x]  See Plan of Care  []  See progress note/recertification  []  See Discharge Summary         Progress towards goals / Updated goals:  Short Term Goals: To be accomplished in 6 treatments:  1- Patient will increase L  strength by 10# to improve L hand function. 2- Patient will increase L pinch strength by 3# each direction to improve L hand function. 3- Patient will improve L 9 hole peg by 20 seconds to improve fine motor coordination. 4- Patient will increase L shoulder abduction and L wrist extension (AROM) by 10 degrees to improve independence with ADL/IADL tasks. 5- Patient will increase L 4th & 5th digit PIP/MCP flexion (AROM) by 5 degrees to improve independence with ADL/IADL tasks. Long Term Goals: To be accomplished in 16 treatments:     1- Patient will increase L  strength by 20# to improve L hand function. 2- Patient will report performing ADL/IADL tasks independently. 3- Patient will be independent with HEP.   4-  Patient will increase L 4th & 5th digit PIP/MCP flexion (AROM) by 10 degrees to improve independence with ADL/IADL tasks.         PLAN  [x]  Upgrade activities as tolerated     [x]  Continue plan of care  []  Update interventions per flow sheet       []  Discharge due to:_  []  Other:_      GILL Vega, OTR/L 7/20/2022

## 2022-07-20 NOTE — PROGRESS NOTES
PT DAILY TREATMENT NOTE 2-15    Patient Name: Michelle Viera  Date:2022  : 1965  [x]  Patient  Verified  Payor: Holden Arroyo / Plan: Clemencia Angela / Product Type: HMO /    In time: 9:03  Out time: 9:53  Total Treatment Time (min): 50  Visit #:  6    Treatment Area: Cerebrovascular accident (CVA), unspecified mechanism ([facility] [address] [I63.9]  Cerebral ischemia [I67.82]  Left hemiparesis ([facility] [address] [G81.94]  Gait disorder [R26.9]    SUBJECTIVE  Pain Level (0-10 scale): 0/10  Any medication changes, allergies to medications, adverse drug reactions, diagnosis change, or new procedure performed?: [x] No    [] Yes (see summary sheet for update)  Subjective functional status/changes:     \"I'm alright. \"    OBJECTIVE    50 min Therapeutic Exercise:  [x] See flow sheet :   Rationale: increase ROM, increase strength, and improve balance to improve the patients ability to improve functional mobility          With   [x] TE   [] TA   [] neuro   [] other: Patient Education: [x] Review HEP    [] Progressed/Changed HEP based on:   [] positioning   [] body mechanics   [] transfers   [] heat/ice application    [] other:      Pain Level (0-10 scale) post treatment: 0/10    ASSESSMENT/Changes in Function: Patient tolerated treatment fairly well. Pt requires seated rest breaks due to fatigue. Reviewed HEP with pt with handout and copy placed in chart. Patient will continue to benefit from skilled PT services to address functional mobility deficits, address ROM deficits, and address strength deficits to attain remaining goals. [x]  See Plan of Care  []  See progress note/recertification  []  See Discharge Summary         Progress towards goals / Updated goals:  Short Term Goals: To be accomplished in 5 treatments. Patient will demonstrate independence and compliance with HEP in order to assist with carryover from PT services.   2.   Patient will be able to ambulate for 20 minutes independently without fatigue or LOB to increase functional mobility. 3.   Patient will be able to squat down to  object off ground without lob to increase functional mobility. 4.   Patient will be able to navigate stairs with step over gait pattern and use of B UE without lob to increase functional mobility. 5.   Patient will increase TUG score to 13 seconds in order to ambulate with less falls risk. Long Term Goals: To be accomplished in 10  treatments. 1.   Patient will demonstrate independence and compliance with HEP in order to assist with carryover from PT services. 2.   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 3.   Patient will be able to navigate stairs with step over gait pattern and use of 1 UE without lob to increase functional mobility. 4.   Patient will increase left hip flexion strength to 5/5 in order to improve gait quality.     PLAN  [x]  Upgrade activities as tolerated     [x]  Continue plan of care  []  Update interventions per flow sheet       []  Discharge due to:_  []  Other:_      Bill Carbone PTA, LPTA 7/20/2022

## 2022-07-25 ENCOUNTER — HOSPITAL ENCOUNTER (OUTPATIENT)
Dept: PHYSICAL THERAPY | Age: 57
Discharge: HOME | End: 2022-07-25
Payer: COMMERCIAL

## 2022-07-25 PROCEDURE — 97110 THERAPEUTIC EXERCISES: CPT

## 2022-07-25 NOTE — PROGRESS NOTES
OT DAILY TREATMENT NOTE  3-16    Patient Name: Mongolian Files  Date:2022  : 1965  [x]  Patient  Verified  Payor: Cassie Goodwin / Plan: Pato Lozada / Product Type: HMO /    In time:806  Out time:859  Total Treatment Time (min): 48  Visit #: 6    Treatment Area: CVA (cerebrovascular accident) ([place] [address].) [I63.9]    SUBJECTIVE  Pain Level (0-10 scale): 0/10  Any medication changes, allergies to medications, adverse drug reactions, diagnosis change, or new procedure performed?: [x] No    [] Yes (see summary sheet for update)  Subjective functional status/changes:   [] No changes reported  Patient reported \"I think I may of had another stroke. I can't move the right side of my mouth since last Wednesday. \"    OBJECTIVE      53 min Therapeutic Exercise:  [] See flow sheet :   Rationale: increase ROM and increase strength to improve the patients ability to complete ADL/IADL tasks independently with use of L hand. With   [x] TE   [] TA   [] neuro   [] other: Patient Education: [x] Review HEP    [] Progressed/Changed HEP based on:   [] positioning   [] body mechanics   [] transfers   [] heat/ice application   [] Splint wear/care   [] Sensory re-education   [] scar management      [] other:                  Pain Level (0-10 scale) post treatment: 1/10    ASSESSMENT/Changes in Function: Patient tolerated treatment session fairly well today. Patient arrived to therapy with new onset of R sided facial paralysis. Patient reports the R sided facial paralysis started last Wednesday evening (2022). Patient states he did not go get checked out at ED or contact his neurologist yet. OTR educated patient on importance of getting checked out asap with new onset of neurological symptoms. Patient reports he has been taking all of his medications, but has not checked his BP recently. OTR checked patient's BP prior to performing therapeutic exercises: 141/97. Patient reports no headache or dizziness.  Patient states he has no weakness on his R side, only in his face. OTR recommended patient contact his neurologist regarding new onset of R sided facial paralysis; patient verbalized understanding. Patient will continue to benefit from skilled OT services to address ROM deficits and address strength deficits to attain remaining goals. [x]  See Plan of Care  []  See progress note/recertification  []  See Discharge Summary         Progress towards goals / Updated goals:  Short Term Goals: To be accomplished in 6 treatments:  1- Patient will increase L  strength by 10# to improve L hand function. 2- Patient will increase L pinch strength by 3# each direction to improve L hand function. 3- Patient will improve L 9 hole peg by 20 seconds to improve fine motor coordination. 4- Patient will increase L shoulder abduction and L wrist extension (AROM) by 10 degrees to improve independence with ADL/IADL tasks. 5- Patient will increase L 4th & 5th digit PIP/MCP flexion (AROM) by 5 degrees to improve independence with ADL/IADL tasks. Long Term Goals: To be accomplished in 16 treatments:     1- Patient will increase L  strength by 20# to improve L hand function. 2- Patient will report performing ADL/IADL tasks independently. 3- Patient will be independent with HEP. 4-  Patient will increase L 4th & 5th digit PIP/MCP flexion (AROM) by 10 degrees to improve independence with ADL/IADL tasks.      PLAN  [x]  Upgrade activities as tolerated     [x]  Continue plan of care  []  Update interventions per flow sheet       []  Discharge due to:_  []  Other:_      GILL Ro, OTR/JIL 7/25/2022

## 2022-07-25 NOTE — PROGRESS NOTES
PT DAILY TREATMENT NOTE 2-15    Patient Name: Jamil Serna  Date:2022  : 1965  [x]  Patient  Verified  Payor: [employer] / Plan: Chris Rankin / Product Type: HMO /    In time:9:00  Out time:9:55  Total Treatment Time (min): 54  Visit #:  7    Treatment Area: Cerebrovascular accident (CVA), unspecified mechanism ([facility] [I63.9]  Cerebral ischemia [I67.82]  Left hemiparesis ([facility] [G81.94]  Gait disorder [R26.9]    SUBJECTIVE  Pain Level (0-10 scale): 0/10  Any medication changes, allergies to medications, adverse drug reactions, diagnosis change, or new procedure performed?: [x] No    [] Yes (see summary sheet for update)  Subjective functional status/changes:     \"I am feeling good today but something weird happened last Thursday. I noticed I was having trouble eating and drinking. I thought it was the can at first but then realized I couldn't open the R side of my mouth and my eye was kind of droopy. I am going to call my doctor after I am done here. \"    OBJECTIVE    55 min Therapeutic Exercise:  [x] See flow sheet :   Rationale: increase ROM, increase strength, improve coordination, and improve balance to improve the patient's ability to Improve balance and ability to perform ADL's                With   [x] TE   [] TA   [] neuro   [] other: Patient Education: [x] Review HEP    [] Progressed/Changed HEP based on:   [] positioning   [x] body mechanics   [] transfers   [x] heat/ice application    [x] other: Educated pt to see doctor about facial paralysis and how to better handle situation if it were to occur again. He was instructed to seek medical attention immediately if he thinks he is having another stroke. Other Objective/Functional Measures: BP at start of treatment 140/87, BP end of session 136/83     Pain Level (0-10 scale) post treatment: 0/10    ASSESSMENT/Changes in Function:   Patient tolerated treatment fairly well.  Pt entered the clinic with reports of facial paralysis that occurred on Thursday 7/21/22. He stated that he had no change in anything other than he was unable to open his mouth on the R side so he didn't think it was a big deal. He was educated on how to handle the situation in the future and to seek medical attention if he thinks he is having another stroke. The pt agreed and will be calling his doctor following treatment. Pt had no issues with lightheaded or dizziness during treatment. He did however, require rest breaks due to fatigue. BP was monitored throughout session and remained stable. Patient will continue to benefit from skilled PT services to modify and progress therapeutic interventions, address functional mobility deficits, address ROM deficits, address strength deficits, analyze and cue movement patterns, and address imbalance/dizziness to attain remaining goals. [x]  See Plan of Care  []  See progress note/recertification  []  See Discharge Summary         Progress towards goals / Updated goals:  Short Term Goals: To be accomplished in 5 treatments. Patient will demonstrate independence and compliance with HEP in order to assist with carryover from PT services. 2.   Patient will be able to ambulate for 20 minutes independently without fatigue or LOB to increase functional mobility. 3.   Patient will be able to squat down to  object off ground without lob to increase functional mobility. 4.   Patient will be able to navigate stairs with step over gait pattern and use of B UE without lob to increase functional mobility. 5.   Patient will increase TUG score to 13 seconds in order to ambulate with less falls risk. Long Term Goals: To be accomplished in 10  treatments. 1.   Patient will demonstrate independence and compliance with HEP in order to assist with carryover from PT services. 2.   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   3.   Patient will be able to navigate stairs with step over gait pattern and use of 1 UE without lob to increase functional mobility. 4.   Patient will increase left hip flexion strength to 5/5 in order to improve gait quality.     PLAN  [x]  Upgrade activities as tolerated     [x]  Continue plan of care  []  Update interventions per flow sheet       []  Discharge due to:_  []  Other:_      ELDER Martinez,  7/25/2022

## 2022-07-27 ENCOUNTER — OFFICE VISIT (OUTPATIENT)
Dept: NEUROLOGY | Age: 57
End: 2022-07-27
Payer: COMMERCIAL

## 2022-07-27 VITALS
WEIGHT: 238 LBS | SYSTOLIC BLOOD PRESSURE: 118 MMHG | DIASTOLIC BLOOD PRESSURE: 84 MMHG | HEIGHT: 67 IN | RESPIRATION RATE: 16 BRPM | OXYGEN SATURATION: 97 % | HEART RATE: 87 BPM | BODY MASS INDEX: 37.35 KG/M2 | TEMPERATURE: 98 F

## 2022-07-27 DIAGNOSIS — G81.94 LEFT HEMIPARESIS (HCC): ICD-10-CM

## 2022-07-27 DIAGNOSIS — G51.0 RIGHT-SIDED BELL'S PALSY: Primary | ICD-10-CM

## 2022-07-27 DIAGNOSIS — I63.9 CEREBROVASCULAR ACCIDENT (CVA), UNSPECIFIED MECHANISM (HCC): ICD-10-CM

## 2022-07-27 DIAGNOSIS — G51.0 FACIAL NERVE PALSY: ICD-10-CM

## 2022-07-27 PROCEDURE — 99214 OFFICE O/P EST MOD 30 MIN: CPT | Performed by: PSYCHIATRY & NEUROLOGY

## 2022-07-27 RX ORDER — ACETAMINOPHEN 500 MG
1 TABLET ORAL
Qty: 2 EACH | Refills: 1 | Status: SHIPPED | OUTPATIENT
Start: 2022-07-27

## 2022-07-27 RX ORDER — ASPIRIN 81 MG/1
81 TABLET ORAL DAILY
COMMUNITY

## 2022-07-27 NOTE — PROGRESS NOTES
Neurology Progress Note    NAME:  Paulino Dejesus   :   1965   MRN:   [id number]     Date/Time:  2022  Subjective:      Paulino Dejesus is a 65 y.o. male here today for follow-up for CVA and left-sided weakness. Patient says he woke up about 6 days prior to presentation, noticed that the right side of his face was droopy, was unable to close the right eye properly. He says when he drinks water dripped into the mouth. Patient says he was alarmed and went to a local emergency room and he was told to follow-up with his neurologist.  Patient denies headache, ear pain, nausea or vomiting  He denies loss of consciousness, dysphagia or odynophagia.   Review of Systems - General ROS: positive for  - fatigue and sleep disturbance  Psychological ROS: positive for - concentration difficulties and sleep disturbances  Ophthalmic ROS: positive for - decreased vision  ENT ROS: positive for - tinnitus, vertigo and visual changes  Allergy and Immunology ROS: negative  Hematological and Lymphatic ROS: negative  Endocrine ROS: negative  Respiratory ROS: no cough, shortness of breath, or wheezing  Cardiovascular ROS: no chest pain or dyspnea on exertion  Gastrointestinal ROS: no abdominal pain, change in bowel habits, or black or bloody stools  Genito-Urinary ROS: no dysuria, trouble voiding, or hematuria  Musculoskeletal ROS: positive for - gait disturbance, joint pain, joint stiffness, muscle pain and muscular weakness  Neurological ROS: positive for - dizziness, gait disturbance, impaired coordination/balance, numbness/tingling, visual changes and weakness  Dermatological ROS: negative  After examination, I told patient that he most likely has Bell's palsy or facial nerve palsy, however, will have to rule out brainstem lesion in view of the fact that patient had previous stroke  Will obtain MRI of the brain with and without a gadolinium  Patient advised to massage the right side of his face at least 3 times a day.  Lacri-Lube  Patient to go back to physical therapy  Medications reviewed:  Current Outpatient Medications   Medication Sig Dispense Refill    aspirin delayed-release 81 mg tablet Take 81 mg by mouth in the morning. Eye Patch misc 1 Patch by Does Not Apply route nightly. 2 Each 1    white petrolatum-mineral oiL (LACRILUBE S.O.P.) 56.8-42.5 % ointment Apply small amount to affected eye at bedtime and tape shut, to avoid eye drying out. Only use until eyelid starts to close fully. 3.5 g 1    metFORMIN (GLUCOPHAGE) 1,000 mg tablet Take 1 Tablet by mouth two (2) times daily (with meals). 60 Tablet 3    insulin nph-regular human rec (HUMULIN 70-30) 100 unit/mL (70-30) inpn 15 Units by SubCUTAneous route two (2) times daily (with meals). 18 Adjustable Dose Pre-filled Pen Syringe 1    Insulin Needles, Disposable, 31 gauge x 5/16\" ndle Use once/day 100 Pen Needle 5    glucose blood VI test strips (OneTouch Ultra Test) strip Check blood sugar in AM ( fasting ) and 2 hours after dinner. 100 Strip 11    Insulin Needles, Disposable, (Esther Pen Needle) 32 gauge x 5/32\" ndle 1 Units by SubCUTAneous route daily. 3 Pen Needle 6    Blood-Glucose Meter monitoring kit Please take blood sugar three times per day. 1 Kit 0    lancets (ONETOUCH DELICA LANCETS) 30 gauge misc Check blood sugar in AM ( fasting ) and 2 hours after dinner. 100 Lancet 11    glipiZIDE (GLUCOTROL) 10 mg tablet Take 1 tablet by mouth twice daily 180 Tablet 0    atorvastatin (LIPITOR) 40 mg tablet Take 1 Tablet by mouth daily. (Patient not taking: Reported on 7/27/2022) 30 Tablet 11    folic acid (FOLVITE) 1 mg tablet Take 1 Tablet by mouth daily.  (Patient not taking: Reported on 7/27/2022) 30 Tablet 11        Objective:   Vitals:  Vitals:    07/27/22 0918   BP: 118/84   Pulse: 87   Resp: 16   Temp: 98 °F (36.7 °C)   TempSrc: Temporal   SpO2: 97%   Weight: 238 lb (108 kg)   Height: 5' 7\" (1.702 m)   PainSc:   0 - No pain               Lab Data Reviewed:  Lab Results   Component Value Date/Time    WBC 7.2 02/09/2022 02:29 AM    HCT 38.8 02/09/2022 02:29 AM    HGB 13.1 02/09/2022 02:29 AM    PLATELET 683 (L) 91/46/3657 02:29 AM       Lab Results   Component Value Date/Time    Sodium 138 02/09/2022 02:29 AM    Potassium 4.0 02/09/2022 02:29 AM    Chloride 106 02/09/2022 02:29 AM    CO2 28 02/09/2022 02:29 AM    Glucose 165 (H) 02/09/2022 02:29 AM    BUN 9 02/09/2022 02:29 AM    Creatinine 0.67 (L) 02/09/2022 02:29 AM    Calcium 8.6 02/09/2022 02:29 AM       No components found for: TROPQUANT    No results found for: GEORGES      Lab Results   Component Value Date/Time    Hemoglobin A1c 10.4 (H) 02/09/2022 02:29 AM    Hemoglobin A1c (POC) 13.1 01/03/2022 08:57 AM    Hemoglobin A1c, External 9.2 01/14/2015 12:00 AM        Lab Results   Component Value Date/Time    Vitamin B12 353 08/10/2021 07:00 AM    Folate 17.6 08/10/2021 07:00 AM       No results found for: GEORGES, ANARX, ANAIGG, XBANA    Lab Results   Component Value Date/Time    Cholesterol, total 175 02/09/2022 02:29 AM    HDL Cholesterol 48 02/09/2022 02:29 AM    LDL, calculated 113.4 (H) 02/09/2022 02:29 AM    VLDL, calculated 13.6 02/09/2022 02:29 AM    Triglyceride 68 02/09/2022 02:29 AM    CHOL/HDL Ratio 3.6 02/09/2022 02:29 AM         CT Results (recent): MRI Results (recent):  Results from [place] encounter on 02/08/22    MRI BRAIN WO CONT    Narrative  EXAM: MRI BRAIN WO CONT    INDICATION: left weakness, assess for CVA    COMPARISON: 1/10/2022. CONTRAST: None. TECHNIQUE:  Multiplanar multisequence acquisition without contrast of the brain. FINDINGS:  The ventricles are normal in size and position. There is no acute hemorrhage,  extra-axial fluid collection, or mass effect. There is no cerebellar tonsillar  herniation. Expected arterial flow-voids are present. A moderate underlying  white matter disease is seen.  There is a new 1 cm infarct in the deep right  cerebral white matter. The previously described left caudate infarct has  resolved. The paranasal sinuses, mastoid air cells, and middle ears are clear. The orbital  contents are within normal limits. No significant osseous or scalp lesions are  identified. Impression  Acute deep white matter right parietal infarct. This result was called  by me at 0910 hours to ER  To Dr. Edita Lynne via [address]      IR Results (recent):  No results found for this or any previous visit. VAS/US Results (recent):  No results found for this or any previous visit. PHYSICAL EXAM:  General:    Alert, cooperative, no distress, appears stated age. Head:   Normocephalic, without obvious abnormality, atraumatic. Eyes:   Conjunctivae/corneas clear. PERRLA  Nose:  Nares normal. No drainage or sinus tenderness. Throat:    Lips, mucosa, and tongue normal.  No Thrush  Neck:  Supple, symmetrical,  no adenopathy, thyroid: non tender    no carotid bruit and no JVD. Back:    Symmetric,  No CVA tenderness. Lungs:   Clear to auscultation bilaterally. No Wheezing or Rhonchi. No rales. Chest wall:  No tenderness or deformity. No Accessory muscle use. Heart:   Regular rate and rhythm,  no murmur, rub or gallop. Abdomen:   Soft, non-tender. Not distended. Bowel sounds normal. No masses  Extremities: Extremities normal, atraumatic, No cyanosis. No edema. No clubbing  Skin:     Texture, turgor normal. No rashes or lesions. Not Jaundiced  Lymph nodes: Cervical, supraclavicular normal.  Psych:  Good insight. Not depressed. Anxious      NEUROLOGICAL EXAM:  Appearance: The patient is well developed, well nourished, provides a coherent history and is in no acute distress. Mental Status: Oriented to time, place and person. Mood and affect appropriate. Cranial Nerves:   Intact visual fields. Fundi are benign. CARLIE, EOM's full, no nystagmus, no ptosis. Facial sensation is normal. Corneal reflexes are intact.  Facial movement is asymmetric. Decreased right nasolabial fold  Hearing is normal bilaterally. Palate is midline with normal sternocleidomastoid and trapezius muscles are normal. Tongue is midline. Motor:  4/5 strength in left left upper and lower proximal and distal muscles. Normal bulk and tone. No fasciculations. Reflexes:   Deep tendon reflexes 2+/4 and symmetrical.   Sensory:   Normal to touch, pinprick and vibration. Gait:  Normal gait. Tremor:   No tremor noted. Cerebellar:  No cerebellar signs present. Neurovascular:  Normal heart sounds and regular rhythm, peripheral pulses intact, and no carotid bruits. Assesment  1. Right-sided Bell's palsy    - MRI BRAIN W WO CONT; Future    2. Facial nerve palsy    - MRI BRAIN W WO CONT; Future    3. Cerebrovascular accident (CVA), unspecified mechanism ([address]    - MRI BRAIN W WO CONT; Future    4. Left hemiparesis ([address]    - MRI BRAIN W WO CONT; Future    ___________________________________________________  PLAN: Medication and plan discussed with patient      ICD-10-CM ICD-9-CM    1. Right-sided Bell's palsy  G51.0 351.0 MRI BRAIN W WO CONT      2. Facial nerve palsy  G51.0 351.0 MRI BRAIN W WO CONT      3. Cerebrovascular accident (CVA), unspecified mechanism ([address]  I63.9 434.91 MRI BRAIN W WO CONT      4.  Left hemiparesis ([address]  G81.94 342.90 MRI BRAIN W WO CONT                  ___________________________________________________    Attending Physician: Rachel Pina MD

## 2022-07-27 NOTE — PROGRESS NOTES
1. Have you been to the ER, urgent care clinic since your last visit? Hospitalized since your last visit? No.    2. Have you seen or consulted any other health care providers outside of the [address] [place] [address] since your last visit? Include any pap smears or colon screening.    No.      Chief Complaint   Patient presents with    Cerebrovascular Accident     Seen June 27,22- rt side face feels as though it is paralyzed - did not go to ER- stopped Lipitor due to vision issues

## 2022-08-04 NOTE — TELEPHONE ENCOUNTER
You  Joseluis Crouch MD 2 weeks ago         Please advise, thanks. Message  Received: Today  Clive Li MD sent to Carina Spann LPN  Caller: Unspecified (2 weeks ago)  I am not sure if it is from Lipitor, but he can discontinue it         Noted. Pt came into office for visit. He was not taking the medication at this time. Will document and close out this encounter.

## 2022-08-08 ENCOUNTER — HOSPITAL ENCOUNTER (OUTPATIENT)
Dept: PHYSICAL THERAPY | Age: 57
Discharge: HOME | End: 2022-08-08
Payer: COMMERCIAL

## 2022-08-08 DIAGNOSIS — Z79.4 TYPE 2 DIABETES MELLITUS WITHOUT COMPLICATION, WITH LONG-TERM CURRENT USE OF INSULIN (HCC): ICD-10-CM

## 2022-08-08 DIAGNOSIS — E11.9 TYPE 2 DIABETES MELLITUS WITHOUT COMPLICATION, WITH LONG-TERM CURRENT USE OF INSULIN (HCC): ICD-10-CM

## 2022-08-08 PROCEDURE — 97110 THERAPEUTIC EXERCISES: CPT

## 2022-08-08 PROCEDURE — 97016 VASOPNEUMATIC DEVICE THERAPY: CPT

## 2022-08-08 RX ORDER — GLIPIZIDE 10 MG/1
TABLET ORAL
Qty: 180 TABLET | Refills: 0 | Status: SHIPPED | OUTPATIENT
Start: 2022-08-08

## 2022-08-08 NOTE — PROGRESS NOTES
OT DAILY TREATMENT NOTE  3-16    Patient Name: Doc Vergara  Date:2022  : 1965  [x]  Patient  Verified  Payor: Shannon Dominguez / Plan: Mayelin Wong / Product Type: HMO /    In time: 8:58  Out time: 9:59  Total Treatment Time (min): 61  Visit #: 7 Visit count could not be calculated. Make sure you are using a visit which is associated with an episode. Treatment Area: CVA (cerebrovascular accident) ([facility] [I63.9]    SUBJECTIVE  Pain Level (0-10 scale): 0/10  Any medication changes, allergies to medications, adverse drug reactions, diagnosis change, or new procedure performed?: [x] No    [] Yes (see summary sheet for update)  Subjective functional status/changes:   [] No changes reported  Patient reporting he was diagnosed with Bell's Palsy following last reported episode with his mouth.      OBJECTIVE    Modality rationale: decrease edema to improve the patients ability to reduce swelling to improve FM tasks   Min Type Additional Details    [] Estim:  []Unatt       []IFC  []Premod                        []Other:  []w/ice   []w/heat  Position:  Location:    [] Estim: []Att    []TENS instruct  []NMES                    []Other:  []w/US   []w/ice   []w/heat  Position:  Location:    []  Traction: [] Cervical       []Lumbar                       [] Prone          []Supine                       []Intermittent   []Continuous Lbs:  [] before manual  [] after manual    []  Ultrasound: []Continuous   [] Pulsed                           []1MHz   []3MHz W/cm2:  Location:    []  Iontophoresis with dexamethasone         Location: [] Take home patch   [] In clinic    []  Ice     []  heat  []  Ice massage  []  Laser   []  Anodyne Position:  Location:    []  Laser with stim  []  Other:  Position:  Location:   10 [x]  Vasopneumatic Device Pressure:       [x] lo [] med [] hi   Temperature: [x] lo [] med [] hi   [x] Skin assessment post-treatment:  [x]intact [x]redness- no adverse reaction    []redness - adverse reaction: 51 min Therapeutic Exercise:  [x] See flow sheet :   Rationale: increase ROM and increase strength to improve the patients ability to complete ADL/IADL tasks independently with use of L hand. With   [x] TE   [] TA   [] neuro   [] other: Patient Education: [x] Review HEP    [] Progressed/Changed HEP based on:   [] positioning   [] body mechanics   [] transfers   [] heat/ice application   [] Splint wear/care   [] Sensory re-education   [] scar management      [] other:               Pain Level (0-10 scale) post treatment: 0/10    ASSESSMENT/Changes in Function: Patient exhibits full L shld ROM, actively. Flexion is in slight abduction. Patient will continue to benefit from skilled OT services to address ROM deficits and address strength deficits to attain remaining goals. [x]  See Plan of Care  []  See progress note/recertification  []  See Discharge Summary         Progress towards goals / Updated goals: To be accomplished in 6 treatments:  1- Patient will increase L  strength by 10# to improve L hand function. 2- Patient will increase L pinch strength by 3# each direction to improve L hand function. 3- Patient will improve L 9 hole peg by 20 seconds to improve fine motor coordination. 4- Patient will increase L shoulder abduction and L wrist extension (AROM) by 10 degrees to improve independence with ADL/IADL tasks. 5- Patient will increase L 4th & 5th digit PIP/MCP flexion (AROM) by 5 degrees to improve independence with ADL/IADL tasks. Long Term Goals: To be accomplished in 16 treatments:     1- Patient will increase L  strength by 20# to improve L hand function. 2- Patient will report performing ADL/IADL tasks independently. 3- Patient will be independent with HEP. 4-  Patient will increase L 4th & 5th digit PIP/MCP flexion (AROM) by 10 degrees to improve independence with ADL/IADL tasks.      PLAN  [x]  Upgrade activities as tolerated     []  Continue plan of care  [] Update interventions per flow sheet       []  Discharge due to:_  []  Other:_      SENA Collazo 8/8/2022

## 2022-08-10 ENCOUNTER — HOSPITAL ENCOUNTER (OUTPATIENT)
Dept: PHYSICAL THERAPY | Age: 57
Discharge: HOME | End: 2022-08-10
Payer: COMMERCIAL

## 2022-08-10 PROCEDURE — 97110 THERAPEUTIC EXERCISES: CPT

## 2022-08-10 NOTE — PROGRESS NOTES
PT DAILY TREATMENT NOTE 2-15    Patient Name: Yudith Cavazos  Date:8/10/2022  : 1965  [x]  Patient  Verified  Payor: Fayetta Hamman / Plan: Lisa Zelaya / Product Type: HMO /    In time:9:05  Out time:10:02  Total Treatment Time (min): 62  Visit #: 8    Treatment Area: Cerebrovascular accident (CVA), unspecified mechanism ([facility] [address] [I63.9]  Cerebral ischemia [I67.82]  Left hemiparesis ([address] [G81.94]  Gait disorder [R26.9]    SUBJECTIVE  Pain Level (0-10 scale): 0/10  Any medication changes, allergies to medications, adverse drug reactions, diagnosis change, or new procedure performed?: [x] No    [] Yes (see summary sheet for update)  Subjective functional status/changes:     \"I twisted my knee while crawling under my house to fix a pipe leak the other day. It is getting a lot better now, but still a little sore. I just feel like I am not stable and cannot trust my knee. \"    OBJECTIVE    57 min Therapeutic Exercise:  [x] See flow sheet :   Rationale: increase ROM, increase strength, improve coordination, improve balance, and increase proprioception to improve the patients ability to ambulate with improved stability.            With   [x] TE   [] TA   [] neuro   [] other: Patient Education: [x] Review HEP    [] Progressed/Changed HEP based on:   [] positioning   [] body mechanics   [] transfers   [] heat/ice application    [] other:      Other Objective/Functional Measures:  Posture:  wfl   Gait and Functional Mobility:  Improved ambulation with slight imbalance at the knee with WB due to decreasec quad control    Palpation: n/a             Hip:   Strength AROM       Right Left Right Left     Flexion  Hip abduction  Hip extension  Hip adduction 5/5 4/5   4/5  4/5  4+/5 [facility] WFL   Knee:   Strength AROM       Right Left Right Left     Flexion 5/5 4+/5 [facility]     Extension 5/5 4+/5 [facility]   Ankle   Strength AROM       Right Left Right Left     Dorsiflexion  [facility]     Platarflexion  [facility] WFL   All ROM measurements are measured in degrees. Pain Level (0-10 scale) post treatment: 0/10    ASSESSMENT/Changes in Function:   Patient tolerated treatment very well today. We discussed his recent diagnosis with Bell's Palsy and that it is slowly improving. He notes overall increased strength, but still is demonstrating hip and knee weakness on the LLE. When ambulating he still has decreased quad control which decreases overall knee stability during ambulation increasing the patient's risk of falling. He is demonstrating improved stability on the stairs, but still has to focus on the task in hand or he will falter with a misstep causing some imbalances. We plan to continue working on strengthening and overall balance and coordination training. Patient will continue to benefit from skilled PT services to modify and progress therapeutic interventions, address functional mobility deficits, address strength deficits, analyze and cue movement patterns, address imbalance/dizziness, and instruct in home and community integration to attain remaining goals and return to high level functions required for return to work. [x]  See Plan of Care  []  See progress note/recertification  []  See Discharge Summary         Progress towards goals / Updated goals:  Short Term Goals: To be accomplished in 5 treatments. Patient will demonstrate independence and compliance with HEP in order to assist with carryover from PT services. MET  2. Patient will be able to ambulate for 20 minutes independently without fatigue or LOB to increase functional mobility. In Progress (15 min before getting tired)  3. Patient will be able to squat down to  object off ground without LOB to increase functional mobility. In Progress (must take extra time and use hand on leg for support)  4. Patient will be able to navigate stairs with step over gait pattern and use of B UE without LOB to increase functional mobility. MET  5.    Patient will increase TUG score to 13 seconds in order to ambulate with less falls risk. MET (11 sec)     Long Term Goals: To be accomplished in 10  treatments. 1.   Patient will demonstrate independence and compliance with HEP in order to assist with carryover from PT services. MET  2. 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 Not Met (was told 1 year out of work)  3. Patient will be able to navigate stairs with step over gait pattern and use of 1 UE without lob to increase functional mobility. MET (can do without UE, but uses 1 UE for caution)  4. Patient will increase left hip flexion strength to 5/5 in order to improve gait quality.  In Progress (4/5 in some areas)    PLAN  [x]  Upgrade activities as tolerated     [x]  Continue plan of care  []  Update interventions per flow sheet       []  Discharge due to:_  []  Other:_      Cathryn Beard, PT, DPT 8/10/2022

## 2022-08-10 NOTE — PROGRESS NOTES
[address]  [place], [place]  Ph: [phone]    Fax: [phone]    Progress Note    Name: Luis F Gaitan   : 1965   MD: Nevaeh Juarez MD       Treatment Diagnosis: Cerebrovascular accident (CVA), unspecified mechanism ([address] [I63.9]  Cerebral ischemia [I67.82]  Left hemiparesis ([address] [G81.94]  Gait disorder [R26.9]  Start of Care: 2022    Visits from Start of Care: 8  Missed Visits: 2    Summary of Care / Assessment / Recommendations:   Patient tolerated treatment very well today. We discussed his recent diagnosis with Bell's Palsy and that it is slowly improving. He notes overall increased strength, but still is demonstrating hip and knee weakness on the LLE. When ambulating he still has decreased quad control which decreases overall knee stability during ambulation increasing the patient's risk of falling. He is demonstrating improved stability on the stairs, but still has to focus on the task in hand or he will falter with a misstep causing some imbalances. We plan to continue working on strengthening and overall balance and coordination training. Patient will continue to benefit from skilled PT services to modify and progress therapeutic interventions, address functional mobility deficits, address strength deficits, analyze and cue movement patterns, address imbalance/dizziness, and instruct in home and community integration to attain remaining goals and return to high level functions required for return to work. Progress towards goals / Updated goals:  Short Term Goals: To be accomplished in 5 treatments. Patient will demonstrate independence and compliance with HEP in order to assist with carryover from PT services. MET  2. Patient will be able to ambulate for 20 minutes independently without fatigue or LOB to increase functional mobility. In Progress (15 min before getting tired)  3.    Patient will be able to squat down to pick up object off ground without LOB to increase functional mobility. In Progress (must take extra time and use hand on leg for support)  4. Patient will be able to navigate stairs with step over gait pattern and use of B UE without LOB to increase functional mobility. MET  5. Patient will increase TUG score to 13 seconds in order to ambulate with less falls risk. MET (11 sec)     Long Term Goals: To be accomplished in 10  treatments. 1.   Patient will demonstrate independence and compliance with HEP in order to assist with carryover from PT services. MET  2. 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 Not Met (was told 1 year out of work)  3. Patient will be able to navigate stairs with step over gait pattern and use of 1 UE without lob to increase functional mobility. MET (can do without UE, but uses 1 UE for caution)  4. Patient will increase left hip flexion strength to 5/5 in order to improve gait quality. In Progress (4/5 in some areas)    Frequency/Duration:  2 treatments per week, for 8 weeks. Jaqueline Guaman, PT, DPT 8/10/2022         ________________________________________________________________________  NOTE TO PHYSICIAN:  Please complete the following and fax to:  [facility]:   Fax: [phone]  . Retain this original for your records. If you are unable to process this request in 24 hours, please contact our office.        ____ I have read the above report and request that my patient continue therapy with the following changes/special instructions:  ____ I have read the above report and request that my patient be discharged from therapy    Physician's Signature:_________________ Date:___________Time:__________

## 2022-08-16 ENCOUNTER — HOSPITAL ENCOUNTER (OUTPATIENT)
Dept: PHYSICAL THERAPY | Age: 57
Discharge: HOME | End: 2022-08-16
Payer: COMMERCIAL

## 2022-08-16 PROCEDURE — 97110 THERAPEUTIC EXERCISES: CPT

## 2022-08-16 NOTE — PROGRESS NOTES
OT DAILY TREATMENT NOTE  3    Patient Name: Mindi Cruz  Date:2022  : 1965  [x]  Patient  Verified  Payor: Kalyn White / Plan: Greer Drew / Product Type: HMO /    In time: 1  Out time:1002  Total Treatment Time (min): 47  Visit #: 8    Treatment Area: CVA (cerebrovascular accident) ([facility] [I63.9]    SUBJECTIVE  Pain Level (0-10 scale): 0/10  Any medication changes, allergies to medications, adverse drug reactions, diagnosis change, or new procedure performed?: [x] No    [] Yes (see summary sheet for update)  Subjective functional status/changes:   [] No changes reported  Patient reported \"Neurologist told me to follow-up with him in another year. \"    OBJECTIVE    Modality rationale: decrease edema and decrease pain to improve the patients ability to complete ADL/IADL tasks independently with use of L hand.     Min Type Additional Details    [] Estim:  []Unatt       []IFC  []Premod                        []Other:  []w/ice   []w/heat  Position:  Location:    [] Estim: []Att    []TENS instruct  []NMES                    []Other:  []w/US   []w/ice   []w/heat  Position:  Location:    []  Traction: [] Cervical       []Lumbar                       [] Prone          []Supine                       []Intermittent   []Continuous Lbs:  [] before manual  [] after manual    []  Ultrasound: []Continuous   [] Pulsed                           []1MHz   []3MHz W/cm2:  Location:    []  Iontophoresis with dexamethasone         Location: [] Take home patch   [] In clinic   10 [x]  Ice     []  heat  []  Ice massage  []  Laser   []  Anodyne Position: Seated  Location: L hand    []  Laser with stim  []  Other:  Position:  Location:    []  Vasopneumatic Device Pressure:       [] lo [] med [] hi   Temperature: [] lo [] med [] hi   [x] Skin assessment post-treatment:  [x]intact []redness- no adverse reaction    []redness - adverse reaction:     43 min Therapeutic Exercise:  [x] See flow sheet :   Rationale: increase ROM and increase strength to improve the patients ability to complete ADL/IADL tasks independently with use of L hand. With   [x] TE   [] TA   [] neuro   [] other: Patient Education: [x] Review HEP    [] Progressed/Changed HEP based on:   [] positioning   [] body mechanics   [] transfers   [] heat/ice application   [] Splint wear/care   [] Sensory re-education   [] scar management      [] other:             Other Objective/Functional Measures:       9 hole peg test:  L- 81 sec        Strength: L UE                                     3 pt pinch            2 pt pinch         Lateral pinch  Left 46# 12# 11# 18#           Range of Motion: L UE       L UE AROM   Shoulder flexion  145   Shoulder abduction  145   Forearm supination  65   Wrist flexion  70   Wrist extension  52   Radial Dev    16   Ulnar Deviation   34        L 4th digit PIP flexion- 68  L 4th digit MCP flexion- 62  L 5th digit PIP flexion- 70  L 5th digit MCP flexion- 65        Pain Level (0-10 scale) post treatment: 0/10    ASSESSMENT/Changes in Function: Patient is making progress towards goals. Patient demonstrated improvements in L UE strength as noted by increasing L  strength from 37# to 46#, 3 pt pinch strength from 9# to 12#, 2 pt pinch strength from 9# to 11# and lateral pinch strength from 17# to 18# since the initial evaluation. Patient demonstrated improvements in L shoulder abduction by increasing ROM 7 degrees. Patient also demonstrated improvements in L wrist extension by increasing ROM 4 degrees and L wrist flexion by increasing ROM 5 degrees since the initial evaluation. Patient demonstrated improvements in L 4th & 5th digits PIP/MCP flexion by increasing ROM 4-8 degrees each. Patient states he continues to have difficulty grasping items and lifting heavy items with his L hand. Patient reports \"it is hard to do anything with this hand still. \" Patient reports he continues to require occasional assistance with IADL tasks.  Patient will continue to benefit from skilled OT services to address ROM deficits and address strength deficits to attain remaining goals. []  See Plan of Care  [x]  See progress note/recertification  []  See Discharge Summary         Progress towards goals / Updated goals:  Short Term Goals: To be accomplished in 6 treatments:  1- Patient will increase L  strength by 10# to improve L hand function. -Partially Met  2- Patient will increase L pinch strength by 3# each direction to improve L hand function.  -Partially Met  3- Patient will improve L 9 hole peg by 20 seconds to improve fine motor coordination. -Not Met  4- Patient will increase L shoulder abduction and L wrist extension (AROM) by 10 degrees to improve independence with ADL/IADL tasks. -Partially Met  5- Patient will increase L 4th & 5th digit PIP/MCP flexion (AROM) by 5 degrees to improve independence with ADL/IADL tasks. -Partially Met     Long Term Goals: To be accomplished in 16 treatments:     1- Patient will increase L  strength by 20# to improve L hand function. -Partially Met  2- Patient will report performing ADL/IADL tasks independently.  -Partially Met  3- Patient will be independent with HEP.  -Met  4-  Patient will increase L 4th & 5th digit PIP/MCP flexion (AROM) by 10 degrees to improve independence with ADL/IADL tasks.  -Partially Met    PLAN  [x]  Upgrade activities as tolerated     [x]  Continue plan of care  []  Update interventions per flow sheet       []  Discharge due to:_  []  Other:_      GILL Eason, [facility], 8/16/2022

## 2022-08-16 NOTE — PROGRESS NOTES
[address]  [place], [place]  Ph: [phone]    Fax: [phone]    Progress Note    Name: Ruben Zhang   : 1965   MD: Will Opitz, MD       Treatment Diagnosis: CVA (cerebrovascular accident) [facility]) [I63.9]  Start of Care: 2022    Visits from Start of Care: 8  Missed Visits: 2    Summary of Care / Assessment / Recommendations: Patient is making progress towards goals. Patient demonstrated improvements in L UE strength as noted by increasing L  strength from 37# to 46#, 3 pt pinch strength from 9# to 12#, 2 pt pinch strength from 9# to 11# and lateral pinch strength from 17# to 18# since the initial evaluation. Patient demonstrated improvements in L shoulder abduction by increasing ROM 7 degrees. Patient also demonstrated improvements in L wrist extension by increasing ROM 4 degrees and L wrist flexion by increasing ROM 5 degrees since the initial evaluation. Patient demonstrated improvements in L 4th & 5th digits PIP/MCP flexion by increasing ROM 4-8 degrees each. Patient states he continues to have difficulty grasping items and lifting heavy items with his L hand. Patient reports \"it is hard to do anything with this hand still. \" Patient reports he continues to require occasional assistance with IADL tasks. Patient will continue to benefit from skilled OT services to address ROM deficits and address strength deficits to attain remaining goals. Progress Toward Goals:  Short Term Goals:  To be accomplished in 6 treatments:  1- Patient will increase L  strength by 10# to improve L hand function. -Partially Met  2- Patient will increase L pinch strength by 3# each direction to improve L hand function.  -Partially Met  3- Patient will improve L 9 hole peg by 20 seconds to improve fine motor coordination. -Not Met  4- Patient will increase L shoulder abduction and L wrist extension (AROM) by 10 degrees to improve independence with ADL/IADL tasks. -Partially Met  5- Patient will increase L 4th & 5th digit PIP/MCP flexion (AROM) by 5 degrees to improve independence with ADL/IADL tasks. -Partially Met     Long Term Goals: To be accomplished in 16 treatments:     1- Patient will increase L  strength by 20# to improve L hand function. -Partially Met  2- Patient will report performing ADL/IADL tasks independently.  -Partially Met  3- Patient will be independent with HEP. -Met  4-  Patient will increase L 4th & 5th digit PIP/MCP flexion (AROM) by 10 degrees to improve independence with ADL/IADL tasks.  -Partially Met       Frequency/Duration:  1 treatment per week, for 8 weeks. GILL Hill, [facility], 8/16/2022         ________________________________________________________________________  NOTE TO PHYSICIAN:  Please complete the following and fax to:  [facility]:   Fax: [phone]  . Retain this original for your records. If you are unable to process this request in 24 hours, please contact our office.        ____ I have read the above report and request that my patient continue therapy with the following changes/special instructions:  ____ I have read the above report and request that my patient be discharged from therapy    Physician's Signature:_________________ Date:___________Time:__________

## 2022-08-18 ENCOUNTER — HOSPITAL ENCOUNTER (OUTPATIENT)
Dept: PHYSICAL THERAPY | Age: 57
Discharge: HOME | End: 2022-08-18
Payer: COMMERCIAL

## 2022-08-18 PROCEDURE — 97110 THERAPEUTIC EXERCISES: CPT

## 2022-08-18 NOTE — PROGRESS NOTES
PT DAILY TREATMENT NOTE 2-15    Patient Name: Ariel Youngblood  Date:2022  : 1965  [x]  Patient  Verified  Payor: Nevaeh Lara / Plan: Marta Zuniga / Product Type: HMO /    In time: 6356  Out time: 959  Total Treatment Time (min): 50  Visit #: 9    Treatment Area: Cerebrovascular accident (CVA), unspecified mechanism ([facility] [address] [I63.9]  Cerebral ischemia [I67.82]  Left hemiparesis ([address] [G81.94]  Gait disorder [R26.9]    SUBJECTIVE  Pain Level (0-10 scale): 0/10  Any medication changes, allergies to medications, adverse drug reactions, diagnosis change, or new procedure performed?: [x] No    [] Yes (see summary sheet for update)  Subjective functional status/changes: \"The hand really bothers me the most.\"    OBJECTIVE    50 min Therapeutic Exercise:  [x] See flow sheet :   Rationale: increase ROM, increase strength, improve coordination, improve balance, and increase proprioception to improve the patients ability to ambulate with improved stability. With   [x] TE   [] TA   [] neuro   [] other: Patient Education: [x] Review HEP    [] Progressed/Changed HEP based on:   [] positioning   [] body mechanics   [] transfers   [] heat/ice application    [] other:      Other Objective/Functional Measures: Addition of Apollo leg weights and stair taps today to increase LE strength, balance, proprioception, and endurance. Pain Level (0-10 scale) post treatment: 0/10    ASSESSMENT/Changes in Function:   Patient tolerated treatment very well today. Cues required with L LE knee extension to avoid hyperextension, and to complete exercises in slow, controled range. Pt able to tolerate increases well, however continues with L LE weakness and difficulty with proprioception at times.    Patient will continue to benefit from skilled PT services to modify and progress therapeutic interventions, address functional mobility deficits, address strength deficits, analyze and cue movement patterns, address imbalance/dizziness, and instruct in home and community integration to attain remaining goals and return to high level functions required for return to work. [x]  See Plan of Care  []  See progress note/recertification  []  See Discharge Summary         Progress towards goals / Updated goals:  Short Term Goals: To be accomplished in 5 treatments. Patient will demonstrate independence and compliance with HEP in order to assist with carryover from PT services. MET  2. Patient will be able to ambulate for 20 minutes independently without fatigue or LOB to increase functional mobility. In Progress (15 min before getting tired)  3. Patient will be able to squat down to  object off ground without LOB to increase functional mobility. In Progress (must take extra time and use hand on leg for support)  4. Patient will be able to navigate stairs with step over gait pattern and use of B UE without LOB to increase functional mobility. MET  5. Patient will increase TUG score to 13 seconds in order to ambulate with less falls risk. MET (11 sec)     Long Term Goals: To be accomplished in 10  treatments. 1.   Patient will demonstrate independence and compliance with HEP in order to assist with carryover from PT services. MET  2. 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 Not Met (was told 1 year out of work)  3. Patient will be able to navigate stairs with step over gait pattern and use of 1 UE without lob to increase functional mobility. MET (can do without UE, but uses 1 UE for caution)  4. Patient will increase left hip flexion strength to 5/5 in order to improve gait quality.  In Progress (4/5 in some areas)    PLAN  [x]  Upgrade activities as tolerated     [x]  Continue plan of care  []  Update interventions per flow sheet       []  Discharge due to:_  []  Other:_      Lisa Pina, PT, DPT 8/18/2022

## 2022-08-30 ENCOUNTER — HOSPITAL ENCOUNTER (OUTPATIENT)
Dept: PHYSICAL THERAPY | Age: 57
Discharge: HOME | End: 2022-08-30
Payer: COMMERCIAL

## 2022-08-30 PROCEDURE — 97110 THERAPEUTIC EXERCISES: CPT

## 2022-08-30 PROCEDURE — 97016 VASOPNEUMATIC DEVICE THERAPY: CPT

## 2022-08-30 NOTE — PROGRESS NOTES
OT DAILY TREATMENT NOTE  3-16    Patient Name: Becky Fried  Date:2022  : 1965  [x]  Patient  Verified  Payor: Annabelle Mccartney / Plan: Mirza Gilmore / Product Type: HMO /    In time: 900  Out time: 1000  Total Treatment Time (min): 60  Visit #: 9 Visit count could not be calculated. Make sure you are using a visit which is associated with an episode. Treatment Area: CVA (cerebrovascular accident) ([facility] [I63.9]    SUBJECTIVE  Pain Level (0-10 scale): 0/10  Any medication changes, allergies to medications, adverse drug reactions, diagnosis change, or new procedure performed?: [x] No    [] Yes (see summary sheet for update)  Subjective functional status/changes:   [] No changes reported  \"I'm a lot better, I still can't make a tight fist\". OBJECTIVE    Modality rationale: decrease edema to improve the patients ability to reduce swelling to improve FM tasks.    Min Type Additional Details    [] Estim:  []Unatt       []IFC  []Premod                        []Other:  []w/ice   []w/heat  Position:  Location:    [] Estim: []Att    []TENS instruct  []NMES                    []Other:  []w/US   []w/ice   []w/heat  Position:  Location:    []  Traction: [] Cervical       []Lumbar                       [] Prone          []Supine                       []Intermittent   []Continuous Lbs:  [] before manual  [] after manual    []  Ultrasound: []Continuous   [] Pulsed                           []1MHz   []3MHz W/cm2:  Location:    []  Iontophoresis with dexamethasone         Location: [] Take home patch   [] In clinic    []  Ice     []  heat  []  Ice massage  []  Laser   []  Anodyne Position:  Location:    []  Laser with stim  []  Other:  Position:  Location:   10 []  Vasopneumatic Device Pressure:       [x] lo [] med [] hi   Temperature: [x] lo [] med [] hi   [x] Skin assessment post-treatment:  [x]intact []redness- no adverse reaction    []redness - adverse reaction:     50 min Therapeutic Exercise:  [x] See flow sheet :   Rationale: increase ROM and increase strength to improve the patients ability to complete ADL/IADL tasks independently with use of L hand. With   [x] TE   [] TA   [] neuro   [] other: Patient Education: [x] Review HEP    [] Progressed/Changed HEP based on:   [] positioning   [] body mechanics   [] transfers   [] heat/ice application   [] Splint wear/care   [] Sensory re-education   [] scar management      [] other:         Pain Level (0-10 scale) post treatment: 0/10    ASSESSMENT/Changes in Function: Fine motor tasks remain challenging. Patient struggles with picking up coins and pegs off the table. Patient will continue to benefit from skilled OT services to address ROM deficits and address strength deficits to attain remaining goals. [x]  See Plan of Care  []  See progress note/recertification  []  See Discharge Summary         Progress towards goals / Updated goals: To be accomplished in 6 treatments:  1- Patient will increase L  strength by 10# to improve L hand function. 2- Patient will increase L pinch strength by 3# each direction to improve L hand function. 3- Patient will improve L 9 hole peg by 20 seconds to improve fine motor coordination. 4- Patient will increase L shoulder abduction and L wrist extension (AROM) by 10 degrees to improve independence with ADL/IADL tasks. 5- Patient will increase L 4th & 5th digit PIP/MCP flexion (AROM) by 5 degrees to improve independence with ADL/IADL tasks. Long Term Goals: To be accomplished in 16 treatments:     1- Patient will increase L  strength by 20# to improve L hand function. 2- Patient will report performing ADL/IADL tasks independently. 3- Patient will be independent with HEP. 4-  Patient will increase L 4th & 5th digit PIP/MCP flexion (AROM) by 10 degrees to improve independence with ADL/IADL tasks.      PLAN  [x]  Upgrade activities as tolerated     []  Continue plan of care  []  Update interventions per flow sheet       []  Discharge due to:_  []  Other:_      SENA Willard 8/30/2022

## 2022-09-01 ENCOUNTER — HOSPITAL ENCOUNTER (OUTPATIENT)
Dept: PHYSICAL THERAPY | Age: 57
Discharge: HOME | End: 2022-09-01
Payer: COMMERCIAL

## 2022-09-01 PROCEDURE — 97110 THERAPEUTIC EXERCISES: CPT

## 2022-09-01 PROCEDURE — 97112 NEUROMUSCULAR REEDUCATION: CPT

## 2022-09-01 NOTE — PROGRESS NOTES
PT DAILY TREATMENT NOTE 2-15    Patient Name: Matilde Durand  Date:2022  : 1965  [x]  Patient  Verified  Payor: Collette Greenberg / Plan: Ty Vizcaino / Product Type: HMO /    In time:853 am  Out time:950 am  Total Treatment Time (min): 62  Visit #:  10    Treatment Area: Cerebrovascular accident (CVA), unspecified mechanism ([facility] [I63.9]  Cerebral ischemia [I67.82]  Left hemiparesis ([address] [G81.94]  Gait disorder [R26.9]    SUBJECTIVE  Pain Level (0-10 scale): 0/10  Any medication changes, allergies to medications, adverse drug reactions, diagnosis change, or new procedure performed?: [x] No    [] Yes (see summary sheet for update)  Subjective functional status/changes: \"Pt reports no issue today. \"    OBJECTIVE    45 min Therapeutic Exercise:  [x] See flow sheet :   Rationale: increase ROM, increase strength, and improve coordination to improve the patients ability to increase functional strength for L LE to coordinate and balance      12 min Neuromuscular Re-education:  [x]  See flow sheet :   Rationale: improve coordination, improve balance, and increase proprioception  to improve the patients ability to increase LLE coordination, balance and movement control. With   [] TE   [] TA   [] neuro   [] other: Patient Education: [x] Review HEP    [] Progressed/Changed HEP based on:   [] positioning   [] body mechanics   [] transfers   [] heat/ice application    [] other:        Pain Level (0-10 scale) post treatment: 0/10    ASSESSMENT/Changes in Function:   Patient tolerated treatment working on strength, stretching and balance static dynamic and coordinated movement. Pt did well with all activities although he did have some issue with weakness when increase use of adduction with leg control motion.    Patient will continue to benefit from skilled PT services to modify and progress therapeutic interventions, address functional mobility deficits, address ROM deficits, address strength deficits, analyze and cue movement patterns, analyze and modify body mechanics/ergonomics, assess and modify postural abnormalities, and address imbalance/dizziness to attain remaining goals. [x]  See Plan of Care  []  See progress note/recertification  []  See Discharge Summary         Progress towards goals / Updated goals:  Short Term Goals: To be accomplished in 5 treatments. Patient will demonstrate independence and compliance with HEP in order to assist with carryover from PT services. MET  2. Patient will be able to ambulate for 20 minutes independently without fatigue or LOB to increase functional mobility. In Progress (15 min before getting tired)  3. Patient will be able to squat down to  object off ground without LOB to increase functional mobility. In Progress (must take extra time and use hand on leg for support)  4. Patient will be able to navigate stairs with step over gait pattern and use of B UE without LOB to increase functional mobility. MET  5. Patient will increase TUG score to 13 seconds in order to ambulate with less falls risk. MET (11 sec)     Long Term Goals: To be accomplished in 10  treatments. 1.   Patient will demonstrate independence and compliance with HEP in order to assist with carryover from PT services. MET  2. 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 Not Met (was told 1 year out of work)  3. Patient will be able to navigate stairs with step over gait pattern and use of 1 UE without lob to increase functional mobility. MET (can do without UE, but uses 1 UE for caution)  4. Patient will increase left hip flexion strength to 5/5 in order to improve gait quality.  In Progress (4/5 in some areas)       PLAN  [x]  Upgrade activities as tolerated     [x]  Continue plan of care  []  Update interventions per flow sheet       []  Discharge due to:_  []  Other:_ Jeanne Abreu PTA, LPTA 9/1/2022

## 2022-09-02 ENCOUNTER — DOCUMENTATION ONLY (OUTPATIENT)
Dept: NEUROLOGY | Age: 57
End: 2022-09-02

## 2022-09-02 NOTE — PROGRESS NOTES
Faxed signed copy of OT therapy continuance to [facility] @ [phone]. Confirmation received.

## 2022-09-06 ENCOUNTER — TRANSCRIBE ORDER (OUTPATIENT)
Dept: REGISTRATION | Age: 57
End: 2022-09-06

## 2022-09-06 DIAGNOSIS — G51.0 BELL'S PALSY: Primary | ICD-10-CM

## 2022-09-08 ENCOUNTER — HOSPITAL ENCOUNTER (OUTPATIENT)
Dept: PHYSICAL THERAPY | Age: 57
Discharge: HOME | End: 2022-09-08
Payer: COMMERCIAL

## 2022-09-08 PROCEDURE — 97112 NEUROMUSCULAR REEDUCATION: CPT

## 2022-09-08 PROCEDURE — 97110 THERAPEUTIC EXERCISES: CPT

## 2022-09-08 NOTE — PROGRESS NOTES
PT DAILY TREATMENT NOTE 2-15    Patient Name: Yuko Whitman  Date:2022  : 1965  [x]  Patient  Verified  Payor: Orlin Kaba / Plan: Alberta Stameenaer / Product Type: HMO /    In time:9:55  Out time:10:49  Total Treatment Time (min): 47  Visit #:  11    Treatment Area: Cerebrovascular accident (CVA), unspecified mechanism ([facility] [I63.9]  Cerebral ischemia [I67.82]  Left hemiparesis ([facility] [G81.94]  Gait disorder [R26.9]    SUBJECTIVE  Pain Level (0-10 scale): 0/10  Any medication changes, allergies to medications, adverse drug reactions, diagnosis change, or new procedure performed?: [x] No    [] Yes (see summary sheet for update)  Subjective functional status/changes:     \"I am alright, but feel like I am not making as much progress lately. \"    OBJECTIVE    30 min Therapeutic Exercise:  [x] See flow sheet :   Rationale: increase ROM, increase strength, improve coordination, and improve balance to improve the patients ability to ambulate and return to work related tasks. 24 min Neuromuscular Re-education:  [x]  See flow sheet :   Rationale: increase strength, improve coordination, and improve balance  to improve the patients ability to ambulate with decreased risk of falling. With   [x] TE   [] TA   [] neuro   [] other: Patient Education: [x] Review HEP    [] Progressed/Changed HEP based on:   [] positioning   [] body mechanics   [] transfers   [] heat/ice application    [] other:      Other Objective/Functional Measures: cone taps and step overs     Pain Level (0-10 scale) post treatment: 0/10    ASSESSMENT/Changes in Function:   Patient tolerated treatment very well today. The balance exercises were frustrating to the patient due to being very challenging. He is still weak and lacks full control of his LLE, so we will really focus on balance and single leg strengthening tasks in upcoming visits.  Patient will continue to benefit from skilled PT services to modify and progress therapeutic interventions, address functional mobility deficits, address ROM deficits, address strength deficits, analyze and address soft tissue restrictions, analyze and modify body mechanics/ergonomics, address imbalance/dizziness, and instruct in home and community integration to attain remaining goals. [x]  See Plan of Care  []  See progress note/recertification  []  See Discharge Summary         Progress towards goals / Updated goals:  Short Term Goals: To be accomplished in 5 treatments. Patient will demonstrate independence and compliance with HEP in order to assist with carryover from PT services. MET  2. Patient will be able to ambulate for 20 minutes independently without fatigue or LOB to increase functional mobility. In Progress (15 min before getting tired)  3. Patient will be able to squat down to  object off ground without LOB to increase functional mobility. In Progress (must take extra time and use hand on leg for support)  4. Patient will be able to navigate stairs with step over gait pattern and use of B UE without LOB to increase functional mobility. MET  5. Patient will increase TUG score to 13 seconds in order to ambulate with less falls risk. MET (11 sec)     Long Term Goals: To be accomplished in 10  treatments. 1.   Patient will demonstrate independence and compliance with HEP in order to assist with carryover from PT services. MET  2. 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 Not Met (was told 1 year out of work)  3. Patient will be able to navigate stairs with step over gait pattern and use of 1 UE without lob to increase functional mobility. MET (can do without UE, but uses 1 UE for caution)  4. Patient will increase left hip flexion strength to 5/5 in order to improve gait quality.  In Progress (4/5 in some areas)    PLAN  [x]  Upgrade activities as tolerated     [x] Continue plan of care  []  Update interventions per flow sheet       []  Discharge due to:_  []  Other:_      Ganesh Basilio, PT, DPT 9/8/2022

## 2022-09-13 ENCOUNTER — APPOINTMENT (OUTPATIENT)
Dept: PHYSICAL THERAPY | Age: 57
End: 2022-09-13
Payer: COMMERCIAL

## 2022-09-15 ENCOUNTER — HOSPITAL ENCOUNTER (OUTPATIENT)
Dept: PHYSICAL THERAPY | Age: 57
Discharge: HOME | End: 2022-09-15
Payer: COMMERCIAL

## 2022-09-15 PROCEDURE — 97110 THERAPEUTIC EXERCISES: CPT

## 2022-09-15 NOTE — PROGRESS NOTES
PT DAILY TREATMENT NOTE 2-15    Patient Name: Mahesh Longoria  Date:9/15/2022  : 1965  [x]  Patient  Verified  Payor: Marianne Loza / Plan: Bolivar Flood / Product Type: HMO /    In time:8:54  Out time:9:53  Total Treatment Time (min): 61  Visit #:  12    Treatment Area: Cerebrovascular accident (CVA), unspecified mechanism ([facility] [facility] [I63.9]  Cerebral ischemia [I67.82]  Left hemiparesis ([facility] [address] [facility] [G81.94]  Gait disorder [R26.9]    SUBJECTIVE  Pain Level (0-10 scale): 0/10  Any medication changes, allergies to medications, adverse drug reactions, diagnosis change, or new procedure performed?: [x] No    [] Yes (see summary sheet for update)  Subjective functional status/changes:     \"I'm not having any problems. .\"    OBJECTIVE      59 min Therapeutic Exercise:  [x] See flow sheet :   Rationale: increase ROM, increase strength, improve coordination, and improve balance to improve the patients ability to attain and maintain highest practicable functional capacity. With   [] TE   [] TA   [] neuro   [] other: Patient Education: [x] Review HEP    [] Progressed/Changed HEP based on:   [] positioning   [] body mechanics   [] transfers   [] heat/ice application    [] other:      Other Objective/Functional Measures: N/A     Pain Level (0-10 scale) post treatment: 0/10    ASSESSMENT/Changes in Function:   Patient tolerated treatment well without complaint. Demonstrated frequent incoordination with cone step over activites, occasionally knocking cones over with LLE in attempt to step over and plant foot. Demonstrated no LOB in LLE static unilateral stance activity with one UE support, however was unable to perform activity with no UE support.   Patient will continue to benefit from skilled PT services to modify and progress therapeutic interventions, address functional mobility deficits, address ROM deficits, address strength deficits, analyze and cue movement patterns, analyze and modify body mechanics/ergonomics, and assess and modify postural abnormalities to attain remaining goals. [x]  See Plan of Care  []  See progress note/recertification  []  See Discharge Summary         Progress towards goals / Updated goals:  Short Term Goals: To be accomplished in 5 treatments. Patient will demonstrate independence and compliance with HEP in order to assist with carryover from PT services. MET  2. Patient will be able to ambulate for 20 minutes independently without fatigue or LOB to increase functional mobility. In Progress (15 min before getting tired)  3. Patient will be able to squat down to  object off ground without LOB to increase functional mobility. In Progress (must take extra time and use hand on leg for support)  4. Patient will be able to navigate stairs with step over gait pattern and use of B UE without LOB to increase functional mobility. MET  5. Patient will increase TUG score to 13 seconds in order to ambulate with less falls risk. MET (11 sec)     Long Term Goals: To be accomplished in 10  treatments. 1.   Patient will demonstrate independence and compliance with HEP in order to assist with carryover from PT services. MET  2. 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 Not Met (was told 1 year out of work)  3. Patient will be able to navigate stairs with step over gait pattern and use of 1 UE without lob to increase functional mobility. MET (can do without UE, but uses 1 UE for caution)  4. Patient will increase left hip flexion strength to 5/5 in order to improve gait quality.  In Progress (4/5 in some areas)       PLAN  [x]  Upgrade activities as tolerated     [x]  Continue plan of care  []  Update interventions per flow sheet       []  Discharge due to:_  []  Other:_      Finn Galvez PTA, L.P.T.A. 9/15/2022

## 2022-09-20 ENCOUNTER — HOSPITAL ENCOUNTER (OUTPATIENT)
Dept: PHYSICAL THERAPY | Age: 57
Discharge: HOME | End: 2022-09-20
Payer: COMMERCIAL

## 2022-09-20 PROCEDURE — 97016 VASOPNEUMATIC DEVICE THERAPY: CPT

## 2022-09-20 PROCEDURE — 97530 THERAPEUTIC ACTIVITIES: CPT

## 2022-09-20 PROCEDURE — 97110 THERAPEUTIC EXERCISES: CPT

## 2022-09-20 NOTE — PROGRESS NOTES
[address]  [place], [place] [address] [place]  Ph: [phone]    Fax: [phone]    Progress Note    Name: Mell Ballard   : 1965   MD: Tori Bansal MD       Treatment Diagnosis: CVA (cerebrovascular accident) [facility]) [I63.9]  Start of Care: 2022    Visits from Start of Care: 10   Missed Visits: 1    Summary of Care / Assessment / Recommendations:   Patient seen this date for Occupational Therapy reassessment visit. Patient with improved  strength this date from 46# to 55#, 3 point pinch strength from 12# to 15#, 2 point pinch from 11# to 13#, and lateral pinch strength from 18# to 19#. Patient with improving ROM in L 4th and 5th MCP and PIP flexion, with some edema in L 4th limiting further flexion. Patient instructed in edema management with gentle massage. Patient with minimal improvement in wrist flexion and extension, improvement in shoulder abduction from 145 to 158 degrees, and in shoulder flexion from 145 to 152 degrees. Patient continues to report difficulty lifting heavy items and with picking up small items with left hand/UE. Patient has not had OT visit since  and reports he \"sometimes\" completes HEP at home. Patient HEP upgraded and patient instructed to complete HEP daily and to focus on using left hand to participate in more ADL and IADL tasks to improve function and strength. Patient instructed in importance of using left UE daily to gain strength and ROM then maintain benefits. Patient demonstrates understanding. Patient will continue to benefit from skilled OT services to modify and progress therapeutic interventions, address functional mobility deficits, address ROM deficits, address strength deficits, and instruct in home and community integration to attain remaining goals. Progress Toward Goals: To be accomplished in 6 treatments:  1- Patient will increase L  strength by 10# to improve L hand function.  -MET  2- Patient will increase L pinch strength by 3# each direction to improve L hand function. -PARTIALLY MET   3- Patient will improve L 9 hole peg by 20 seconds to improve fine motor coordination. Not assessed  4- Patient will increase L shoulder abduction and L wrist extension (AROM) by 10 degrees to improve independence with ADL/IADL tasks. -PARTIALLY MET  5- Patient will increase L 4th & 5th digit PIP/MCP flexion (AROM) by 5 degrees to improve independence with ADL/IADL tasks. -PARTIALLY MET     Long Term Goals: To be accomplished in 16 treatments:     1- Patient will increase L  strength by 20# to improve L hand function. -PROGRESSING  2- Patient will report performing ADL/IADL tasks independently. -PROGRESSING  3- Patient will be independent with HEP. -MET  4-  Patient will increase L 4th & 5th digit PIP/MCP flexion (AROM) by 10 degrees to improve independence with ADL/IADL tasks. -PROGRESSING     Frequency/Duration:  1 treatments per week, for 5 weeks. STEPHEN Alvarez/JIL, 9/20/2022         ________________________________________________________________________  NOTE TO PHYSICIAN:  Please complete the following and fax to:  [facility]:   Fax: [phone]  . Retain this original for your records. If you are unable to process this request in 24 hours, please contact our office.        ____ I have read the above report and request that my patient continue therapy with the following changes/special instructions:  ____ I have read the above report and request that my patient be discharged from therapy    Physician's Signature:_________________ Date:___________Time:__________

## 2022-09-20 NOTE — PROGRESS NOTES
OT DAILY TREATMENT NOTE  3-16    Patient Name: Becky Fried  Date:2022  : 1965  [x]  Patient  Verified  Payor: Annabelle Mccartney / Plan: Mirza Gilmore / Product Type: HMO /    In time:903  Out time:1020  Total Treatment Time (min): 68  Visit #: 10 of 15    Treatment Area: CVA (cerebrovascular accident) ([facility] [I63.9]    SUBJECTIVE  Pain Level (0-10 scale): 0/10  Any medication changes, allergies to medications, adverse drug reactions, diagnosis change, or new procedure performed?: [x] No    [] Yes (see summary sheet for update)  Subjective functional status/changes:   [] No changes reported  \"They say I'm getting better, but shit I can't tell. \" Patient reports continued difficulty holding items for an extended period of time, picking up small items, and lifting anything heavy. Patient reports left 3rd digit stays swollen all the time. Patient reports he felt he was improving then stopped. OBJECTIVE    Modality rationale: decrease edema, decrease inflammation, decrease pain, increase tissue extensibility, and increase muscle contraction/control to improve the patients ability to increase independence and safety with ADL/IADL tasks.     Min Type Additional Details    [] Estim:  []Unatt       []IFC  []Premod                        []Other:  []w/ice   []w/heat  Position:  Location:    [] Estim: []Att    []TENS instruct  []NMES                    []Other:  []w/US   []w/ice   []w/heat  Position:  Location:    []  Traction: [] Cervical       []Lumbar                       [] Prone          []Supine                       []Intermittent   []Continuous Lbs:  [] before manual  [] after manual    []  Ultrasound: []Continuous   [] Pulsed                           []1MHz   []3MHz W/cm2:  Location:    []  Iontophoresis with dexamethasone         Location: [] Take home patch   [] In clinic    []  Ice     []  heat  []  Ice massage  []  Laser   []  Anodyne Position:  Location:    []  Laser with stim  []  Other: Position:  Location:   10 [x]  Vasopneumatic Device Pressure:       [x] lo [] med [] hi   Temperature: 40 degrees   [x] Skin assessment post-treatment:  [x]intact []redness- no adverse reaction    []redness - adverse reaction:     20 min Therapeutic Exercise:  [x] See flow sheet :   Rationale: increase ROM, increase strength, improve coordination, and improve balance to improve the patients ability to increase independence and safety with ADL/IADL tasks. 47 min Therapeutic Activity:  [x]  See flow sheet :   Rationale: increase ROM, increase strength, and improve coordination  to improve the patients ability to increase independence and safety with ADL/IADL tasks. With   [] TE   [] TA   [] neuro   [] other: Patient Education: [x] Review HEP    [] Progressed/Changed HEP based on:   [] positioning   [] body mechanics   [] transfers   [] heat/ice application   [] Splint wear/care   [] Sensory re-education   [] scar management      [] other:          3 point pinch: 15  2 point pinch:13  Lateral pinch: 19   strenght: 55  L 4th PIP: 75  L 4th MCP: 58  L 5th PIP: 76  L 5th MCP: 56  L wrist flexion: 66  L wrist extension: 54  L wrist supination: 64  L shoulder flexion: 152  L shoulder abduction: 158     Pain Level (0-10 scale) post treatment: 0/10    ASSESSMENT/Changes in Function: Patient seen this date for Occupational Therapy reassessment visit. Patient with improved  strength this date from 46# to 55#, 3 point pinch strength from 12# to 15#, 2 point pinch from 11# to 13#, and lateral pinch strength from 18# to 19#. Patient with improving ROM in L 4th and 5th MCP and PIP flexion, with some edema in L 4th limiting further flexion. Patient instructed in edema management with gentle massage. Patient with minimal improvement in wrist flexion and extension, improvement in shoulder abduction from 145 to 158 degrees, and in shoulder flexion from 145 to 152 degrees.  Patient continues to report difficulty lifting heavy items and with picking up small items with left hand/UE. Patient has not had OT visit since 8/16 and reports he \"sometimes\" completes HEP at home. Patient HEP upgraded and patient instructed to complete HEP daily and to focus on using left hand to participate in more ADL and IADL tasks to improve function and strength. Patient instructed in importance of using left UE daily to gain strength and ROM then maintain benefits. Patient demonstrates understanding. Patient will continue to benefit from skilled OT services to modify and progress therapeutic interventions, address functional mobility deficits, address ROM deficits, address strength deficits, and instruct in home and community integration to attain remaining goals. [x]  See Plan of Care  [x]  See progress note/recertification  []  See Discharge Summary         Progress towards goals / Updated goals: To be accomplished in 6 treatments:  1- Patient will increase L  strength by 10# to improve L hand function. -MET  2- Patient will increase L pinch strength by 3# each direction to improve L hand function. -PARTIALLY MET   3- Patient will improve L 9 hole peg by 20 seconds to improve fine motor coordination. Not assessed  4- Patient will increase L shoulder abduction and L wrist extension (AROM) by 10 degrees to improve independence with ADL/IADL tasks. -PARTIALLY MET  5- Patient will increase L 4th & 5th digit PIP/MCP flexion (AROM) by 5 degrees to improve independence with ADL/IADL tasks. -PARTIALLY MET     Long Term Goals: To be accomplished in 16 treatments:     1- Patient will increase L  strength by 20# to improve L hand function. -PROGRESSING  2- Patient will report performing ADL/IADL tasks independently. -PROGRESSING  3- Patient will be independent with HEP. -MET  4-  Patient will increase L 4th & 5th digit PIP/MCP flexion (AROM) by 10 degrees to improve independence with ADL/IADL tasks.  -PROGRESSING     PLAN  [x]  Upgrade activities as tolerated     [x]  Continue plan of care  []  Update interventions per flow sheet       []  Discharge due to:_  []  Other:_      STEPHEN Lee/JIL, 9/20/2022

## 2022-09-22 ENCOUNTER — HOSPITAL ENCOUNTER (OUTPATIENT)
Dept: PHYSICAL THERAPY | Age: 57
Discharge: HOME | End: 2022-09-22
Payer: COMMERCIAL

## 2022-09-22 PROCEDURE — 97110 THERAPEUTIC EXERCISES: CPT

## 2022-09-22 NOTE — PROGRESS NOTES
PT DAILY TREATMENT NOTE 2-15    Patient Name: Latonya Lyles  Date:2022  : 1965  [x]  Patient  Verified  Payor: Aleksey Petersen / Plan: [employer] / Product Type: HMO /    In time:902 am  Out time:958  Total Treatment Time (min): 64  Visit #:  13    Treatment Area: Cerebrovascular accident (CVA), unspecified mechanism ([facility] [I63.9]  Cerebral ischemia [I67.82]  Left hemiparesis ([facility] [G81.94]  Gait disorder [R26.9]    SUBJECTIVE  Pain Level (0-10 scale): 0/10  Any medication changes, allergies to medications, adverse drug reactions, diagnosis change, or new procedure performed?: [x] No    [] Yes (see summary sheet for update)  Subjective functional status/changes: \"Pt reports doing ok no c/o. [place] \"    OBJECTIVE    56 min Therapeutic Exercise:  [x] See flow sheet :   Rationale: increase ROM, increase strength, improve coordination, improve balance, and increase proprioception to improve the patients ability to increase functional activities with more coordination in LE and balance improvement to reduce fall risk potential              With   [] TE   [] TA   [] neuro   [] other: Patient Education: [x] Review HEP    [] Progressed/Changed HEP based on:   [] positioning   [] body mechanics   [] transfers   [] heat/ice application    [] other:        Pain Level (0-10 scale) post treatment: 0/10    ASSESSMENT/Changes in Function:   Patient tolerated treatment worked on increased resistance with tasks for strengthening, stretching for mobility and balance task dynamic and static for coordination training. Pt did well with all however fatigue towards end with increased resistance.    Patient will continue to benefit from skilled PT services to modify and progress therapeutic interventions, address functional mobility deficits, address ROM deficits, address strength deficits, analyze and address soft tissue restrictions, analyze and modify body mechanics/ergonomics, and assess and modify postural abnormalities to attain remaining goals. [x]  See Plan of Care  []  See progress note/recertification  []  See Discharge Summary         Progress towards goals / Updated goals:  Short Term Goals: To be accomplished in 5 treatments. Patient will demonstrate independence and compliance with HEP in order to assist with carryover from PT services. MET  2. Patient will be able to ambulate for 20 minutes independently without fatigue or LOB to increase functional mobility. In Progress (15 min before getting tired)  3. Patient will be able to squat down to  object off ground without LOB to increase functional mobility. In Progress (must take extra time and use hand on leg for support)  4. Patient will be able to navigate stairs with step over gait pattern and use of B UE without LOB to increase functional mobility. MET  5. Patient will increase TUG score to 13 seconds in order to ambulate with less falls risk. MET (11 sec)     Long Term Goals: To be accomplished in 10  treatments. 1.   Patient will demonstrate independence and compliance with HEP in order to assist with carryover from PT services. MET  2. 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 Not Met (was told 1 year out of work)  3. Patient will be able to navigate stairs with step over gait pattern and use of 1 UE without lob to increase functional mobility. MET (can do without UE, but uses 1 UE for caution)  4. Patient will increase left hip flexion strength to 5/5 in order to improve gait quality.  In Progress (4/5 in some areas)    PLAN  [x]  Upgrade activities as tolerated     [x]  Continue plan of care  []  Update interventions per flow sheet       []  Discharge due to:_  []  Other:_      Curtis Salvage, PTA, LPTA 9/22/2022

## 2022-09-27 ENCOUNTER — HOSPITAL ENCOUNTER (OUTPATIENT)
Dept: PHYSICAL THERAPY | Age: 57
Discharge: HOME | End: 2022-09-27
Payer: COMMERCIAL

## 2022-09-27 PROCEDURE — 97530 THERAPEUTIC ACTIVITIES: CPT

## 2022-09-27 PROCEDURE — 97016 VASOPNEUMATIC DEVICE THERAPY: CPT

## 2022-09-27 PROCEDURE — 97110 THERAPEUTIC EXERCISES: CPT

## 2022-09-27 NOTE — PROGRESS NOTES
OT DAILY TREATMENT NOTE  3-16    Patient Name: Mik Bolden  Date:2022  : 1965  [x]  Patient  Verified  Payor: Nora Stewart / Plan: [id number] / Product Type: HMO /    In time:857  Out time:1000  Total Treatment Time (min): 58  Visit #: 65 of 15    Treatment Area: CVA (cerebrovascular accident) ([facility] [I63.9]    SUBJECTIVE  Pain Level (0-10 scale): 0/10  Any medication changes, allergies to medications, adverse drug reactions, diagnosis change, or new procedure performed?: [x] No    [] Yes (see summary sheet for update)  Subjective functional status/changes:   [] No changes reported  \"Some ways that I move my shoulder hurt, like reaching behind me. \"   \"This arm just won't do what I want it to, and it gets so tired so quick. \"    OBJECTIVE      18 min Therapeutic Exercise:  [x] See flow sheet :   Rationale: increase ROM, increase strength, and improve coordination to improve the patients ability to complete ADL/IADL tasks safely and independently. 35 min Therapeutic Activity:  [x]  See flow sheet :   Rationale: increase ROM, increase strength, and improve coordination  to improve the patients ability to complete ADL/IADL tasks safely and independently. Modality rationale: decrease edema, decrease inflammation, decrease pain, increase tissue extensibility, and increase muscle contraction/control to improve the patients ability to increase independence and safety with ADL/IADL tasks.     Min Type Additional Details      [] Estim:  []Unatt       []IFC  []Premod                        []Other:  []w/ice   []w/heat  Position:  Location:      [] Estim: []Att    []TENS instruct  []NMES                    []Other:  []w/US   []w/ice   []w/heat  Position:  Location:      []  Traction: [] Cervical       []Lumbar                       [] Prone          []Supine                       []Intermittent   []Continuous Lbs:  [] before manual  [] after manual      []  Ultrasound: []Continuous   [] Pulsed []1MHz   []3MHz W/cm2:  Location:      []  Iontophoresis with dexamethasone         Location: [] Take home patch   [] In clinic      []  Ice     []  heat  []  Ice massage  []  Laser   []  Anodyne Position:  Location:      []  Laser with stim  []  Other:  Position:  Location:    10 [x]  Vasopneumatic Device Pressure:       [x] lo [] med [] hi   Temperature: 40 degrees    [x] Skin assessment post-treatment:  [x]intact []redness- no adverse reaction    []redness - adverse reaction:      With   [x] TE   [x] TA   [] neuro   [] other: Patient Education: [x] Review HEP    [] Progressed/Changed HEP based on:   [] positioning   [] body mechanics   [] transfers   [] heat/ice application   [] Splint wear/care   [] Sensory re-education   [] scar management      [] other:         Pain Level (0-10 scale) post treatment: 0/10    ASSESSMENT/Changes in Function: Patient progressing with OT goals. Patient continues to complain about quick fatigue in shoulder and UE grossly. Patient reports arm won't \"do what I want it to do\" sometimes, but also that he doesn't use it much at home. Patient requires frequent rest breaks to complete exercises this date. Patient will continue to benefit from skilled OT services to modify and progress therapeutic interventions, address functional mobility deficits, address ROM deficits, address strength deficits, analyze and cue movement patterns, analyze and modify body mechanics/ergonomics, and instruct in home and community integration to attain remaining goals. [x]  See Plan of Care  []  See progress note/recertification  []  See Discharge Summary         Progress towards goals / Updated goals: To be accomplished in 6 treatments:  1- Patient will increase L  strength by 10# to improve L hand function. -MET  2- Patient will increase L pinch strength by 3# each direction to improve L hand function.  -PARTIALLY MET   3- Patient will improve L 9 hole peg by 20 seconds to improve fine motor coordination. Not assessed  4- Patient will increase L shoulder abduction and L wrist extension (AROM) by 10 degrees to improve independence with ADL/IADL tasks. -PARTIALLY MET  5- Patient will increase L 4th & 5th digit PIP/MCP flexion (AROM) by 5 degrees to improve independence with ADL/IADL tasks. -PARTIALLY MET     Long Term Goals: To be accomplished in 16 treatments:     1- Patient will increase L  strength by 20# to improve L hand function. -PROGRESSING  2- Patient will report performing ADL/IADL tasks independently. -PROGRESSING  3- Patient will be independent with HEP. -MET  4-  Patient will increase L 4th & 5th digit PIP/MCP flexion (AROM) by 10 degrees to improve independence with ADL/IADL tasks.  -PROGRESSING     PLAN  [x]  Upgrade activities as tolerated     [x]  Continue plan of care  []  Update interventions per flow sheet       []  Discharge due to:_  []  Other:_      STEPHEN Bush/JIL, 9/27/2022

## 2022-09-29 ENCOUNTER — APPOINTMENT (OUTPATIENT)
Dept: PHYSICAL THERAPY | Age: 57
End: 2022-09-29
Payer: COMMERCIAL

## 2022-10-04 ENCOUNTER — HOSPITAL ENCOUNTER (OUTPATIENT)
Dept: PHYSICAL THERAPY | Age: 57
Discharge: HOME | End: 2022-10-04
Payer: COMMERCIAL

## 2022-10-04 PROCEDURE — 97016 VASOPNEUMATIC DEVICE THERAPY: CPT

## 2022-10-04 PROCEDURE — 97110 THERAPEUTIC EXERCISES: CPT

## 2022-10-04 NOTE — PROGRESS NOTES
OT DAILY TREATMENT NOTE  3    Patient Name: Carlos Alberto Jacobs  Date:10/4/2022  : 1965  [x]  Patient  Verified  Payor: Sheri Tinsley / Plan: Delonte Mena / Product Type: HMO /    In time:902  Out time:1003  Total Treatment Time (min): 64  Visit #: 12 of 16    Treatment Area: CVA (cerebrovascular accident) ([facility] [I63.9]    SUBJECTIVE  Pain Level (0-10 scale): 0/10  Any medication changes, allergies to medications, adverse drug reactions, diagnosis change, or new procedure performed?: [x] No    [] Yes (see summary sheet for update)  Subjective functional status/changes:   [] No changes reported  \"It gets so tired. \"    OBJECTIVE    Modality rationale: decrease edema, decrease inflammation, and decrease pain to improve the patients ability to complete daily tasks.     Min Type Additional Details    [] Estim:  []Unatt       []IFC  []Premod                        []Other:  []w/ice   []w/heat  Position:  Location:    [] Estim: []Att    []TENS instruct  []NMES                    []Other:  []w/US   []w/ice   []w/heat  Position:  Location:    []  Traction: [] Cervical       []Lumbar                       [] Prone          []Supine                       []Intermittent   []Continuous Lbs:  [] before manual  [] after manual    []  Ultrasound: []Continuous   [] Pulsed                           []1MHz   []3MHz W/cm2:  Location:    []  Iontophoresis with dexamethasone         Location: [] Take home patch   [] In clinic    []  Ice     []  heat  []  Ice massage  []  Laser   []  Anodyne Position:  Location:    []  Laser with stim  []  Other:  Position:  Location:   10 [x]  Vasopneumatic Device Pressure:       [x] lo [] med [] hi   Temperature:43   [x] Skin assessment post-treatment:  [x]intact []redness- no adverse reaction    []redness - adverse reaction:     51 min Therapeutic Exercise:  [x] See flow sheet :   Rationale: increase ROM, increase strength, and improve coordination to improve the patients ability to complete ADL/IADL tasks independently without pain. With   [x] TE   [x] TA   [] neuro   [] other: Patient Education: [x] Review HEP    [x] Progressed/Changed HEP based on:   [] positioning   [] body mechanics   [] transfers   [] heat/ice application   [] Splint wear/care   [] Sensory re-education   [] scar management      [] other:       Pain Level (0-10 scale) post treatment: 0/10    ASSESSMENT/Changes in Function: Patient seen this date for occupational therapy visit. Patient reports continued quick fatigue in shoulder and UE grossly. Patient with complaints of pain during shoulder strengthening exercises with weights. Patient identifies pain along upper shoulder complex, increased with abduction and flexion. Patient assessed with drop arm test and empty can test with negative results. Patient reports pain during test. Patient tolerates manipulation and coordination tasks without complaints of pain. Recommend patient make appointment with orthopedic to assess shoulder pain. Patient in agreement. Patient will continue to benefit from skilled OT services to modify and progress therapeutic interventions, address functional mobility deficits, address ROM deficits, address strength deficits, analyze and cue movement patterns, analyze and modify body mechanics/ergonomics, and instruct in home and community integration to attain remaining goals. [x]  See Plan of Care  []  See progress note/recertification  []  See Discharge Summary         Progress towards goals / Updated goals: To be accomplished in 6 treatments:  1- Patient will increase L  strength by 10# to improve L hand function. -MET  2- Patient will increase L pinch strength by 3# each direction to improve L hand function. -PARTIALLY MET   3- Patient will improve L 9 hole peg by 20 seconds to improve fine motor coordination.  Not assessed  4- Patient will increase L shoulder abduction and L wrist extension (AROM) by 10 degrees to improve independence with ADL/IADL tasks. -PARTIALLY MET  5- Patient will increase L 4th & 5th digit PIP/MCP flexion (AROM) by 5 degrees to improve independence with ADL/IADL tasks. -PARTIALLY MET     Long Term Goals: To be accomplished in 16 treatments:     1- Patient will increase L  strength by 20# to improve L hand function. -PROGRESSING  2- Patient will report performing ADL/IADL tasks independently. -PROGRESSING  3- Patient will be independent with HEP. -MET  4-  Patient will increase L 4th & 5th digit PIP/MCP flexion (AROM) by 10 degrees to improve independence with ADL/IADL tasks.  -PROGRESSING     PLAN  [x]  Upgrade activities as tolerated     [x]  Continue plan of care  []  Update interventions per flow sheet       []  Discharge due to:_  []  Other:_      STEPHEN Mabry/JIL, 10/4/2022

## 2022-10-12 ENCOUNTER — APPOINTMENT (OUTPATIENT)
Dept: PHYSICAL THERAPY | Age: 57
End: 2022-10-12
Payer: COMMERCIAL

## 2022-11-28 NOTE — THERAPY DISCHARGE
[address]  [place], [place]  Ph: [phone]    Fax: [phone]    Discharge Summary  2-15    Patient name: Ricky Alejandra  : 1965  Provider#: [id number]  Referral source: Larry Antonio MD      Medical/Treatment Diagnosis: CVA (cerebrovascular accident) [facility]) [I63.9]     Prior Hospitalization: see medical history     Comorbidities: See Plan of Care  Prior Level of Function:See Plan of Care  Medications: Verified on Patient Summary List    Start of Care: 2022      Onset Date:2022   Visits from Start of Care: 12 Missed Visits: 1  Reporting Period : 2022 to 2022      ASSESSMENT/SUMMARY OF CARE:   Patient being seen for Occupational Therapy following CVA with left UE weakness. Patient progressing slowly with OT goals to increase ROM and strength. Patient not show following 10/4/22 visit. Patient reports on follow-up call he would like to be discharged as he \"didn't see where it was helping\" regarding therapy. Patient discharged from OT services. Progress Toward Goals: To be accomplished in 6 treatments:  1- Patient will increase L  strength by 10# to improve L hand function. -MET  2- Patient will increase L pinch strength by 3# each direction to improve L hand function. -PARTIALLY MET   3- Patient will improve L 9 hole peg by 20 seconds to improve fine motor coordination. Not assessed  4- Patient will increase L shoulder abduction and L wrist extension (AROM) by 10 degrees to improve independence with ADL/IADL tasks. -PARTIALLY MET  5- Patient will increase L 4th & 5th digit PIP/MCP flexion (AROM) by 5 degrees to improve independence with ADL/IADL tasks. -PARTIALLY MET     Long Term Goals: To be accomplished in 16 treatments:     1- Patient will increase L  strength by 20# to improve L hand function. -PROGRESSING  2- Patient will report performing ADL/IADL tasks independently.  -PROGRESSING  3- Patient will be independent with HEP. -MET  4-  Patient will increase L 4th & 5th digit PIP/MCP flexion (AROM) by 10 degrees to improve independence with ADL/IADL tasks.  -PROGRESSING       RECOMMENDATIONS:  [x]Discontinue therapy: []Patient has reached or is progressing toward set goals      []Patient is non-compliant or has abdicated      []Due to lack of appreciable progress towards set goals      [x]Other      STEPHEN Cool/JIL, 11/28/2022

## 2022-12-13 NOTE — THERAPY DISCHARGE
[address]  [place], [place]  Ph: [phone]     Fax: [phone]    Discharge Summary 2-15    Patient name: Mahesh Longoria  : 1965  Provider#: [id number]  Referral source: Magy Knox MD      Medical/Treatment Diagnosis: Cerebrovascular accident (CVA), unspecified mechanism ([address] [I63.9]  Cerebral ischemia [I67.82]  Left hemiparesis ([address] [G81.94]  Gait disorder [R26.9]     Prior Hospitalization: see medical history     Comorbidities: See Plan of Care  Prior Level of Function: See Plan of Care  Medications: Verified on Patient Summary List    Start of Care: 2022      Onset Date:2022   Visits from Start of Care: 13     Missed Visits: 5  Reporting Period : 2022 to 22    Assessment/Summary of care:     Patient has not returned for treatment since 2022. Patient was called secondary to absence and states he does not feel like PT is helping. Patient educated to return to Physician for new prescription if the problem persists so she can return to physical therapy. Patient is now formally discharged from physical therapy due to lack of attendance. Goals have not been formally assessed due to patient's discontinuance. Progress Toward Goals:  Short Term Goals: To be accomplished in 5 treatments. Patient will demonstrate independence and compliance with HEP in order to assist with carryover from PT services. MET  2. Patient will be able to ambulate for 20 minutes independently without fatigue or LOB to increase functional mobility. In Progress (15 min before getting tired)  3. Patient will be able to squat down to  object off ground without LOB to increase functional mobility. In Progress (must take extra time and use hand on leg for support)  4. Patient will be able to navigate stairs with step over gait pattern and use of B UE without LOB to increase functional mobility. MET  5.    Patient will increase TUG score to 13 seconds in order to ambulate with less falls risk. MET (11 sec)     Long Term Goals: To be accomplished in 10  treatments. 1.   Patient will demonstrate independence and compliance with HEP in order to assist with carryover from PT services. MET  2. 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 Not Met (was told 1 year out of work)  3. Patient will be able to navigate stairs with step over gait pattern and use of 1 UE without lob to increase functional mobility. MET (can do without UE, but uses 1 UE for caution)  4. Patient will increase left hip flexion strength to 5/5 in order to improve gait quality.  In Progress (4/5 in some areas)        RECOMMENDATIONS:  [x]Discontinue therapy: []Patient has reached or is progressing toward set goals     [x]Patient is non-compliant or has abdicated     []Due to lack of appreciable progress towards set goals     []Other  Dorothy Willard PT, DPT 12/13/2022

## 2023-11-28 NOTE — TELEPHONE ENCOUNTER
Care Management Follow Up    Length of Stay (days): 11    Expected Discharge Date: 11/28/2023     Concerns to be Addressed: discharge planning     Patient plan of care discussed at interdisciplinary rounds: Yes    Anticipated Discharge Disposition:  Home      Anticipated Discharge Services:  RN, PT/OT, HHA  Anticipated Discharge DME: Commode, Bed, Other (see comment) (lift)    Patient/family educated on Medicare website which has current facility and service quality ratings:  n/a   Education Provided on the Discharge Plan:  yes   Patient/Family in Agreement with the Plan: yes    Referrals Placed by CM/SW:    Private pay costs discussed:  possible rent expense for hospital bed anticipated    Additional Information:  RNCC notified Pt is medically ready and family is requesting to go home. Will need home care and DME to support home discharge.    [facility] has accepted for RN, PT, OT, and HHA. Home care orders placed.    RNCC worked with Dr. Sinclair for DME orders for a hospital bed and savana lift. DME for commode also submitted. RNCC faxed DME orders to [employer]. Primary RNCC to follow up.    Maru Marquez  DME INTAKE SERVICES  E: [email or web address]  P: [phone] [address] [phone]  F: [phone]    Care management will continue to follow and support safe discharge planning as needed.    Jennifer Porter RN  RNCC Float   [phone]     Pt last seen Oct 2017. Was advised that he needs an appt for further refills. Informed pt that two week supply will be sent to pharmacy to last until he is seen on scheduled appt tday 5/1/18. Pt completely out of medication and would like it sent to [facility] on file.

## 2024-06-18 ENCOUNTER — HOSPITAL ENCOUNTER (OUTPATIENT)
Facility: HOSPITAL | Age: 59
Setting detail: RECURRING SERIES
Discharge: HOME | End: 2024-06-21
Payer: COMMERCIAL

## 2024-06-18 PROCEDURE — 97161 PT EVAL LOW COMPLEX 20 MIN: CPT

## 2024-06-18 NOTE — THERAPY EVALUATION
[facility]  [address] [place] [id number]  Ph: [phone]     Fax: [phone]             PHYSICAL THERAPY - EVALUATION/PLAN OF CARE NOTE (updated 3/23)      Date of Service: 2024        Patient Name:  Randy Gomez :  1965   Medical   Diagnosis:  Left-sided weakness [R53.1] Treatment Diagnosis:  M62.81  GENERAL MUSCLE WEAKNESS and R26.89   Abnormalities of gait and mobility    Referral Source:  Merari Caruso, * Provider #:  [id number]   Insurance: Payor: [facility] / Plan: [facility] HEALTHKEEPERS / Product Type: *No Product type* /      Visit #   Current  / Total 1    Time   In / Out 3400 3960   Total Treatment Time 55 minutes   Total Timed Codes 0 minutes   [x] Patient's date of birth verified      SUBJECTIVE  Pain Level (0-10 scale): 0/10  Changes in pain since onset: Not applicable    Any medication changes, allergies to medications, adverse drug reactions, diagnosis change, or new procedure performed?: [x] No    [] Yes (see summary sheet for update)  Medications: Verified on Patient Summary List    Subjective functional status/changes:     Patient is 59 y.o. -American male who presents for physical therapy evaluation s/p CVA in 2024 affecting his L side. He was sent to rehab facility post hospital stay and then had home health services. He reports that he has not had therapy for about one month. He uses QC in the home and in the community. He has had a few falls since his stroke. He is fully independent with IADLs with notes of some decreased balance while in the shower. His sister helps with cooking and cleaning just because he lives with her.     Onset Date: 2024  Start of Care: 2024  PLOF: Independent with all ADL's   Mechanism of Injury:  CVA  Previous Treatment/Compliance: Physical Therapy  Radiographs: MRI  Work Hx: Patient is on disability.   Living Situation: Patient lives with sister., One [place] home., and 7

## 2024-06-18 NOTE — PROGRESS NOTES
Patient arrives for OT evaluation with diagnosis of L side weakness and decreased use of L UE. Patient reports upon arrival to OT office that he wants to focus his therapy on his walking and strengthening left LE. Patient observed to have decreased AROM of L UE and may benefit from OT services. Discuss participating in OT and PT services with patient declining at this time. Instruct patient if he changes his mind and would like OT services in the future he would need new order (after 30 days). Patient demonstrates understanding and in agreement with PT eval only at this time.

## 2024-06-26 ENCOUNTER — HOSPITAL ENCOUNTER (OUTPATIENT)
Facility: HOSPITAL | Age: 59
Setting detail: RECURRING SERIES
Discharge: HOME | End: 2024-06-29
Payer: COMMERCIAL

## 2024-06-26 PROCEDURE — 97110 THERAPEUTIC EXERCISES: CPT

## 2024-06-26 NOTE — PROGRESS NOTES
PHYSICAL THERAPY - DAILY TREATMENT NOTE (updated 3/23)    Date of Service: 2024        Patient Name:  Randy Gomez :  1965   Medical   Diagnosis:  Left-sided weakness [R53.1] Treatment Diagnosis:  M62.81  GENERAL MUSCLE WEAKNESS, R26.81   Unsteadiness on feet, and R27.8     Other lack of coordination    Referral Source:  Merari Caruso, * Insurance:   Payor: [facility] / Plan: [facility] HEALTHKEEPERS / Product Type: *No Product type* /     [x] Patient's date of birth verified                Visit #   Current  / Total 2 20   Time   In / Out 1055 am 1151 am   Total Treatment Time (in minutes) 56    Total Timed Codes  (in minutes) 56    [facility] Totals Reminder:    8-22 min = 1 unit; 23-37 min = 2 units; 38-52 min = 3 units;  53-67 min = 4 units; 68-82 min = 5 units      SUBJECTIVE  Pain Level (0-10 scale): 0/10    Any medication changes, allergies to medications, adverse drug reactions, diagnosis change, or new procedure performed?: No  Medications: [x]  Verified on Patient Summary List    Subjective functional status/changes:     \"Pt reports no pain just limited control of his L hand and foot.\"    OBJECTIVE  Therapeutic Procedures:  Tx Min Billable or 1:1 Min (if diff from Tx Min) Procedure, Rationale, Specifics   54 54 [id number] Therapeutic Exercise (timed):  increase ROM, strength, coordination, balance, and proprioception to improve patient's ability to progress to PLOF and address remaining functional goals. (see flow sheet as applicable)   54 54    Total Total   [x] Patient Education billed concurrently with other procedures    Modalities Rationale:       Pain Level at end of session (0-10 scale): 0/10    Assessment   Patient tolerated treatment working on general strength, ROM and balance. Pt does fatigue quickly and has increased trouble with controlling L foot and limited L UE assistance with standing and amb. In // bars. Pt has no pain with ex just increased lack of control of LLE as he progresses

## 2024-06-28 ENCOUNTER — HOSPITAL ENCOUNTER (OUTPATIENT)
Facility: HOSPITAL | Age: 59
Setting detail: RECURRING SERIES
Discharge: HOME | End: 2024-07-01
Payer: COMMERCIAL

## 2024-06-28 PROCEDURE — 97110 THERAPEUTIC EXERCISES: CPT

## 2024-06-28 NOTE — PROGRESS NOTES
PHYSICAL THERAPY - DAILY TREATMENT NOTE (updated 3/23)    Date of Service: 2024        Patient Name:  Randy Gomez :  1965   Medical   Diagnosis:  Left-sided weakness [R53.1] Treatment Diagnosis:  M62.81  GENERAL MUSCLE WEAKNESS and R26.89   Abnormalities of gait and mobility    Referral Source:  Merari Caruso, * Insurance:   Payor: [facility] / Plan: [facility] HEALTHKEEPERS / Product Type: *No Product type* /     [x] Patient's date of birth verified                Visit #   Current  / Total 3 20   Time   In / Out 1002 1057   Total Treatment Time (in minutes) 55    Total Timed Codes  (in minutes) 55    [facility] Totals Reminder:    8-22 min = 1 unit; 23-37 min = 2 units; 38-52 min = 3 units;  53-67 min = 4 units; 68-82 min = 5 units    SUBJECTIVE  Pain Level (0-10 scale): 0/10    Any medication changes, allergies to medications, adverse drug reactions, diagnosis change, or new procedure performed?: No  Medications: [x]  Verified on Patient Summary List    Subjective functional status/changes:     Pt comes in using QC and notable L foot drop.    OBJECTIVE  Therapeutic Procedures:  Tx Min Billable or 1:1 Min (if diff from Tx Min) Procedure, Rationale, Specifics   55  [id number] Therapeutic Exercise (timed):  increase ROM, strength, coordination, balance, and proprioception to improve patient's ability to progress to PLOF and address remaining functional goals. (see flow sheet as applicable)   55     Total Total   [x] Patient Education billed concurrently with other procedures    Pain Level at end of session (0-10 scale): 0/10    Assessment   Patient tolerated treatment fair. Consulted with PT about getting pt a hinged AFO for L ankle. Increased cueing today during sit to stands to lean more forward to prevent retro LOB when trying to stand. Patient will continue to benefit from skilled PT services to analyze and address functional mobility deficits, analyze and address ROM deficits, and analyze and address

## 2024-06-28 NOTE — PROGRESS NOTES
[facility]  [address] [place] [address]  Ph: [phone]    Fax: [phone]    Physician Communication    Name: Randy Gomez   : 1965   MD: Merari Caruso, *       Treatment Diagnosis: Left-sided weakness [R53.1]      Patient would benefit from a dynamic AFO to assist with DF during gait. He has fair MMT strength in the ankle, but notes of inversion during stance phase and decreased toe clearance. Please respond with signing attach prescription upon Physician approval. Thank you for this referral.       Pia Locke, PT 2024

## 2024-07-03 ENCOUNTER — HOSPITAL ENCOUNTER (OUTPATIENT)
Facility: HOSPITAL | Age: 59
Setting detail: RECURRING SERIES
Discharge: HOME | End: 2024-07-06
Payer: COMMERCIAL

## 2024-07-03 PROCEDURE — 97110 THERAPEUTIC EXERCISES: CPT

## 2024-07-03 NOTE — PROGRESS NOTES
.PHYSICAL THERAPY - DAILY TREATMENT NOTE (updated 3/23)    Date of Service: 7/3/2024        Patient Name:  Randy Gomez :  1965   Medical   Diagnosis:  Left-sided weakness [R53.1] Treatment Diagnosis:  M62.81  GENERAL MUSCLE WEAKNESS, R26.81   Unsteadiness on feet, and R26.89   Abnormalities of gait and mobility    Referral Source:  Merari Caruso, * Insurance:   Payor: [facility] / Plan: [facility] HEALTHKEEPERS / Product Type: *No Product type* /     [x] Patient's date of birth verified                Visit #   Current  / Total 4 20   Time   In / Out 1350 1435   Total Treatment Time (in minutes) 45    Total Timed Codes  (in minutes) 45    [facility] Totals Reminder:    8-22 min = 1 unit; 23-37 min = 2 units; 38-52 min = 3 units;  53-67 min = 4 units; 68-82 min = 5 units      SUBJECTIVE  Pain Level (0-10 scale): 0/10    Any medication changes, allergies to medications, adverse drug reactions, diagnosis change, or new procedure performed?: No  Medications: [x]  Verified on Patient Summary List    Subjective functional status/changes:      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    OBJECTIVE  Therapeutic Procedures:  Tx Min Billable or 1:1 Min (if diff from Tx Min) Procedure, Rationale, Specifics   45 45 08052 Therapeutic Exercise (timed):  increase ROM, strength, coordination, balance, and proprioception to improve patient's ability to progress to PLOF and address remaining functional goals. (see flow sheet as applicable)                  45 45    Total Total   [x] Patient Education billed concurrently with other procedures        Pain Level at end of session (0-10 scale): 0/10    Assessment   Patient tolerated treatment with balance and strengthening exercise due to left LE weakness. Patient has considerable weakness of hip extension and abduction that is more apparent in standing position. Active movement of left LE. Decreased strength of left UE. Encouraged to use his left hand as

## 2024-07-10 ENCOUNTER — HOSPITAL ENCOUNTER (OUTPATIENT)
Facility: HOSPITAL | Age: 59
Setting detail: RECURRING SERIES
Discharge: HOME | End: 2024-07-13
Payer: COMMERCIAL

## 2024-07-10 PROCEDURE — 97110 THERAPEUTIC EXERCISES: CPT

## 2024-07-10 NOTE — PROGRESS NOTES
[facility]  [address] [place] [address]  Ph: [phone]     Fax: [phone]    PHYSICAL THERAPY PROGRESS NOTE  Patient Name:  Randy Gomez :  1965   Treatment/Medical Diagnosis: Left-sided weakness [R53.1]   Referral Source:  Merari Caruso, *     Date of Initial Visit:  2024 Attended Visits:  5 Missed Visits:  0       Summary of Care / Assessment / Recommendations:   Patient tolerated treatment well today. He has demonstrated improvements in LE strength with notes of more weakness when he becomes fatigued after walking or exercises. He has been fitted for an AFO, but has not received it yet. This addition of the AFO should help reduce his fall risk as he catches his toe about every 5 steps. He has improved slightly on NAYAK balance test tp 32/56 which still puts him in the high fall risk category. Overall, his tolerance and endurance is still low which demonstrates continued need to work on strengthening exercises in the clinic and balance tasks.  Patient will continue to benefit from skilled PT services to analyze and address functional mobility deficits, analyze and address ROM deficits, analyze and address strength deficits, analyze and cue for proper movement patterns, and analyze and address imbalance/dizziness to address functional deficits and attain remaining goals.     GOALS  Short Term Goals, to be met within 10  treatments:  Patient will demonstrate independence and compliance with HEP in order to increase mobility and assist with carryover from PT services.  Status at last Eval/Progress Note: provided  Current Status: provided  Goal Met?  yes     2.  Patient will be able to stand without UE support for > 5 minutes to perform self hygiene tasks without risk of falling.   Status at last Eval/Progress Note: needs UE support for longer than about 1 min  Current Status: can perform task  Goal Met?  yes     3. Patient will be able to

## 2024-07-10 NOTE — PROGRESS NOTES
.PHYSICAL THERAPY - DAILY TREATMENT NOTE (updated 3/23)    Date of Service: 7/10/2024        Patient Name:  Randy Gomez :  1965   Medical   Diagnosis:  Left-sided weakness [R53.1] Treatment Diagnosis:  M62.81  GENERAL MUSCLE WEAKNESS, R26.81   Unsteadiness on feet, and R26.89   Abnormalities of gait and mobility    Referral Source:  Merari Caruso, * Insurance:   Payor: [facility] / Plan: [facility] HEALTHKEEPERS / Product Type: *No Product type* /     [x] Patient's date of birth verified                Visit #   Current  / Total 5 5   Time   In / Out 1106 1203   Total Treatment Time (in minutes) 57   Total Timed Codes  (in minutes) 57   [facility] Totals Reminder:    8-22 min = 1 unit; 23-37 min = 2 units; 38-52 min = 3 units;  53-67 min = 4 units; 68-82 min = 5 units      SUBJECTIVE  Pain Level (0-10 scale): 0/10    Any medication changes, allergies to medications, adverse drug reactions, diagnosis change, or new procedure performed?: No  Medications: [x]  Verified on Patient Summary List    Subjective functional status/changes:      \" I got fitted for my brace this morning. They should have it here in 2 weeks.    OBJECTIVE  Posture:  rounded shoulders   Other Observations:  decreased ankle control with gait for lack of EV and DF for step through ( was fitted for AFO on 07/10/2024)  Gait and Functional Mobility:  step to pattern with QC; noted significant catching of L toe needing some assistance to correct balance  NAYAK/56     Lower Extremity Testing          MMT     Right Left   Hip   Hip Flexion     5/5 4-/5   Hip Abduction    5/5 4+/5   Hip Adduction   5/5 5/5   Knee   Knee Extension 5/5 4/5   Knee Flexion   5/5 4/5   Ankle   Ankle PF 5/5 4-/5   Ankle DF 5/5 4-/5   Ankle INV 5/5 4-/5   Ankle EV 5/5 4-/5   All ROM measurements are measured in degrees in seated position.     Therapeutic Procedures:  Tx Min Billable or 1:1 Min (if diff from Tx Min) Procedure, Rationale, Specifics   57 87 [id number]

## 2024-07-15 ENCOUNTER — HOSPITAL ENCOUNTER (OUTPATIENT)
Facility: HOSPITAL | Age: 59
Setting detail: RECURRING SERIES
Discharge: HOME | End: 2024-07-18
Payer: COMMERCIAL

## 2024-07-15 PROCEDURE — 97110 THERAPEUTIC EXERCISES: CPT

## 2024-07-15 NOTE — PROGRESS NOTES
deficits and attain remaining goals.    PRECAUTIONS:   >>>PATIENT IS HIGH FALL RISK<<<          Date of Initial Evaluation: 6/18/2024  Date of last Progress Note: 07/10/2024  Visit # of last Progress Note: 5      Number of Insurance Authorized visits: 10 visits until 9/7/24         Therapeutic Exercise Flow Sheet:                                                                                    Running Visit #    EXERCISE   1   2   3   4   5   6   7   8   9   10      Nustep      Lvl 1 x 10'  Timer  x12'         12' level 3  Lvl 2 10 min timer    Lvl 3 x12'  timer               3 Way Hip. March, HS Curl        2/10 ea alt  (3 way)     3 way   + marches  Alt x20    X 20        Alt x20              Sit to stand          2/10  0 UE  2x10     0 UE x 10   0 UE   2x10    0 UE  2x10              Tandem Stance                30 sec x 2 level ground                   Step Ups                FW  both LE and Lateral left only 6' x20                  LAQ         Seated with ball x 2/10 alt      Seated with ball x 20       X20 B                 Hip abd/add       RTB   and ball x 30               Seated Marches           2/10 alt       X 20 alt     X20 B                 Mini squats           X 10                tandem walk        3 laps     2 UE  x2'     1 UE  x2'            Leg press         10#   x 2/10    20# x20        30# x20           Apollo leg ext/flex        10#  ext  2/10    flex  x 10 down 2 up1  flex: 40# x20  Ext: 20# x20     Flex: 40# x20  Ext: 20# x20  flex: 40# x20  Ext: 20# x20              sidestepping      1-2 UE  x2'      1 UE  x2'           [x] Eval only for Running Visit #1, no treatment provided     Modalities to be included future treatment sessions: TBD  Has HEP been provided to patient? [] No    [x] Yes                                      Access Code:   [email or web address]          Date Provided: 06/18/2024  [] Reviewed HEP    [] Progressed/Changed HEP, details:      GOALS  Short Term Goals, to be met within 10

## 2024-07-17 ENCOUNTER — HOSPITAL ENCOUNTER (OUTPATIENT)
Facility: HOSPITAL | Age: 59
Setting detail: RECURRING SERIES
Discharge: HOME | End: 2024-07-20
Payer: COMMERCIAL

## 2024-07-17 PROCEDURE — 97110 THERAPEUTIC EXERCISES: CPT

## 2024-07-17 NOTE — PROGRESS NOTES
PHYSICAL THERAPY - DAILY TREATMENT NOTE (updated 3/23)    Date of Service: 2024        Patient Name:  Randy Gomez :  1965   Medical   Diagnosis:  Left-sided weakness [R53.1] Treatment Diagnosis:  M62.81  GENERAL MUSCLE WEAKNESS    Referral Source:  Merari Caruso, * Insurance:   Payor: [facility] / Plan: [facility] HEALTHKEEPERS / Product Type: *No Product type* /     [x] Patient's date of birth verified                Visit #   Current  / Total 7 10   Time   In / Out 13:03 13:59   Total Treatment Time (in minutes) 56    Total Timed Codes  (in minutes) 56    [facility] Totals Reminder:    8-22 min = 1 unit; 23-37 min = 2 units; 38-52 min = 3 units;  53-67 min = 4 units; 68-82 min = 5 units      SUBJECTIVE  Pain Level (0-10 scale): 0/10    Any medication changes, allergies to medications, adverse drug reactions, diagnosis change, or new procedure performed?: No  Medications: [x]  Verified on Patient Summary List    Subjective functional status/changes:     \"I am not hurting and havent' had any falls..\"    OBJECTIVE  Therapeutic Procedures:  Tx Min Billable or 1:1 Min (if diff from Tx Min) Procedure, Rationale, Specifics   56 56 [id number] Therapeutic Exercise (timed):  increase ROM, strength, coordination, balance, and proprioception to improve patient's ability to progress to PLOF and address remaining functional goals. (see flow sheet as applicable)                  56 56    Total Total   [x] Patient Education billed concurrently with other procedures    Pain Level at end of session (0-10 scale): 0/10    Assessment   Patient tolerated all exercises without complaint. Patient required close CGA at gait belt for safety due to high fall risk . Patient demonstrated poor LLE foot clearance during ambulatoiin with SBQC. Patient instructed in and completed repetitive task specific sit ><stand to fascilitate increased LE str., balance and safety with  transfers and demonstrated poor LE eccentric control lowering to

## 2024-07-22 ENCOUNTER — HOSPITAL ENCOUNTER (OUTPATIENT)
Facility: HOSPITAL | Age: 59
Setting detail: RECURRING SERIES
Discharge: HOME | End: 2024-07-25
Payer: COMMERCIAL

## 2024-07-22 PROCEDURE — 97110 THERAPEUTIC EXERCISES: CPT

## 2024-07-22 NOTE — PROGRESS NOTES
2 UE  x2'     1 UE  x2'   1UE   X 2'  1UE   X 2'        Leg press         10#   x 2/10    20# x20        30# x20 30# X 20  30# X 20         Apollo leg ext/flex        10#  ext  2/10    flex  x 10 down 2 up1  flex: 40# x20  Ext: 20# x20     Flex: 40# x20  Ext: 20# x20  flex: 40# x20  Ext: 20# x20   Flex 40#  X 20;   Ext. 20#  X 20     Flex 40#  X 20;   Ext. 20#  X 20           sidestepping      1-2 UE  x2'      1 UE  x2'  1 UE  X 2'  1 UE  X 2'       [x] Eval only for Running Visit #1, no treatment provided     Modalities to be included future treatment sessions: TBD  Has HEP been provided to patient? [] No    [x] Yes                                      Access Code:   [email or web address]          Date Provided: 06/18/2024  [] Reviewed HEP    [] Progressed/Changed HEP, details:      GOALS  Short Term Goals, to be met within 10  treatments:  Patient will demonstrate independence and compliance with HEP in order to increase mobility and assist with carryover from PT services.  Status at last Eval/Progress Note: provided  Current Status: provided  Goal Met?  yes     2.  Patient will be able to stand without UE support for > 5 minutes to perform self hygiene tasks without risk of falling.   Status at last Eval/Progress Note: needs UE support for longer than about 1 min  Current Status: can perform task  Goal Met?  yes     3. Patient will be able to ambulate with less than 5 times his toe catching in 250 ft.   Status at last Eval/Progress Note: every few steps  Current Status: every few steps the toe catches causes some instability  Goal Met?  No     Long Term Goals, to be met within 20 treatments:  1.Patient will be able to improve LE strength to 4+/5 throughout LLE to be able to get in and out of the car with more ease.   Status at last Eval/Progress Note: 4-/5  Current Status: 4-/5 weakest at hip and knee  Goal Met?  In Progress     2.  Patient will be able to improve NAYAK score to 45/56 to demonstrate improved independence and

## 2024-07-24 ENCOUNTER — HOSPITAL ENCOUNTER (OUTPATIENT)
Facility: HOSPITAL | Age: 59
Setting detail: RECURRING SERIES
Discharge: HOME | End: 2024-07-27
Payer: COMMERCIAL

## 2024-07-24 PROCEDURE — 97110 THERAPEUTIC EXERCISES: CPT

## 2024-07-24 NOTE — PROGRESS NOTES
PHYSICAL THERAPY - MEDICARE DAILY TREATMENT NOTE (updated 3/23)    Date of Service: 2024        Patient Name:  Randy Gomez :  1965   Medical   Diagnosis:  Left-sided weakness [R53.1] Treatment Diagnosis:  R26.81   Unsteadiness on feet and R26.89   Abnormalities of gait and mobility    Referral Source:  Merari Caruso, * Insurance:   Payor: [facility] / Plan: [facility] [employer] / Product Type: *No Product type* /    [x] Patient's date of birth verified                      Visit #   Current  / Total 9 10   Time   In / Out 1050 1130   Total Treatment Time (in minutes) 40    Total Timed Codes (in minutes) 40    1:1 Treatment Time (in minutes) 40       [facility] Totals Reminder:  bill using total billable   min of TIMED therapeutic procedures and modalities.   8-22 min = 1 unit; 23-37 min = 2 units; 38-52 min = 3 units; 53-67 min = 4 units; 68-82 min = 5 units        SUBJECTIVE  Pain Level (0-10 scale): 0/10    Any medication changes, allergies to medications, adverse drug reactions, diagnosis change, or new procedure performed?: No  Medications: [x] Verified on Patient Summary List    Subjective functional status/changes:     \"I received my ankle brace today, but I cannot wear them in my shoes until I buy larger ones.\"   The brace feels good.     OBJECTIVE  Therapeutic Procedures:  Tx Min Billable or 1:1 Min (if diff from Tx Min) Procedure, Rationale, Specifics   40 40 [id number] Therapeutic Exercise (timed):  increase ROM, strength, coordination, balance, and proprioception to improve patient's ability to progress to PLOF and address remaining functional goals. (see flow sheet as applicable)                   40 40    Total Total   [x] Patient Education billed concurrently with other procedures       Pain Level at end of session (0-10 scale): 0/10    Assessment   Patient tolerated treatment with strengthening exercises of LE and balance exercise to improve gait pattern. Patient was advised to wear

## 2024-07-26 ENCOUNTER — HOSPITAL ENCOUNTER (OUTPATIENT)
Facility: HOSPITAL | Age: 59
Setting detail: RECURRING SERIES
Discharge: HOME | End: 2024-07-29
Payer: COMMERCIAL

## 2024-07-26 PROCEDURE — 97110 THERAPEUTIC EXERCISES: CPT

## 2024-07-26 NOTE — PROGRESS NOTES
PHYSICAL THERAPY - MEDICARE DAILY TREATMENT NOTE (updated 3/23)    Date of Service: 2024        Patient Name:  Randy Gomez :  1965   Medical   Diagnosis:  Left-sided weakness [R53.1] Treatment Diagnosis:  M62.81  GENERAL MUSCLE WEAKNESS, R26.81   Unsteadiness on feet, and R26.89   Abnormalities of gait and mobility    Referral Source:  Merari Caruso, * Insurance:   Payor: [facility] / Plan: [facility] [employer] / Product Type: *No Product type* /    [x] Patient's date of birth verified                      Visit #   Current  / Total 10 10   Time   In / Out 1300 1353   Total Treatment Time (in minutes) 53    Total Timed Codes (in minutes) 53    1:1 Treatment Time (in minutes) 53       [facility] Totals Reminder:  bill using total billable   min of TIMED therapeutic procedures and modalities.   8-22 min = 1 unit; 23-37 min = 2 units; 38-52 min = 3 units; 53-67 min = 4 units; 68-82 min = 5 units        SUBJECTIVE  Pain Level (0-10 scale): 0/10    Any medication changes, allergies to medications, adverse drug reactions, diagnosis change, or new procedure performed?: No  Medications: [x] Verified on Patient Summary List    Subjective functional status/changes:     \"I can't find a shoe big enough for my brace. But I am from [place]. So I will shop there.\"    OBJECTIVE  Therapeutic Procedures:  Tx Min Billable or 1:1 Min (if diff from Tx Min) Procedure, Rationale, Specifics   53 53 [id number] Therapeutic Exercise (timed):  increase ROM, strength, coordination, balance, and proprioception to improve patient's ability to progress to PLOF and address remaining functional goals. (see flow sheet as applicable)                   53 53    Total Total   [x] Patient Education billed concurrently with other procedures         Pain Level at end of session (0-10 scale): 0/10    Assessment   Patient tolerated treatment will bring in AFO on the next visit. Having a difficulty time fitting shoes. Continue with

## 2025-05-17 ENCOUNTER — APPOINTMENT (OUTPATIENT)
Facility: HOSPITAL | Age: 60
End: 2025-05-17
Attending: FAMILY MEDICINE
Payer: COMMERCIAL

## 2025-05-17 ENCOUNTER — HOSPITAL ENCOUNTER (EMERGENCY)
Facility: HOSPITAL | Age: 60
Discharge: HOME | End: 2025-05-18
Attending: FAMILY MEDICINE
Payer: COMMERCIAL
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
## 2025-05-17 LAB
ALBUMIN SERPL-MCNC: 3.5 G/DL (ref 3.5–5)
ALBUMIN/GLOB SERPL: 0.8 (ref 1.1–2.2)
ALP SERPL-CCNC: 86 U/L (ref 45–117)
ALT SERPL-CCNC: 12 U/L (ref 12–78)
AMMONIA PLAS-SCNC: 18 UMOL/L
ANION GAP SERPL CALC-SCNC: 12 MMOL/L (ref 2–12)
AST SERPL W P-5'-P-CCNC: 11 U/L (ref 15–37)
BASOPHILS # BLD: 0.1 K/UL (ref 0–0.1)
BASOPHILS NFR BLD: 1 % (ref 0–1)
BILIRUB SERPL-MCNC: 0.8 MG/DL (ref 0.2–1)
BNP SERPL-MCNC: 71 PG/ML
BUN SERPL-MCNC: 11 MG/DL (ref 6–20)
BUN/CREAT SERPL: 10 (ref 12–20)
CA-I BLD-MCNC: 9.7 MG/DL (ref 8.5–10.1)
CHLORIDE SERPL-SCNC: 102 MMOL/L (ref 97–108)
CO2 SERPL-SCNC: 23 MMOL/L (ref 21–32)
CREAT SERPL-MCNC: 1.11 MG/DL (ref 0.7–1.3)
DIFFERENTIAL METHOD BLD: NORMAL
EOSINOPHIL # BLD: 0.07 K/UL (ref 0–0.4)
EOSINOPHIL NFR BLD: 0.7 % (ref 0–7)
ERYTHROCYTE [DISTWIDTH] IN BLOOD BY AUTOMATED COUNT: 11.9 % (ref 11.5–14.5)
ETHANOL SERPL-MCNC: <10 MG/DL (ref 0–0.08)
GLOBULIN SER CALC-MCNC: 4.2 G/DL (ref 2–4)
GLUCOSE SERPL-MCNC: 200 MG/DL (ref 65–100)
HCT VFR BLD AUTO: 43.9 % (ref 36.6–50.3)
HGB BLD-MCNC: 14.9 G/DL (ref 12.1–17)
IMM GRANULOCYTES # BLD AUTO: 0.03 K/UL (ref 0–0.04)
IMM GRANULOCYTES NFR BLD AUTO: 0.3 % (ref 0–0.5)
LACTATE SERPL-SCNC: 3.3 MMOL/L (ref 0.4–2)
LYMPHOCYTES # BLD: 2.28 K/UL (ref 0.8–3.5)
LYMPHOCYTES NFR BLD: 23.1 % (ref 12–49)
MAGNESIUM SERPL-MCNC: 1.7 MG/DL (ref 1.6–2.4)
MCH RBC QN AUTO: 30.9 PG (ref 26–34)
MCHC RBC AUTO-ENTMCNC: 33.9 G/DL (ref 30–36.5)
MCV RBC AUTO: 91.1 FL (ref 80–99)
MONOCYTES # BLD: 0.72 K/UL (ref 0–1)
MONOCYTES NFR BLD: 7.3 % (ref 5–13)
NEUTS SEG # BLD: 6.69 K/UL (ref 1.8–8)
NEUTS SEG NFR BLD: 67.6 % (ref 32–75)
NRBC # BLD: 0 K/UL (ref 0–0.01)
NRBC BLD-RTO: 0 PER 100 WBC
PLATELET # BLD AUTO: 186 K/UL (ref 150–400)
PMV BLD AUTO: 11.2 FL (ref 8.9–12.9)
POTASSIUM SERPL-SCNC: 4 MMOL/L (ref 3.5–5.1)
PROT SERPL-MCNC: 7.7 G/DL (ref 6.4–8.2)
RBC # BLD AUTO: 4.82 M/UL (ref 4.1–5.7)
SODIUM SERPL-SCNC: 137 MMOL/L (ref 136–145)
TROPONIN I SERPL HS-MCNC: 9 NG/L (ref 0–76)
WBC # BLD AUTO: 9.9 K/UL (ref 4.1–11.1)

## 2025-05-17 PROCEDURE — 70450 CT HEAD/BRAIN W/O DYE: CPT

## 2025-05-17 PROCEDURE — 82140 ASSAY OF AMMONIA: CPT

## 2025-05-17 PROCEDURE — 83880 ASSAY OF NATRIURETIC PEPTIDE: CPT

## 2025-05-17 PROCEDURE — 36415 COLL VENOUS BLD VENIPUNCTURE: CPT

## 2025-05-17 PROCEDURE — 96360 HYDRATION IV INFUSION INIT: CPT

## 2025-05-17 PROCEDURE — 2580000003 HC RX 258

## 2025-05-17 PROCEDURE — 93005 ELECTROCARDIOGRAM TRACING: CPT | Performed by: FAMILY MEDICINE

## 2025-05-17 PROCEDURE — 85025 COMPLETE CBC W/AUTO DIFF WBC: CPT

## 2025-05-17 PROCEDURE — 84484 ASSAY OF TROPONIN QUANT: CPT

## 2025-05-17 PROCEDURE — 80053 COMPREHEN METABOLIC PANEL: CPT

## 2025-05-17 PROCEDURE — 83735 ASSAY OF MAGNESIUM: CPT

## 2025-05-17 PROCEDURE — 96361 HYDRATE IV INFUSION ADD-ON: CPT

## 2025-05-17 PROCEDURE — 83605 ASSAY OF LACTIC ACID: CPT

## 2025-05-17 PROCEDURE — 99285 EMERGENCY DEPT VISIT HI MDM: CPT

## 2025-05-17 PROCEDURE — 82077 ASSAY SPEC XCP UR&BREATH IA: CPT

## 2025-05-17 RX ORDER — 0.9 % SODIUM CHLORIDE 0.9 %
1000 INTRAVENOUS SOLUTION INTRAVENOUS ONCE
Status: COMPLETED | OUTPATIENT
Start: 2025-05-17 | End: 2025-05-17

## 2025-05-17 RX ADMIN — SODIUM CHLORIDE 1000 ML: 0.9 INJECTION, SOLUTION INTRAVENOUS at 20:37

## 2025-05-17 ASSESSMENT — PAIN - FUNCTIONAL ASSESSMENT
PAIN_FUNCTIONAL_ASSESSMENT: ACTIVITIES ARE NOT PREVENTED
PAIN_FUNCTIONAL_ASSESSMENT: 0-10

## 2025-05-17 ASSESSMENT — LIFESTYLE VARIABLES
HOW OFTEN DO YOU HAVE A DRINK CONTAINING ALCOHOL: NEVER
HOW MANY STANDARD DRINKS CONTAINING ALCOHOL DO YOU HAVE ON A TYPICAL DAY: PATIENT DOES NOT DRINK

## 2025-05-17 ASSESSMENT — PAIN SCALES - GENERAL: PAINLEVEL_OUTOF10: 0

## 2025-05-17 NOTE — ED TRIAGE NOTES
Patient presents via [facility] for complaint of ground level fall walking into [email or web address].  EMS states he is normally ambulatory with a cane due to old stroke and R hemiplegia; however, did not have cane with him today.  Patient frequently drowsy in triage.  Glucose 229 for EMS.  Patient complaining of mild R back pain.  Patient has allegedly had multiple falls recently.

## 2025-05-17 NOTE — ED PROVIDER NOTES
[facility] EMERGENCY DEPT  EMERGENCY DEPARTMENT HISTORY AND PHYSICAL EXAM      Date: 5/17/2025  Patient Name: Randy Gomez  MRN: [id number]  Birthdate 1965  Date of evaluation: 5/17/2025  Provider: Everett Lowe MD   Note Started: 6:42 PM EDT 5/17/25    HISTORY OF PRESENT ILLNESS     Chief Complaint   Patient presents with    Fall       History Provided By: Patient    HPI: Randy Gomez is a 60 y.o. male presents per EMS after apparently falling at the entryway of [employer], he states that he was transported by a friend and that he has difficulty walking across parking lots \"I just ran out\" (of energy) patient denies any head pain neck pain chest pain denies any use of sedatives tonight he denies drinking any alcohol or using any marijuana products he states he has had previous stroke in the past which is left his left side slightly weakened has a history of diabetes and previous episodes of vertigo    PAST MEDICAL HISTORY   Past Medical History:  Past Medical History:   Diagnosis Date    Abscess     Diabetes (HCC)     Hypercholesterolemia     Sleep apnea     Stroke (HCC)     Vertigo        Past Surgical History:  History reviewed. No pertinent surgical history.    Family History:  Family History   Problem Relation Age of Onset    No Known Problems Sister     No Known Problems Brother     No Known Problems Brother     Stroke Brother     Diabetes Brother     No Known Problems Sister     Alcohol Abuse Father     Diabetes Mother        Social History:  Social History     Tobacco Use    Smoking status: Every Day     Current packs/day: 1.00     Average packs/day: 1 pack/day for 41.2 years (41.2 ttl pk-yrs)     Types: Cigarettes     Start date: 3/24/1984     Passive exposure: Never    Smokeless tobacco: Never   Vaping Use    Vaping status: Never Used   Substance Use Topics    Alcohol use: Not Currently    Drug use: Never       Allergies:  No Known Allergies    PCP: Everett Alvarez MD    Current Meds:  medications:  Medications   sodium chloride 0.9 % bolus 1,000 mL (0 mLs IntraVENous Stopped 5/17/25 9413)       CONSULTS: See ED Course/MDM for further details.  None     Social Determinants affecting Diagnosis/Treatment:     Smoking Cessation:     PROCEDURES   Unless otherwise noted above, none  Procedures      CRITICAL CARE TIME       ED IMPRESSION     1. Fall, initial encounter          DISPOSITION/PLAN   DISPOSITION Decision To Discharge 05/18/2025 03:10:10 AM             Discharge Note: The patient is stable for discharge home. The signs, symptoms, diagnosis, and discharge instructions have been discussed, understanding conveyed, and agreed upon. The patient is to follow up as recommended or return to ER should their symptoms worsen.      PATIENT REFERRED TO:  [facility]  [address] [address]  [phone]            DISCHARGE MEDICATIONS:     Medication List        CONTINUE taking these medications      Eye Patch Misc            ASK your doctor about these medications      aspirin 81 MG EC tablet     atorvastatin 40 MG tablet  Commonly known as: LIPITOR     folic acid 1 MG tablet  Commonly known as: FOLVITE     glipiZIDE 10 MG tablet  Commonly known as: GLUCOTROL     HumuLIN 70/30 KwikPen (70-30) 100 UNIT/ML injection pen  Generic drug: Insulin NPH Isophane & Regular     metFORMIN 1000 MG tablet  Commonly known as: GLUCOPHAGE     Wh Petrol-Mineral Oil-Lanolin 0.1-0.1 % Oint                DISCONTINUED MEDICATIONS:  Discharge Medication List as of 5/18/2025  2:38 AM          I am the Primary Clinician of Record. Everett Lowe MD (electronically signed)    (Please note that parts of this dictation were completed with voice recognition software. Quite often unanticipated grammatical, syntax, homophones, and other interpretive errors are inadvertently transcribed by the computer software. Please disregards these errors. Please excuse any errors that have

## 2025-05-18 ENCOUNTER — APPOINTMENT (OUTPATIENT)
Facility: HOSPITAL | Age: 60
End: 2025-05-18
Payer: COMMERCIAL

## 2025-05-18 VITALS
HEIGHT: 70 IN | WEIGHT: 219 LBS | TEMPERATURE: 97.5 F | OXYGEN SATURATION: 94 % | SYSTOLIC BLOOD PRESSURE: 124 MMHG | DIASTOLIC BLOOD PRESSURE: 85 MMHG | HEART RATE: 97 BPM | RESPIRATION RATE: 19 BRPM | BODY MASS INDEX: 31.35 KG/M2

## 2025-05-18 LAB — LACTATE SERPL-SCNC: 1.5 MMOL/L (ref 0.4–2)

## 2025-05-18 PROCEDURE — 71045 X-RAY EXAM CHEST 1 VIEW: CPT

## 2025-05-18 NOTE — ED NOTES
Patient infused with approximately 700 mL normal saline before IV infiltrated.  MD aware.  Phlebot coming to straight stick patient for remaining labs.

## 2025-05-18 NOTE — ED NOTES
Patient gave phone number of family friend to pick him up for discharge.  This writer called the number given with no answer.  Norberto [phone].

## 2025-05-18 NOTE — ED NOTES
Patient stable at time of discharge. Reviewed discharge instructions and education. Patient verbalized understanding. Patient states no questions at this time.  Patient picked up by family member called by this writer.

## 2025-05-19 LAB
EKG ATRIAL RATE: 109 BPM
EKG DIAGNOSIS: NORMAL
EKG P AXIS: 52 DEGREES
EKG P-R INTERVAL: 184 MS
EKG Q-T INTERVAL: 314 MS
EKG QRS DURATION: 88 MS
EKG QTC CALCULATION (BAZETT): 421 MS
EKG R AXIS: -14 DEGREES
EKG T AXIS: 142 DEGREES
EKG VENTRICULAR RATE: 108 BPM